# Patient Record
Sex: FEMALE | Race: WHITE | NOT HISPANIC OR LATINO | Employment: OTHER | URBAN - METROPOLITAN AREA
[De-identification: names, ages, dates, MRNs, and addresses within clinical notes are randomized per-mention and may not be internally consistent; named-entity substitution may affect disease eponyms.]

---

## 2021-06-01 ENCOUNTER — TRANSCRIBE ORDERS (OUTPATIENT)
Dept: ADMINISTRATIVE | Facility: HOSPITAL | Age: 71
End: 2021-06-01

## 2021-06-01 DIAGNOSIS — Z12.31 ENCOUNTER FOR SCREENING MAMMOGRAM FOR BREAST CANCER: Primary | ICD-10-CM

## 2021-07-15 ENCOUNTER — APPOINTMENT (OUTPATIENT)
Dept: MAMMOGRAPHY | Facility: HOSPITAL | Age: 71
End: 2021-07-15

## 2021-10-08 ENCOUNTER — OFFICE VISIT (OUTPATIENT)
Dept: ORTHOPEDIC SURGERY | Facility: CLINIC | Age: 71
End: 2021-10-08

## 2021-10-08 VITALS — BODY MASS INDEX: 45.67 KG/M2 | WEIGHT: 248.2 LBS | HEIGHT: 62 IN | HEART RATE: 78 BPM | OXYGEN SATURATION: 87 %

## 2021-10-08 DIAGNOSIS — M25.562 PAIN IN BOTH KNEES, UNSPECIFIED CHRONICITY: ICD-10-CM

## 2021-10-08 DIAGNOSIS — M17.0 BILATERAL PRIMARY OSTEOARTHRITIS OF KNEE: Primary | ICD-10-CM

## 2021-10-08 DIAGNOSIS — M25.561 PAIN IN BOTH KNEES, UNSPECIFIED CHRONICITY: ICD-10-CM

## 2021-10-08 PROCEDURE — 99203 OFFICE O/P NEW LOW 30 MIN: CPT | Performed by: ORTHOPAEDIC SURGERY

## 2021-10-08 PROCEDURE — 20610 DRAIN/INJ JOINT/BURSA W/O US: CPT | Performed by: ORTHOPAEDIC SURGERY

## 2021-10-08 RX ORDER — FUROSEMIDE 40 MG/1
40 TABLET ORAL 2 TIMES DAILY
Status: ON HOLD | COMMUNITY
End: 2022-01-18 | Stop reason: SDUPTHER

## 2021-10-08 RX ORDER — ALBUTEROL SULFATE 90 UG/1
2 AEROSOL, METERED RESPIRATORY (INHALATION) EVERY 4 HOURS PRN
COMMUNITY

## 2021-10-08 RX ORDER — ESCITALOPRAM OXALATE 20 MG/1
20 TABLET ORAL DAILY
COMMUNITY

## 2021-10-08 RX ORDER — OMEPRAZOLE 20 MG/1
20 CAPSULE, DELAYED RELEASE ORAL DAILY
COMMUNITY

## 2021-10-08 RX ORDER — GABAPENTIN 800 MG/1
800 TABLET ORAL 3 TIMES DAILY
COMMUNITY

## 2021-10-08 RX ORDER — ATENOLOL 25 MG/1
25 TABLET ORAL DAILY
COMMUNITY

## 2021-10-08 RX ORDER — ATORVASTATIN CALCIUM 40 MG/1
40 TABLET, FILM COATED ORAL DAILY
COMMUNITY

## 2021-10-08 RX ORDER — HYDROCODONE BITARTRATE AND ACETAMINOPHEN 10; 325 MG/1; MG/1
1 TABLET ORAL EVERY 6 HOURS PRN
COMMUNITY

## 2021-10-08 RX ADMIN — LIDOCAINE HYDROCHLORIDE 9 ML: 10 INJECTION, SOLUTION INFILTRATION; PERINEURAL at 14:57

## 2021-10-08 RX ADMIN — METHYLPREDNISOLONE ACETATE 80 MG: 80 INJECTION, SUSPENSION INTRA-ARTICULAR; INTRALESIONAL; INTRAMUSCULAR; SOFT TISSUE at 14:57
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
## 2021-10-11 RX ORDER — LIDOCAINE HYDROCHLORIDE 10 MG/ML
9 INJECTION, SOLUTION INFILTRATION; PERINEURAL
Status: COMPLETED | OUTPATIENT
Start: 2021-10-08 | End: 2021-10-08

## 2021-10-11 RX ORDER — METHYLPREDNISOLONE ACETATE 80 MG/ML
80 INJECTION, SUSPENSION INTRA-ARTICULAR; INTRALESIONAL; INTRAMUSCULAR; SOFT TISSUE
Status: COMPLETED | OUTPATIENT
Start: 2021-10-08 | End: 2021-10-08

## 2022-01-10 ENCOUNTER — APPOINTMENT (OUTPATIENT)
Dept: GENERAL RADIOLOGY | Facility: HOSPITAL | Age: 72
End: 2022-01-10

## 2022-01-10 ENCOUNTER — HOSPITAL ENCOUNTER (INPATIENT)
Facility: HOSPITAL | Age: 72
LOS: 8 days | Discharge: NURSING, REHAB OR LONG-TERM CARE | End: 2022-01-18
Attending: EMERGENCY MEDICINE | Admitting: FAMILY MEDICINE

## 2022-01-10 DIAGNOSIS — R26.2 DIFFICULTY WALKING: ICD-10-CM

## 2022-01-10 DIAGNOSIS — J18.9 PNEUMONIA OF LEFT LOWER LOBE DUE TO INFECTIOUS ORGANISM: ICD-10-CM

## 2022-01-10 DIAGNOSIS — J96.01 ACUTE RESPIRATORY FAILURE WITH HYPOXIA: Primary | ICD-10-CM

## 2022-01-10 DIAGNOSIS — Z78.9 DECREASED ACTIVITIES OF DAILY LIVING (ADL): ICD-10-CM

## 2022-01-10 DIAGNOSIS — J44.1 ACUTE EXACERBATION OF CHRONIC OBSTRUCTIVE PULMONARY DISEASE (COPD): ICD-10-CM

## 2022-01-10 LAB
ALBUMIN SERPL-MCNC: 3.9 G/DL (ref 3.5–5.2)
ALBUMIN/GLOB SERPL: 1.1 G/DL
ALP SERPL-CCNC: 129 U/L (ref 39–117)
ALT SERPL W P-5'-P-CCNC: 11 U/L (ref 1–33)
ANION GAP SERPL CALCULATED.3IONS-SCNC: 13.1 MMOL/L (ref 5–15)
ARTERIAL PATENCY WRIST A: POSITIVE
AST SERPL-CCNC: 16 U/L (ref 1–32)
BASE EXCESS BLDA CALC-SCNC: -0.1 MMOL/L (ref -2–2)
BASOPHILS # BLD AUTO: 0.03 10*3/MM3 (ref 0–0.2)
BASOPHILS NFR BLD AUTO: 0.3 % (ref 0–1.5)
BDY SITE: ABNORMAL
BILIRUB SERPL-MCNC: 0.4 MG/DL (ref 0–1.2)
BUN SERPL-MCNC: 24 MG/DL (ref 8–23)
BUN/CREAT SERPL: 28.9 (ref 7–25)
CALCIUM SPEC-SCNC: 8.5 MG/DL (ref 8.6–10.5)
CHLORIDE SERPL-SCNC: 103 MMOL/L (ref 98–107)
CO2 SERPL-SCNC: 20.9 MMOL/L (ref 22–29)
COHGB MFR BLD: 1.2 % (ref 0–1.5)
CREAT SERPL-MCNC: 0.83 MG/DL (ref 0.57–1)
D-LACTATE SERPL-SCNC: 1.5 MMOL/L (ref 0.5–2)
DEPRECATED RDW RBC AUTO: 46 FL (ref 37–54)
EOSINOPHIL # BLD AUTO: 0.05 10*3/MM3 (ref 0–0.4)
EOSINOPHIL NFR BLD AUTO: 0.4 % (ref 0.3–6.2)
ERYTHROCYTE [DISTWIDTH] IN BLOOD BY AUTOMATED COUNT: 14.3 % (ref 12.3–15.4)
FHHB: 8 % (ref 0–5)
FLUAV AG NPH QL: NEGATIVE
FLUBV AG NPH QL IA: NEGATIVE
GAS FLOW AIRWAY: 0 LPM
GFR SERPL CREATININE-BSD FRML MDRD: 68 ML/MIN/1.73
GLOBULIN UR ELPH-MCNC: 3.6 GM/DL
GLUCOSE SERPL-MCNC: 145 MG/DL (ref 65–99)
HCO3 BLDA-SCNC: 24.6 MMOL/L (ref 22–26)
HCT VFR BLD AUTO: 42.9 % (ref 34–46.6)
HGB BLD-MCNC: 13.7 G/DL (ref 12–15.9)
HGB BLDA-MCNC: 14.1 G/DL (ref 11.7–14.6)
HOLD SPECIMEN: NORMAL
HOLD SPECIMEN: NORMAL
IMM GRANULOCYTES # BLD AUTO: 0.04 10*3/MM3 (ref 0–0.05)
IMM GRANULOCYTES NFR BLD AUTO: 0.4 % (ref 0–0.5)
INHALED O2 CONCENTRATION: 21 %
LACTATE BLDA-SCNC: ABNORMAL MMOL/L
LYMPHOCYTES # BLD AUTO: 2.72 10*3/MM3 (ref 0.7–3.1)
LYMPHOCYTES NFR BLD AUTO: 23.9 % (ref 19.6–45.3)
MCH RBC QN AUTO: 28.3 PG (ref 26.6–33)
MCHC RBC AUTO-ENTMCNC: 31.9 G/DL (ref 31.5–35.7)
MCV RBC AUTO: 88.6 FL (ref 79–97)
METHGB BLD QL: 0.1 % (ref 0–1.5)
MODALITY: ABNORMAL
MONOCYTES # BLD AUTO: 0.72 10*3/MM3 (ref 0.1–0.9)
MONOCYTES NFR BLD AUTO: 6.3 % (ref 5–12)
NEUTROPHILS NFR BLD AUTO: 68.7 % (ref 42.7–76)
NEUTROPHILS NFR BLD AUTO: 7.8 10*3/MM3 (ref 1.7–7)
NRBC BLD AUTO-RTO: 0 /100 WBC (ref 0–0.2)
NT-PROBNP SERPL-MCNC: 145.1 PG/ML (ref 0–900)
OXYHGB MFR BLDV: 90.7 % (ref 94–99)
PCO2 BLDA: 40.4 MM HG (ref 35–45)
PH BLDA: 7.4 PH UNITS (ref 7.35–7.45)
PLATELET # BLD AUTO: 331 10*3/MM3 (ref 140–450)
PMV BLD AUTO: 9.1 FL (ref 6–12)
PO2 BLD: 300 MM[HG] (ref 0–500)
PO2 BLDA: 63 MM HG (ref 80–100)
POTASSIUM SERPL-SCNC: 3.9 MMOL/L (ref 3.5–5.2)
PROCALCITONIN SERPL-MCNC: 0.04 NG/ML (ref 0–0.25)
PROT SERPL-MCNC: 7.5 G/DL (ref 6–8.5)
QT INTERVAL: 362 MS
RBC # BLD AUTO: 4.84 10*6/MM3 (ref 3.77–5.28)
SAO2 % BLDCOA: 91.9 % (ref 95–99)
SARS-COV-2 RNA PNL SPEC NAA+PROBE: NOT DETECTED
SODIUM SERPL-SCNC: 137 MMOL/L (ref 136–145)
TROPONIN T SERPL-MCNC: <0.01 NG/ML (ref 0–0.03)
WBC NRBC COR # BLD: 11.36 10*3/MM3 (ref 3.4–10.8)
WHOLE BLOOD HOLD SPECIMEN: NORMAL
WHOLE BLOOD HOLD SPECIMEN: NORMAL

## 2022-01-10 PROCEDURE — 94799 UNLISTED PULMONARY SVC/PX: CPT

## 2022-01-10 PROCEDURE — 83605 ASSAY OF LACTIC ACID: CPT | Performed by: EMERGENCY MEDICINE

## 2022-01-10 PROCEDURE — 82805 BLOOD GASES W/O2 SATURATION: CPT | Performed by: EMERGENCY MEDICINE

## 2022-01-10 PROCEDURE — 71045 X-RAY EXAM CHEST 1 VIEW: CPT

## 2022-01-10 PROCEDURE — 93010 ELECTROCARDIOGRAM REPORT: CPT | Performed by: INTERNAL MEDICINE

## 2022-01-10 PROCEDURE — 36600 WITHDRAWAL OF ARTERIAL BLOOD: CPT | Performed by: EMERGENCY MEDICINE

## 2022-01-10 PROCEDURE — 84484 ASSAY OF TROPONIN QUANT: CPT | Performed by: EMERGENCY MEDICINE

## 2022-01-10 PROCEDURE — 25010000002 CEFTRIAXONE PER 250 MG: Performed by: EMERGENCY MEDICINE

## 2022-01-10 PROCEDURE — 25010000002 ONDANSETRON PER 1 MG: Performed by: FAMILY MEDICINE

## 2022-01-10 PROCEDURE — 83050 HGB METHEMOGLOBIN QUAN: CPT | Performed by: EMERGENCY MEDICINE

## 2022-01-10 PROCEDURE — 25010000002 AZITHROMYCIN PER 500 MG: Performed by: EMERGENCY MEDICINE

## 2022-01-10 PROCEDURE — 94640 AIRWAY INHALATION TREATMENT: CPT

## 2022-01-10 PROCEDURE — 82375 ASSAY CARBOXYHB QUANT: CPT | Performed by: EMERGENCY MEDICINE

## 2022-01-10 PROCEDURE — 87804 INFLUENZA ASSAY W/OPTIC: CPT | Performed by: EMERGENCY MEDICINE

## 2022-01-10 PROCEDURE — 99285 EMERGENCY DEPT VISIT HI MDM: CPT

## 2022-01-10 PROCEDURE — 87070 CULTURE OTHR SPECIMN AEROBIC: CPT | Performed by: FAMILY MEDICINE

## 2022-01-10 PROCEDURE — 83880 ASSAY OF NATRIURETIC PEPTIDE: CPT | Performed by: EMERGENCY MEDICINE

## 2022-01-10 PROCEDURE — 87205 SMEAR GRAM STAIN: CPT | Performed by: FAMILY MEDICINE

## 2022-01-10 PROCEDURE — 80053 COMPREHEN METABOLIC PANEL: CPT | Performed by: EMERGENCY MEDICINE

## 2022-01-10 PROCEDURE — 25010000002 ENOXAPARIN PER 10 MG: Performed by: FAMILY MEDICINE

## 2022-01-10 PROCEDURE — U0004 COV-19 TEST NON-CDC HGH THRU: HCPCS | Performed by: EMERGENCY MEDICINE

## 2022-01-10 PROCEDURE — 25010000002 METHYLPREDNISOLONE PER 40 MG: Performed by: FAMILY MEDICINE

## 2022-01-10 PROCEDURE — 93005 ELECTROCARDIOGRAM TRACING: CPT | Performed by: EMERGENCY MEDICINE

## 2022-01-10 PROCEDURE — 99223 1ST HOSP IP/OBS HIGH 75: CPT | Performed by: FAMILY MEDICINE

## 2022-01-10 PROCEDURE — 85025 COMPLETE CBC W/AUTO DIFF WBC: CPT | Performed by: EMERGENCY MEDICINE

## 2022-01-10 PROCEDURE — 87040 BLOOD CULTURE FOR BACTERIA: CPT | Performed by: EMERGENCY MEDICINE

## 2022-01-10 PROCEDURE — 84145 PROCALCITONIN (PCT): CPT | Performed by: EMERGENCY MEDICINE

## 2022-01-10 RX ORDER — CHOLECALCIFEROL (VITAMIN D3) 125 MCG
5 CAPSULE ORAL NIGHTLY PRN
Status: DISCONTINUED | OUTPATIENT
Start: 2022-01-10 | End: 2022-01-18 | Stop reason: HOSPADM

## 2022-01-10 RX ORDER — ACETAMINOPHEN 325 MG/1
650 TABLET ORAL ONCE
Status: DISCONTINUED | OUTPATIENT
Start: 2022-01-10 | End: 2022-01-10

## 2022-01-10 RX ORDER — POLYETHYLENE GLYCOL 3350 17 G/17G
17 POWDER, FOR SOLUTION ORAL DAILY PRN
Status: DISCONTINUED | OUTPATIENT
Start: 2022-01-10 | End: 2022-01-18 | Stop reason: HOSPADM

## 2022-01-10 RX ORDER — CEFTRIAXONE SODIUM 1 G/50ML
1 INJECTION, SOLUTION INTRAVENOUS ONCE
Status: DISCONTINUED | OUTPATIENT
Start: 2022-01-10 | End: 2022-01-10

## 2022-01-10 RX ORDER — SODIUM CHLORIDE 0.9 % (FLUSH) 0.9 %
10 SYRINGE (ML) INJECTION AS NEEDED
Status: DISCONTINUED | OUTPATIENT
Start: 2022-01-10 | End: 2022-01-18 | Stop reason: HOSPADM

## 2022-01-10 RX ORDER — SODIUM CHLORIDE 0.9 % (FLUSH) 0.9 %
10 SYRINGE (ML) INJECTION AS NEEDED
Status: DISCONTINUED | OUTPATIENT
Start: 2022-01-10 | End: 2022-01-11

## 2022-01-10 RX ORDER — BUDESONIDE 0.5 MG/2ML
0.5 INHALANT ORAL
Status: DISCONTINUED | OUTPATIENT
Start: 2022-01-10 | End: 2022-01-18 | Stop reason: HOSPADM

## 2022-01-10 RX ORDER — ALUMINA, MAGNESIA, AND SIMETHICONE 2400; 2400; 240 MG/30ML; MG/30ML; MG/30ML
15 SUSPENSION ORAL EVERY 6 HOURS PRN
Status: DISCONTINUED | OUTPATIENT
Start: 2022-01-10 | End: 2022-01-18 | Stop reason: HOSPADM

## 2022-01-10 RX ORDER — AMOXICILLIN 250 MG
2 CAPSULE ORAL 2 TIMES DAILY
Status: DISCONTINUED | OUTPATIENT
Start: 2022-01-10 | End: 2022-01-18 | Stop reason: HOSPADM

## 2022-01-10 RX ORDER — METHYLPREDNISOLONE SODIUM SUCCINATE 40 MG/ML
40 INJECTION, POWDER, LYOPHILIZED, FOR SOLUTION INTRAMUSCULAR; INTRAVENOUS EVERY 8 HOURS
Status: DISCONTINUED | OUTPATIENT
Start: 2022-01-10 | End: 2022-01-12

## 2022-01-10 RX ORDER — ACETAMINOPHEN 325 MG/1
650 TABLET ORAL ONCE
Status: COMPLETED | OUTPATIENT
Start: 2022-01-10 | End: 2022-01-10

## 2022-01-10 RX ORDER — IPRATROPIUM BROMIDE AND ALBUTEROL SULFATE 2.5; .5 MG/3ML; MG/3ML
3 SOLUTION RESPIRATORY (INHALATION) ONCE
Status: COMPLETED | OUTPATIENT
Start: 2022-01-10 | End: 2022-01-10

## 2022-01-10 RX ORDER — GUAIFENESIN AND CODEINE PHOSPHATE 100; 10 MG/5ML; MG/5ML
5 SOLUTION ORAL EVERY 4 HOURS PRN
Status: DISCONTINUED | OUTPATIENT
Start: 2022-01-10 | End: 2022-01-11

## 2022-01-10 RX ORDER — BISACODYL 10 MG
10 SUPPOSITORY, RECTAL RECTAL DAILY PRN
Status: DISCONTINUED | OUTPATIENT
Start: 2022-01-10 | End: 2022-01-18 | Stop reason: HOSPADM

## 2022-01-10 RX ORDER — SODIUM CHLORIDE 0.9 % (FLUSH) 0.9 %
10 SYRINGE (ML) INJECTION EVERY 12 HOURS SCHEDULED
Status: DISCONTINUED | OUTPATIENT
Start: 2022-01-10 | End: 2022-01-18 | Stop reason: HOSPADM

## 2022-01-10 RX ORDER — ONDANSETRON 2 MG/ML
4 INJECTION INTRAMUSCULAR; INTRAVENOUS EVERY 6 HOURS PRN
Status: DISCONTINUED | OUTPATIENT
Start: 2022-01-10 | End: 2022-01-18 | Stop reason: HOSPADM

## 2022-01-10 RX ORDER — CEFTRIAXONE SODIUM 1 G/50ML
1 INJECTION, SOLUTION INTRAVENOUS EVERY 24 HOURS
Status: COMPLETED | OUTPATIENT
Start: 2022-01-11 | End: 2022-01-16

## 2022-01-10 RX ORDER — OXYCODONE HYDROCHLORIDE 5 MG/1
5 TABLET ORAL EVERY 6 HOURS PRN
Status: DISCONTINUED | OUTPATIENT
Start: 2022-01-10 | End: 2022-01-11

## 2022-01-10 RX ORDER — BISACODYL 5 MG/1
5 TABLET, DELAYED RELEASE ORAL DAILY PRN
Status: DISCONTINUED | OUTPATIENT
Start: 2022-01-10 | End: 2022-01-18 | Stop reason: HOSPADM

## 2022-01-10 RX ORDER — ARFORMOTEROL TARTRATE 15 UG/2ML
15 SOLUTION RESPIRATORY (INHALATION)
Status: DISCONTINUED | OUTPATIENT
Start: 2022-01-10 | End: 2022-01-18 | Stop reason: HOSPADM

## 2022-01-10 RX ORDER — CEFTRIAXONE SODIUM 1 G/50ML
1 INJECTION, SOLUTION INTRAVENOUS ONCE
Status: COMPLETED | OUTPATIENT
Start: 2022-01-10 | End: 2022-01-10

## 2022-01-10 RX ORDER — IPRATROPIUM BROMIDE AND ALBUTEROL SULFATE 2.5; .5 MG/3ML; MG/3ML
3 SOLUTION RESPIRATORY (INHALATION)
Status: DISCONTINUED | OUTPATIENT
Start: 2022-01-10 | End: 2022-01-17

## 2022-01-10 RX ORDER — ACETAMINOPHEN 325 MG/1
650 TABLET ORAL EVERY 4 HOURS PRN
Status: DISCONTINUED | OUTPATIENT
Start: 2022-01-10 | End: 2022-01-18 | Stop reason: HOSPADM

## 2022-01-10 RX ADMIN — IPRATROPIUM BROMIDE AND ALBUTEROL SULFATE 3 ML: .5; 3 SOLUTION RESPIRATORY (INHALATION) at 12:02

## 2022-01-10 RX ADMIN — IPRATROPIUM BROMIDE AND ALBUTEROL SULFATE 3 ML: .5; 3 SOLUTION RESPIRATORY (INHALATION) at 12:01

## 2022-01-10 RX ADMIN — ACETAMINOPHEN 650 MG: 325 TABLET ORAL at 20:49

## 2022-01-10 RX ADMIN — SODIUM CHLORIDE, PRESERVATIVE FREE 10 ML: 5 INJECTION INTRAVENOUS at 20:49

## 2022-01-10 RX ADMIN — ARFORMOTEROL TARTRATE 15 MCG: 15 SOLUTION RESPIRATORY (INHALATION) at 21:24

## 2022-01-10 RX ADMIN — METHYLPREDNISOLONE SODIUM SUCCINATE 40 MG: 40 INJECTION, POWDER, FOR SOLUTION INTRAMUSCULAR; INTRAVENOUS at 21:33

## 2022-01-10 RX ADMIN — ONDANSETRON 4 MG: 2 INJECTION INTRAMUSCULAR; INTRAVENOUS at 21:33

## 2022-01-10 RX ADMIN — CEFTRIAXONE SODIUM 1 G: 1 INJECTION, SOLUTION INTRAVENOUS at 12:30

## 2022-01-10 RX ADMIN — Medication 5 MG: at 20:49

## 2022-01-10 RX ADMIN — IPRATROPIUM BROMIDE AND ALBUTEROL SULFATE 3 ML: .5; 2.5 SOLUTION RESPIRATORY (INHALATION) at 21:24

## 2022-01-10 RX ADMIN — ENOXAPARIN SODIUM 40 MG: 40 INJECTION SUBCUTANEOUS at 20:49

## 2022-01-10 RX ADMIN — BUDESONIDE 0.5 MG: 0.5 SUSPENSION RESPIRATORY (INHALATION) at 21:24

## 2022-01-10 RX ADMIN — ACETAMINOPHEN 650 MG: 325 TABLET ORAL at 14:44

## 2022-01-10 NOTE — H&P
[facility]   HOSPITALIST HISTORY AND PHYSICAL  Date: 1/10/2022   Patient Name: Ashtyn Jean Baptiste  : 1950  MRN: [id number]  Primary Care Physician:  Virgilio Morales MD  Date of admission: 1/10/2022    Subjective   Subjective     Chief complaint: Shortness of breath    History of presenting illness:  71-year-old female with history of COPD, GERD without esophagitis, CHF not otherwise specified presented to the emergency room on 1/10/2022 via EMS with chief complaint of shortness of breath.  She notes acute onset shortness of breath symptoms since the morning of 1/10/2022, she woke up with difficulty taking in deep inspiration, over the course of the morning, shortness of breath symptoms have worsened, exertional dyspnea progressed into dyspnea at rest and conversational dyspnea.  She denied any recent fevers, chills, sweats, headache, palpitations, nausea, vomiting, cough, abdominal pain.  She notes that she feels weakness in her lower extremities and has chest pain all over.  She denies any sick contacts or recent travel.  In the emergency room she was found to be acutely hypoxemic requiring 4 L, improved to 2 L while in the ER; nasal cannula oxygen to maintain sats greater than 90%, she does not wear home oxygen.  She was found to be tachypneic and in respiratory distress.  Chest x-ray was concerning for pneumonia left lower lung zone.  White blood cell count elevated 11,000, she was pancultured and placed on antibiotics, she was given several nebs in the emergency room, given the findings of pneumonia and acute hypoxemia with signs of respiratory failure with increased work of breathing, tachypnea, hospitalist service requested to admit for further management. ABG PO2 was 63 on Room air. She does note after further probing for the past 4 days she has not been feeling well, head congestion, body aches, generalized fatigue.    Personal History     Past Medical History:  Past Medical History:   Diagnosis  Date   • CHF (congestive heart failure) (HCC)    • COPD (chronic obstructive pulmonary disease) (HCC)    • GERD (gastroesophageal reflux disease)          Past Surgical History:  Past Surgical History:   Procedure Laterality Date   • APPENDECTOMY     • HYSTERECTOMY           Family History:   Breast Cancer-related family history is not on file.  Denied family history of heart disease, diabetes, or cancers  No sick contacts at home    Social History:   Social History     Socioeconomic History   • Marital status:    Tobacco Use   • Smoking status: Former Smoker   • Smokeless tobacco: Never Used   Substance and Sexual Activity   • Alcohol use: Never   • Drug use: Never   • Sexual activity: Defer     Home Medications:  HYDROcodone-acetaminophen, albuterol sulfate HFA, atenolol, atorvastatin, escitalopram, furosemide, gabapentin, and omeprazole    Allergies:  Allergies   Allergen Reactions   • Latex Hives   • Codeine Nausea And Vomiting   • Morphine Nausea And Vomiting       Review of Systems   All systems were reviewed and negative except for: Nasal congestion, body aches, fatigue, increased work of breathing, shortness of breath.    Objective   Objective     Vitals:   Temp:  [97.9 °F (36.6 °C)] 97.9 °F (36.6 °C)  Heart Rate:  [83-89] 83  Resp:  [15-20] 20  BP: (159)/(72) 159/72  Flow (L/min):  [2] 2    Physical Exam    Constitutional: Awake, alert, no acute distress morbidly obese in ER room 14 hospital stretcher laying semiflat, on 2 L nasal cannula, conversational dyspnea   Eyes: Pupils equal, sclerae anicteric, no conjunctival injection   HENT: NCAT, mucous membranes moist, sinus congestion   Neck: Supple, no thyromegaly, no lymphadenopathy, trachea midline   Respiratory: Air entry bilaterally, coarse breath sounds throughout, coarse wheezing inspiratory and expiratory   Cardiovascular: RRR, no murmurs, rubs, or gallops, palpable pedal pulses bilaterally   Gastrointestinal: Positive bowel sounds, soft,  nontender, nondistended   Musculoskeletal: No bilateral ankle edema, no clubbing or cyanosis to extremities   Psychiatric: Appropriate affect, cooperative   Neurologic: Oriented x 3, strength symmetric in all extremities, Cranial Nerves grossly intact to confrontation, speech clear   Skin: No rashes     Result Review    Result Review:  I have personally reviewed the results from the time of this admission to 1/10/2022 15:17 EST and agree with these findings:  [x]  Laboratory   CBC    CBC 5/26/21 1/10/22   WBC 9.26 11.36 (A)   RBC 4.36 4.84   Hemoglobin 11.2 (A) 13.7   Hematocrit 37.7 42.9   MCV 86.5 88.6   MCH 25.7 (A) 28.3   MCHC 29.7 (A) 31.9   RDW 15.5 14.3   Platelets 327 331   (A) Abnormal value            BMP    BMP 11/23/21 12/3/21 1/10/22   Glucose 110 (A) 108 (A)    BUN 32 (A) 24 (A) 24 (A)   Creatinine 0.9 1.1 0.83   Sodium 142 139 137   Potassium 5.2 (A) 4.6 3.9   Chloride 104 99 103   CO2 27 27 20.9 (A)   Calcium 9.6 9.3 8.5 (A)   (A) Abnormal value       Comments are available for some flowsheets but are not being displayed.           LIVER FUNCTION TESTS:      Lab 01/10/22  1059   TOTAL PROTEIN 7.5   ALBUMIN 3.90   GLOBULIN 3.6   ALT (SGPT) 11   AST (SGOT) 16   BILIRUBIN 0.4   ALK PHOS 129*       [x]  Microbiology  [x]  Radiology XR Chest 1 View    Result Date: 1/10/2022  PROCEDURE: XR CHEST 1 VW  COMPARISON: None  INDICATIONS: SOB, CHEST PAINS  FINDINGS:  Heart size is upper limits of normal.  Interstitial prominence in the mid to lower lung zones.  There is an area of more dense opacity in the lateral left lower lung zone.       Findings suspicious for pneumonia, particularly in the left lower lung zone.  Correlate with presentation.       MICHAEL LERNER MD       Electronically Signed and Approved By: MICHAEL LERNER MD on 1/10/2022 at 11:20             [x]  EKG/Telemetry   []  Cardiology/Vascular   []  Pathology  [x]  Old records  []  Other:      Assessment/Plan   Assessment / Plan     Assessment/Plan:    Assessment:  Left lower lobe pneumonia  Acute hypoxemic respiratory failure secondary to pneumonia  Concern for SARS-CoV-2 infection  COPD with acute exacerbation  Increased work of breathing  Morbid obesity  Leukocytosis  History of CHF unspecified diastolic or systolic  GERD without esophagitis    Plan:  Labs and imaging reviewed  Admit to hospitalist service  Isolate for possibility of SARS-CoV-2 infection  Check procalcitonin  Start ceftriaxone 1 g daily for 7 days  Start azithromycin 500 milligrams daily for 3 days  Follow-up strep antigen, Legionella antigen, mycoplasma IgM, sputum culture  If she does test positive for SARS-CoV-2 infection, will start Decadron and remdesivir  Telemetry monitoring  Start Brovana, Pulmicort nebs twice daily  Start DuoNebs every 6 hours  Bronchopulmonary hygiene protocol  Bronchodilator protocol  Supplemental oxygen to maintain O2 saturation greater 92%  Start Solu-Medrol 40 mg IV every 8 hours  A.m. labs  Full code  VT prophylaxis Lovenox  Clinical course to dictate further management  Discussed with nurse at the bedside, ER Dr. Grider who requested admission      DVT prophylaxis:  No DVT prophylaxis order currently exists.    CODE STATUS:    Level Of Support Discussed With: Patient  Code Status (Patient has no pulse and is not breathing): CPR (Attempt to Resuscitate)  Medical Interventions (Patient has pulse or is breathing): Full Support      Admission Status:  I believe this patient meets inpatient status.    Electronically signed by Tripp Arzate MD, 01/10/22, 3:17 PM EST.

## 2022-01-10 NOTE — ED NOTES
Patient sating 87% on room air post duoneb treatment. Patient was placed on 2L NC sats at 89-90%. Patient was placed on 3L NC and is sitting at 92%     Mariano Dean RN  01/10/22 9504

## 2022-01-10 NOTE — ED PROVIDER NOTES
Time: 14:50 EST  Arrived by: POV  Chief Complaint: SOB  History provided by: pt  History is limited by: N/A    History of Present Illness:    Ashtyn Jean Baptiste is a 71 y.o. female with a hx of COPD who presents to the emergency department today with complaints of shortness of breath since this morning. Pt claims she woke up having difficulty getting air in and notes it has been persistent since onset. She denies being on home O2. Pt goes on to complain of pleuritic chest pain as well as bilateral leg weakness. She denies cough, nausea, emesis, fever, headache, chills, diaphoresis, diarrhea, headache, or abdominal pain.       History provided by:  Patient   used: No        Past Medical History:     Allergies   Allergen Reactions   • Latex Hives   • Codeine Nausea And Vomiting   • Morphine Nausea And Vomiting     Past Medical History:   Diagnosis Date   • CHF (congestive heart failure) ([facility])    • COPD (chronic obstructive pulmonary disease) ([facility])    • GERD (gastroesophageal reflux disease)      Past Surgical History:   Procedure Laterality Date   • APPENDECTOMY     • HYSTERECTOMY       History reviewed. No pertinent family history.    Home Medications:  Prior to Admission medications    Medication Sig Start Date End Date Taking? Authorizing Provider   albuterol sulfate  (90 Base) MCG/ACT inhaler Inhale 2 puffs Every 4 (Four) Hours As Needed for Wheezing.    ProviderCarito MD   atenolol (TENORMIN) 25 MG tablet Take 25 mg by mouth Daily.    Carito Dubon MD   atorvastatin (LIPITOR) 40 MG tablet Take 40 mg by mouth Daily.    Carito Dubon MD   escitalopram (LEXAPRO) 20 MG tablet Take 20 mg by mouth Daily.    Carito Dubon MD   furosemide (LASIX) 40 MG tablet Take 40 mg by mouth 2 (Two) Times a Day.    Carito Dubon MD   gabapentin (NEURONTIN) 800 MG tablet Take 800 mg by mouth 3 (Three) Times a Day.    Carito Dubon MD   HYDROcodone-acetaminophen  "(NORCO)  MG per tablet Take 1 tablet by mouth Every 6 (Six) Hours As Needed for Moderate Pain .    Provider, Historical, MD   omeprazole (priLOSEC) 20 MG capsule Take 20 mg by mouth Daily.    Provider, Historical, MD        Social History:   PT  reports that she has quit smoking. She has never used smokeless tobacco. She reports that she does not drink alcohol and does not use drugs.    Record Review:  I have reviewed the patient's records in [facility].     Review of Systems  Review of Systems   Constitutional: Negative for chills and fever.   HENT: Negative for congestion, rhinorrhea and sore throat.    Eyes: Negative for pain and visual disturbance.   Respiratory: Positive for shortness of breath. Negative for apnea, cough and chest tightness.    Cardiovascular: Negative for chest pain and palpitations.   Gastrointestinal: Negative for abdominal pain, diarrhea, nausea and vomiting.   Genitourinary: Negative for difficulty urinating and dysuria.   Musculoskeletal: Negative for joint swelling and myalgias.   Skin: Negative for color change.   Neurological: Positive for weakness. Negative for seizures, numbness and headaches.   Psychiatric/Behavioral: Negative.    All other systems reviewed and are negative.       Physical Exam  /72 (BP Location: Right arm, Patient Position: Sitting)   Pulse 83   Temp 97.9 °F (36.6 °C) (Oral)   Resp 20   Ht 157.5 cm (62\")   Wt 112 kg (246 lb 7.6 oz)   SpO2 94%   BMI 45.08 kg/m²     Physical Exam  Vitals and nursing note reviewed.   Constitutional:       General: She is not in acute distress.     Appearance: Normal appearance. She is not toxic-appearing.   HENT:      Head: Normocephalic and atraumatic.      Jaw: There is normal jaw occlusion.   Eyes:      General: Lids are normal.      Extraocular Movements: Extraocular movements intact.      Conjunctiva/sclera: Conjunctivae normal.      Pupils: Pupils are equal, round, and reactive to light.   Cardiovascular:      Rate " "and Rhythm: Normal rate and regular rhythm.      Pulses: Normal pulses.      Heart sounds: Normal heart sounds.   Pulmonary:      Effort: Tachypnea and respiratory distress (mild) present.      Breath sounds: Normal breath sounds. No wheezing or rhonchi.   Abdominal:      General: Abdomen is flat.      Palpations: Abdomen is soft.      Tenderness: There is no abdominal tenderness. There is no guarding or rebound.   Musculoskeletal:         General: Normal range of motion.      Cervical back: Normal range of motion and neck supple.      Right lower leg: No edema.      Left lower leg: No edema.   Skin:     General: Skin is warm and dry.   Neurological:      Mental Status: She is alert and oriented to person, place, and time. Mental status is at baseline.   Psychiatric:         Mood and Affect: Mood normal.                  ED Course  /72 (BP Location: Right arm, Patient Position: Sitting)   Pulse 83   Temp 97.9 °F (36.6 °C) (Oral)   Resp 20   Ht 157.5 cm (62\")   Wt 112 kg (246 lb 7.6 oz)   SpO2 94%   BMI 45.08 kg/m²   Results for orders placed or performed during the hospital encounter of 01/10/22   Influenza Antigen, Rapid - Swab, Nasopharynx    Specimen: Nasopharynx; Swab   Result Value Ref Range    Influenza A Ag, EIA Negative Negative    Influenza B Ag, EIA Negative Negative   Comprehensive Metabolic Panel    Specimen: Blood   Result Value Ref Range    Glucose 145 (H) 65 - 99 mg/dL    BUN 24 (H) 8 - 23 mg/dL    Creatinine 0.83 0.57 - 1.00 mg/dL    Sodium 137 136 - 145 mmol/L    Potassium 3.9 3.5 - 5.2 mmol/L    Chloride 103 98 - 107 mmol/L    CO2 20.9 (L) 22.0 - 29.0 mmol/L    Calcium 8.5 (L) 8.6 - 10.5 mg/dL    Total Protein 7.5 6.0 - 8.5 g/dL    Albumin 3.90 3.50 - 5.20 g/dL    ALT (SGPT) 11 1 - 33 U/L    AST (SGOT) 16 1 - 32 U/L    Alkaline Phosphatase 129 (H) 39 - 117 U/L    Total Bilirubin 0.4 0.0 - 1.2 mg/dL    eGFR Non African Amer 68 >60 mL/min/1.73    Globulin 3.6 gm/dL    A/G Ratio 1.1 g/dL "    BUN/Creatinine Ratio 28.9 (H) 7.0 - 25.0    Anion Gap 13.1 5.0 - 15.0 mmol/L   BNP    Specimen: Blood   Result Value Ref Range    proBNP 145.1 0.0 - 900.0 pg/mL   Troponin    Specimen: Blood   Result Value Ref Range    Troponin T <0.010 0.000 - 0.030 ng/mL   CBC Auto Differential    Specimen: Blood   Result Value Ref Range    WBC 11.36 (H) 3.40 - 10.80 10*3/mm3    RBC 4.84 3.77 - 5.28 10*6/mm3    Hemoglobin 13.7 12.0 - 15.9 g/dL    Hematocrit 42.9 34.0 - 46.6 %    MCV 88.6 79.0 - 97.0 fL    MCH 28.3 26.6 - 33.0 pg    MCHC 31.9 31.5 - 35.7 g/dL    RDW 14.3 12.3 - 15.4 %    RDW-SD 46.0 37.0 - 54.0 fl    MPV 9.1 6.0 - 12.0 fL    Platelets 331 140 - 450 10*3/mm3    Neutrophil % 68.7 42.7 - 76.0 %    Lymphocyte % 23.9 19.6 - 45.3 %    Monocyte % 6.3 5.0 - 12.0 %    Eosinophil % 0.4 0.3 - 6.2 %    Basophil % 0.3 0.0 - 1.5 %    Immature Grans % 0.4 0.0 - 0.5 %    Neutrophils, Absolute 7.80 (H) 1.70 - 7.00 10*3/mm3    Lymphocytes, Absolute 2.72 0.70 - 3.10 10*3/mm3    Monocytes, Absolute 0.72 0.10 - 0.90 10*3/mm3    Eosinophils, Absolute 0.05 0.00 - 0.40 10*3/mm3    Basophils, Absolute 0.03 0.00 - 0.20 10*3/mm3    Immature Grans, Absolute 0.04 0.00 - 0.05 10*3/mm3    nRBC 0.0 0.0 - 0.2 /100 WBC   Blood Gas, Arterial -With Co-Ox Panel: Yes    Specimen: Arterial Blood   Result Value Ref Range    pH, Arterial 7.403 7.350 - 7.450 pH units    pCO2, Arterial 40.4 35.0 - 45.0 mm Hg    pO2, Arterial 63.0 (L) 80.0 - 100.0 mm Hg    HCO3, Arterial 24.6 22.0 - 26.0 mmol/L    Base Excess, Arterial -0.1 -2.0 - 2.0 mmol/L    O2 Saturation, Arterial 91.9 (L) 95.0 - 99.0 %    Hemoglobin, Blood Gas 14.1 11.7 - 14.6 g/dL    Carboxyhemoglobin 1.2 0 - 1.5 %    Methemoglobin 0.10 0.00 - 1.50 %    Oxyhemoglobin 90.7 (L) 94 - 99 %    FHHB 8.0 (H) 0.0 - 5.0 %    Site Arterial: left radial     Modality RA     FIO2 21 %    Flow Rate 0 lpm    PO2/FIO2 300 0 - 500    Eliecer's Test Positive     Lactate, Arterial     Lactic Acid, Plasma    Specimen:  Blood   Result Value Ref Range    Lactate 1.5 0.5 - 2.0 mmol/L   ECG 12 Lead   Result Value Ref Range    QT Interval 362 ms   Green Top (Gel)   Result Value Ref Range    Extra Tube Hold for add-ons.    Lavender Top   Result Value Ref Range    Extra Tube hold for add-on    Gold Top - SST   Result Value Ref Range    Extra Tube Hold for add-ons.    Light Blue Top   Result Value Ref Range    Extra Tube hold for add-on      Medications   sodium chloride 0.9 % flush 10 mL (has no administration in time range)   ipratropium-albuterol (DUO-NEB) nebulizer solution 3 mL (3 mL Nebulization Given 1/10/22 1202)   ipratropium-albuterol (DUO-NEB) nebulizer solution 3 mL (3 mL Nebulization Given 1/10/22 1201)   cefTRIAXone (ROCEPHIN) IVPB 1 g (0 g Intravenous Stopped 1/10/22 1413)   AZITHROMYCIN 500 MG/250 ML 0.9% NS IVPB (vial-mate) (0 mg Intravenous Stopped 1/10/22 1445)   acetaminophen (TYLENOL) tablet 650 mg (650 mg Oral Given 1/10/22 1444)     XR Chest 1 View    Result Date: 1/10/2022  Narrative: PROCEDURE: XR CHEST 1 VW  COMPARISON: None  INDICATIONS: SOB, CHEST PAINS  FINDINGS:  Heart size is upper limits of normal.  Interstitial prominence in the mid to lower lung zones.  There is an area of more dense opacity in the lateral left lower lung zone.      Impression:  Findings suspicious for pneumonia, particularly in the left lower lung zone.  Correlate with presentation.       MICHAEL LERNER MD       Electronically Signed and Approved By: MICHAEL LERNER MD on 1/10/2022 at 11:20               Procedures/EKGs:  Procedures    Medical Decision Making:                     The patient was seen and evaluated the ED by me.  The above history and physical examination was performed as documented.  Patient was found to be hypoxic.  Patient was placed on supplemental O2.  Patient's work-up was also positive for pneumonia along with her known history of COPD.  Patient's been tested for COVID-19 but this is pending.  Patient was started on  community-acquired antibiotics.  Patient required hospitalization due to her hypoxia.    MDM     Final diagnoses:   Acute respiratory failure with hypoxia (HCC)   Pneumonia of left lower lobe due to infectious organism   Acute exacerbation of chronic obstructive pulmonary disease (COPD) (HCC)          Disposition:  ED Disposition     ED Disposition Condition Comment    Decision to Admit            Documentation assistance provided by Estuardo Grider DO acting as scribe for Estuardo Grider DO. Information recorded by the scribe was done at my direction and has been verified and validated by me.        Tawanna Colon  01/10/22 1120       Estuardo Grider DO  01/10/22 4081

## 2022-01-11 LAB
ALBUMIN SERPL-MCNC: 4.2 G/DL (ref 3.5–5.2)
ALP SERPL-CCNC: 139 U/L (ref 39–117)
ALT SERPL W P-5'-P-CCNC: 12 U/L (ref 1–33)
ANION GAP SERPL CALCULATED.3IONS-SCNC: 12 MMOL/L (ref 5–15)
AST SERPL-CCNC: 13 U/L (ref 1–32)
BASOPHILS # BLD AUTO: 0.01 10*3/MM3 (ref 0–0.2)
BASOPHILS NFR BLD AUTO: 0.1 % (ref 0–1.5)
BILIRUB CONJ SERPL-MCNC: <0.2 MG/DL (ref 0–0.3)
BILIRUB INDIRECT SERPL-MCNC: ABNORMAL MG/DL
BILIRUB SERPL-MCNC: 0.3 MG/DL (ref 0–1.2)
BUN SERPL-MCNC: 32 MG/DL (ref 8–23)
BUN/CREAT SERPL: 28.3 (ref 7–25)
CALCIUM SPEC-SCNC: 9.6 MG/DL (ref 8.6–10.5)
CHLORIDE SERPL-SCNC: 104 MMOL/L (ref 98–107)
CK MB SERPL-CCNC: 4.78 NG/ML
CO2 SERPL-SCNC: 24 MMOL/L (ref 22–29)
CREAT SERPL-MCNC: 1.13 MG/DL (ref 0.57–1)
DEPRECATED RDW RBC AUTO: 46 FL (ref 37–54)
EOSINOPHIL # BLD AUTO: 0 10*3/MM3 (ref 0–0.4)
EOSINOPHIL NFR BLD AUTO: 0 % (ref 0.3–6.2)
ERYTHROCYTE [DISTWIDTH] IN BLOOD BY AUTOMATED COUNT: 14.4 % (ref 12.3–15.4)
GFR SERPL CREATININE-BSD FRML MDRD: 48 ML/MIN/1.73
GLUCOSE SERPL-MCNC: 184 MG/DL (ref 65–99)
HCT VFR BLD AUTO: 40 % (ref 34–46.6)
HGB BLD-MCNC: 12.8 G/DL (ref 12–15.9)
IMM GRANULOCYTES # BLD AUTO: 0.04 10*3/MM3 (ref 0–0.05)
IMM GRANULOCYTES NFR BLD AUTO: 0.4 % (ref 0–0.5)
L PNEUMO1 AG UR QL IA: NEGATIVE
LYMPHOCYTES # BLD AUTO: 0.85 10*3/MM3 (ref 0.7–3.1)
LYMPHOCYTES NFR BLD AUTO: 9.2 % (ref 19.6–45.3)
M PNEUMO IGM SER QL: NEGATIVE
MAGNESIUM SERPL-MCNC: 2 MG/DL (ref 1.6–2.4)
MCH RBC QN AUTO: 28.4 PG (ref 26.6–33)
MCHC RBC AUTO-ENTMCNC: 32 G/DL (ref 31.5–35.7)
MCV RBC AUTO: 88.7 FL (ref 79–97)
MONOCYTES # BLD AUTO: 0.22 10*3/MM3 (ref 0.1–0.9)
MONOCYTES NFR BLD AUTO: 2.4 % (ref 5–12)
NEUTROPHILS NFR BLD AUTO: 8.13 10*3/MM3 (ref 1.7–7)
NEUTROPHILS NFR BLD AUTO: 87.9 % (ref 42.7–76)
NRBC BLD AUTO-RTO: 0 /100 WBC (ref 0–0.2)
PHOSPHATE SERPL-MCNC: 3.3 MG/DL (ref 2.5–4.5)
PLATELET # BLD AUTO: 304 10*3/MM3 (ref 140–450)
PMV BLD AUTO: 9.3 FL (ref 6–12)
POTASSIUM SERPL-SCNC: 4 MMOL/L (ref 3.5–5.2)
PROT SERPL-MCNC: 7.6 G/DL (ref 6–8.5)
QT INTERVAL: 443 MS
RBC # BLD AUTO: 4.51 10*6/MM3 (ref 3.77–5.28)
S PNEUM AG SPEC QL LA: NEGATIVE
SODIUM SERPL-SCNC: 140 MMOL/L (ref 136–145)
TROPONIN T SERPL-MCNC: <0.01 NG/ML (ref 0–0.03)
WBC NRBC COR # BLD: 9.25 10*3/MM3 (ref 3.4–10.8)

## 2022-01-11 PROCEDURE — 85025 COMPLETE CBC W/AUTO DIFF WBC: CPT | Performed by: FAMILY MEDICINE

## 2022-01-11 PROCEDURE — 25010000002 AZITHROMYCIN PER 500 MG: Performed by: FAMILY MEDICINE

## 2022-01-11 PROCEDURE — 94760 N-INVAS EAR/PLS OXIMETRY 1: CPT

## 2022-01-11 PROCEDURE — 63710000001 PROMETHAZINE PER 12.5 MG: Performed by: HOSPITALIST

## 2022-01-11 PROCEDURE — 80048 BASIC METABOLIC PNL TOTAL CA: CPT | Performed by: FAMILY MEDICINE

## 2022-01-11 PROCEDURE — 25010000002 PROCHLORPERAZINE 10 MG/2ML SOLUTION: Performed by: HOSPITALIST

## 2022-01-11 PROCEDURE — 86713 LEGIONELLA ANTIBODY: CPT | Performed by: FAMILY MEDICINE

## 2022-01-11 PROCEDURE — 86603 ADENOVIRUS ANTIBODY: CPT | Performed by: FAMILY MEDICINE

## 2022-01-11 PROCEDURE — 94799 UNLISTED PULMONARY SVC/PX: CPT

## 2022-01-11 PROCEDURE — 25010000002 CEFTRIAXONE PER 250 MG: Performed by: FAMILY MEDICINE

## 2022-01-11 PROCEDURE — 93010 ELECTROCARDIOGRAM REPORT: CPT | Performed by: INTERNAL MEDICINE

## 2022-01-11 PROCEDURE — 93005 ELECTROCARDIOGRAM TRACING: CPT | Performed by: FAMILY MEDICINE

## 2022-01-11 PROCEDURE — 82553 CREATINE MB FRACTION: CPT | Performed by: FAMILY MEDICINE

## 2022-01-11 PROCEDURE — 87899 AGENT NOS ASSAY W/OPTIC: CPT | Performed by: FAMILY MEDICINE

## 2022-01-11 PROCEDURE — 83735 ASSAY OF MAGNESIUM: CPT | Performed by: FAMILY MEDICINE

## 2022-01-11 PROCEDURE — 84100 ASSAY OF PHOSPHORUS: CPT | Performed by: FAMILY MEDICINE

## 2022-01-11 PROCEDURE — 84484 ASSAY OF TROPONIN QUANT: CPT | Performed by: FAMILY MEDICINE

## 2022-01-11 PROCEDURE — 25010000002 ENOXAPARIN PER 10 MG: Performed by: FAMILY MEDICINE

## 2022-01-11 PROCEDURE — 86631 CHLAMYDIA ANTIBODY: CPT | Performed by: FAMILY MEDICINE

## 2022-01-11 PROCEDURE — 86738 MYCOPLASMA ANTIBODY: CPT | Performed by: FAMILY MEDICINE

## 2022-01-11 PROCEDURE — 80076 HEPATIC FUNCTION PANEL: CPT | Performed by: FAMILY MEDICINE

## 2022-01-11 PROCEDURE — 25010000002 ONDANSETRON PER 1 MG: Performed by: FAMILY MEDICINE

## 2022-01-11 PROCEDURE — 99232 SBSQ HOSP IP/OBS MODERATE 35: CPT | Performed by: FAMILY MEDICINE

## 2022-01-11 PROCEDURE — 25010000002 METHYLPREDNISOLONE PER 40 MG: Performed by: FAMILY MEDICINE

## 2022-01-11 RX ORDER — HYDROCODONE BITARTRATE AND ACETAMINOPHEN 10; 325 MG/1; MG/1
1 TABLET ORAL EVERY 6 HOURS PRN
Status: DISCONTINUED | OUTPATIENT
Start: 2022-01-11 | End: 2022-01-18 | Stop reason: HOSPADM

## 2022-01-11 RX ORDER — ATENOLOL 25 MG/1
25 TABLET ORAL DAILY
Status: DISCONTINUED | OUTPATIENT
Start: 2022-01-11 | End: 2022-01-18 | Stop reason: HOSPADM

## 2022-01-11 RX ORDER — GUAIFENESIN 200 MG/10ML
200 LIQUID ORAL EVERY 4 HOURS PRN
Status: DISCONTINUED | OUTPATIENT
Start: 2022-01-11 | End: 2022-01-18 | Stop reason: HOSPADM

## 2022-01-11 RX ORDER — GABAPENTIN 400 MG/1
800 CAPSULE ORAL EVERY 8 HOURS SCHEDULED
Status: DISCONTINUED | OUTPATIENT
Start: 2022-01-11 | End: 2022-01-18 | Stop reason: HOSPADM

## 2022-01-11 RX ORDER — PROMETHAZINE HYDROCHLORIDE 12.5 MG/1
12.5 SUPPOSITORY RECTAL EVERY 6 HOURS PRN
Status: DISCONTINUED | OUTPATIENT
Start: 2022-01-11 | End: 2022-01-18 | Stop reason: HOSPADM

## 2022-01-11 RX ORDER — FUROSEMIDE 40 MG/1
40 TABLET ORAL DAILY
Status: DISCONTINUED | OUTPATIENT
Start: 2022-01-11 | End: 2022-01-18 | Stop reason: HOSPADM

## 2022-01-11 RX ORDER — ATORVASTATIN CALCIUM 40 MG/1
40 TABLET, FILM COATED ORAL NIGHTLY
Status: DISCONTINUED | OUTPATIENT
Start: 2022-01-11 | End: 2022-01-18 | Stop reason: HOSPADM

## 2022-01-11 RX ORDER — PROMETHAZINE HYDROCHLORIDE 12.5 MG/1
12.5 TABLET ORAL EVERY 6 HOURS PRN
Status: DISCONTINUED | OUTPATIENT
Start: 2022-01-11 | End: 2022-01-18 | Stop reason: HOSPADM

## 2022-01-11 RX ORDER — ESCITALOPRAM OXALATE 10 MG/1
20 TABLET ORAL DAILY
Status: DISCONTINUED | OUTPATIENT
Start: 2022-01-11 | End: 2022-01-18 | Stop reason: HOSPADM

## 2022-01-11 RX ORDER — HYDROCODONE BITARTRATE AND ACETAMINOPHEN 5; 325 MG/1; MG/1
1 TABLET ORAL EVERY 6 HOURS PRN
Status: DISCONTINUED | OUTPATIENT
Start: 2022-01-11 | End: 2022-01-18 | Stop reason: HOSPADM

## 2022-01-11 RX ORDER — PROCHLORPERAZINE EDISYLATE 5 MG/ML
5 INJECTION INTRAMUSCULAR; INTRAVENOUS EVERY 6 HOURS PRN
Status: DISCONTINUED | OUTPATIENT
Start: 2022-01-11 | End: 2022-01-18 | Stop reason: HOSPADM

## 2022-01-11 RX ADMIN — PROCHLORPERAZINE EDISYLATE 5 MG: 5 INJECTION INTRAMUSCULAR; INTRAVENOUS at 08:41

## 2022-01-11 RX ADMIN — METHYLPREDNISOLONE SODIUM SUCCINATE 40 MG: 40 INJECTION, POWDER, FOR SOLUTION INTRAMUSCULAR; INTRAVENOUS at 05:42

## 2022-01-11 RX ADMIN — METHYLPREDNISOLONE SODIUM SUCCINATE 40 MG: 40 INJECTION, POWDER, FOR SOLUTION INTRAMUSCULAR; INTRAVENOUS at 21:00

## 2022-01-11 RX ADMIN — ATORVASTATIN CALCIUM 40 MG: 40 TABLET, FILM COATED ORAL at 21:00

## 2022-01-11 RX ADMIN — ARFORMOTEROL TARTRATE 15 MCG: 15 SOLUTION RESPIRATORY (INHALATION) at 18:41

## 2022-01-11 RX ADMIN — IPRATROPIUM BROMIDE AND ALBUTEROL SULFATE 3 ML: .5; 2.5 SOLUTION RESPIRATORY (INHALATION) at 18:41

## 2022-01-11 RX ADMIN — SODIUM CHLORIDE, PRESERVATIVE FREE 10 ML: 5 INJECTION INTRAVENOUS at 08:52

## 2022-01-11 RX ADMIN — BUDESONIDE 0.5 MG: 0.5 SUSPENSION RESPIRATORY (INHALATION) at 18:41

## 2022-01-11 RX ADMIN — SODIUM CHLORIDE, PRESERVATIVE FREE 10 ML: 5 INJECTION INTRAVENOUS at 21:00

## 2022-01-11 RX ADMIN — HYDROCODONE BITARTRATE AND ACETAMINOPHEN 1 TABLET: 5; 325 TABLET ORAL at 21:00

## 2022-01-11 RX ADMIN — OXYCODONE HYDROCHLORIDE 5 MG: 5 TABLET ORAL at 06:47

## 2022-01-11 RX ADMIN — ATENOLOL 25 MG: 25 TABLET ORAL at 10:04

## 2022-01-11 RX ADMIN — ARFORMOTEROL TARTRATE 15 MCG: 15 SOLUTION RESPIRATORY (INHALATION) at 09:22

## 2022-01-11 RX ADMIN — BUDESONIDE 0.5 MG: 0.5 SUSPENSION RESPIRATORY (INHALATION) at 09:23

## 2022-01-11 RX ADMIN — GUAIFENESIN AND CODEINE PHOSPHATE 5 ML: 10; 100 LIQUID ORAL at 00:03

## 2022-01-11 RX ADMIN — GABAPENTIN 800 MG: 400 CAPSULE ORAL at 12:04

## 2022-01-11 RX ADMIN — FUROSEMIDE 40 MG: 40 TABLET ORAL at 10:04

## 2022-01-11 RX ADMIN — METHYLPREDNISOLONE SODIUM SUCCINATE 40 MG: 40 INJECTION, POWDER, FOR SOLUTION INTRAMUSCULAR; INTRAVENOUS at 14:13

## 2022-01-11 RX ADMIN — ENOXAPARIN SODIUM 40 MG: 40 INJECTION SUBCUTANEOUS at 21:00

## 2022-01-11 RX ADMIN — AZITHROMYCIN MONOHYDRATE 500 MG: 500 INJECTION, POWDER, LYOPHILIZED, FOR SOLUTION INTRAVENOUS at 08:51

## 2022-01-11 RX ADMIN — ENOXAPARIN SODIUM 40 MG: 40 INJECTION SUBCUTANEOUS at 08:52

## 2022-01-11 RX ADMIN — GABAPENTIN 800 MG: 400 CAPSULE ORAL at 21:00

## 2022-01-11 RX ADMIN — ONDANSETRON 4 MG: 2 INJECTION INTRAMUSCULAR; INTRAVENOUS at 03:27

## 2022-01-11 RX ADMIN — HYDROCODONE BITARTRATE AND ACETAMINOPHEN 1 TABLET: 5; 325 TABLET ORAL at 10:04

## 2022-01-11 RX ADMIN — CEFTRIAXONE SODIUM 1 G: 1 INJECTION, SOLUTION INTRAVENOUS at 08:51

## 2022-01-11 RX ADMIN — IPRATROPIUM BROMIDE AND ALBUTEROL SULFATE 3 ML: .5; 2.5 SOLUTION RESPIRATORY (INHALATION) at 13:06

## 2022-01-11 RX ADMIN — OXYCODONE HYDROCHLORIDE 5 MG: 5 TABLET ORAL at 00:03

## 2022-01-11 RX ADMIN — ESCITALOPRAM OXALATE 20 MG: 10 TABLET ORAL at 10:04

## 2022-01-11 RX ADMIN — PROMETHAZINE HYDROCHLORIDE 12.5 MG: 12.5 TABLET ORAL at 04:47

## 2022-01-11 RX ADMIN — IPRATROPIUM BROMIDE AND ALBUTEROL SULFATE 3 ML: .5; 2.5 SOLUTION RESPIRATORY (INHALATION) at 09:23

## 2022-01-11 NOTE — PROGRESS NOTES
[facility]   Hospitalist Progress Note  Date: 2022  Patient Name: Ashtyn Jean Baptiste  : 1950  MRN: [id number]  Date of admission: 1/10/2022      Subjective   Subjective     Chief complaint: Shortness of breath    Summary:  71-year-old female with history of COPD, GERD without esophagitis, CHF not otherwise specified was hospitalized on 1/10/2022 with shortness of breath symptoms, left lower lobe pneumonia, acute hypoxemic respiratory failure secondary to left lower lobe pneumonia, ruled out for COVID-19, COPD with acute exacerbation, increased work of breathing and conversational dyspnea, started on supplemental oxygen, empiric antibiotics, preliminary sputum culture showing gram-positive bacilli and cocci.    Interval follow-up: Patient seen and examined this morning, no acute distress, no acute major overnight events, patient was still requiring 2 to 3 L nasal cannula oxygen to maintain sats greater than 92%, her daughter is at the bedside, she denied any fevers, chills, sweats.  Denies chest pain or palpitations.  I reviewed her telemetry monitor for the past 24 hours, periods of tachycardia, PVCs, baseline sinus rhythm in the 70s.  She continues to have exertional dyspnea and cough.  Prelim sputum culture growing gram-positive bacilli and cocci on gram stain.    Review of systems:  All systems reviewed and negative except for cough, shortness of breath, generalized fatigue, hypoxemia, weakness    Objective   Objective     Vitals:   Temp:  [97.3 °F (36.3 °C)-98.1 °F (36.7 °C)] 97.3 °F (36.3 °C)  Heart Rate:  [] 88  Resp:  [18] 18  BP: (122-163)/(63-77) 122/65  Flow (L/min):  [3] 3  Physical Exam      Constitutional: Awake, alert, no acute distress morbidly obese, on 3 L nasal cannula              Eyes: Pupils equal, sclerae anicteric, no conjunctival injection              HENT: NCAT, mucous membranes moist, sinus congestion              Neck: Supple, full range of motion               Respiratory: Air entry bilaterally, coarse breath sounds throughout, coarse wheezing inspiratory and expiratory              Cardiovascular: RRR, no murmurs, rubs, or gallops, palpable pedal pulses bilaterally              Gastrointestinal: Positive bowel sounds, soft, nontender, nondistended              Musculoskeletal: No bilateral ankle edema, no clubbing or cyanosis to extremities              Psychiatric: Appropriate affect, cooperative              Neurologic: Oriented x 3, strength symmetric in all extremities, Cranial Nerves grossly intact to confrontation, speech clear              Skin: No rashes     Result Review    Result Review:  I have personally reviewed the results from the time of this admission to 1/11/2022 18:17 EST and agree with these findings:  [x]  Laboratory   CBC    CBC 5/26/21 1/10/22 1/11/22   WBC 9.26 11.36 (A) 9.25   RBC 4.36 4.84 4.51   Hemoglobin 11.2 (A) 13.7 12.8   Hematocrit 37.7 42.9 40.0   MCV 86.5 88.6 88.7   MCH 25.7 (A) 28.3 28.4   MCHC 29.7 (A) 31.9 32.0   RDW 15.5 14.3 14.4   Platelets 327 331 304   (A) Abnormal value            BMP    BMP 12/3/21 1/10/22 1/11/22   Glucose 108 (A)     BUN 24 (A) 24 (A) 32 (A)   Creatinine 1.1 0.83 1.13 (A)   Sodium 139 137 140   Potassium 4.6 3.9 4.0   Chloride 99 103 104   CO2 27 20.9 (A) 24.0   Calcium 9.3 8.5 (A) 9.6   (A) Abnormal value       Comments are available for some flowsheets but are not being displayed.           LIVER FUNCTION TESTS:      Lab 01/11/22  0504 01/10/22  1059   TOTAL PROTEIN 7.6 7.5   ALBUMIN 4.20 3.90   GLOBULIN  --  3.6   ALT (SGPT) 12 11   AST (SGOT) 13 16   BILIRUBIN 0.3 0.4   BILIRUBIN DIRECT <0.2  --    ALK PHOS 139* 129*       [x]  Microbiology   Blood Culture   Date Value Ref Range Status   01/10/2022 No growth at 24 hours  Preliminary   01/10/2022 No growth at 24 hours  Preliminary     No results found for: BCIDPCR, CXREFLEX, CSFCX, CULTURETIS  No results found for: CULTURES, HSVCX, URCX  No results found  for: EYECULTURE, GCCX, HSVCULTURE, LABHSV  No results found for: LEGIONELLA, MRSACX, MUMPSCX, MYCOPLASCX  No results found for: NOCARDIACX, STOOLCX  No results found for: THROATCX, UNSTIMCULT, URINECX, CULTURE, VZVCULTUR  No results found for: VIRALCULTU, WOUNDCX    [x]  Radiology XR Chest 1 View    Result Date: 1/10/2022  PROCEDURE: XR CHEST 1 VW  COMPARISON: None  INDICATIONS: SOB, CHEST PAINS  FINDINGS:  Heart size is upper limits of normal.  Interstitial prominence in the mid to lower lung zones.  There is an area of more dense opacity in the lateral left lower lung zone.       Findings suspicious for pneumonia, particularly in the left lower lung zone.  Correlate with presentation.       MICHAEL LERNER MD       Electronically Signed and Approved By: MICHAEL LERNER MD on 1/10/2022 at 11:20               [x]  EKG/Telemetry   []  Cardiology/Vascular   []  Pathology  [x]  Old records  []  Other:    Assessment/Plan   Assessment / Plan     Assessment/Plan:  Assessment:  Left lower lobe pneumonia likely community-acquired pneumonia staph versus strep versus atypical  Acute hypoxemic respiratory failure secondary to pneumonia  COPD with acute exacerbation  Increased work of breathing  Morbid obesity  Leukocytosis  History of CHF unspecified diastolic or systolic  GERD without esophagitis     Plan:  Labs and imaging reviewed  Follow-up sputum culture  Continue with IV ceftriaxone 1 g daily for 7 days  Continue with IV azithromycin 500 milligrams daily for 3 days total  Out of bed to chair  Wean oxygen per tolerance  If she continues to require oxygen over the next 24 hours will need CT scan to further evaluate for other etiologies   Telemetry monitoring continued  Start PT/OT  Continue with Ross James nebs twice daily  S continue with tareduin Turner every 6 hours  Bronchopulmonary hygiene protocol  Bronchodilator protocol  Supplemental oxygen to maintain O2 saturation greater 92%  Continue with Solu-Medrol 40 mg IV  every 8 hours  A.m. labs  Full code  VT prophylaxis Lovenox  Clinical course to dictate further management  Discussed with nurse at the bedside   discussed with patient's daughter at the bedside    DVT prophylaxis:  Medical DVT prophylaxis orders are present.    CODE STATUS:   Level Of Support Discussed With: Patient  Code Status (Patient has no pulse and is not breathing): CPR (Attempt to Resuscitate)  Medical Interventions (Patient has pulse or is breathing): Full Support        Electronically signed by Tripp Arzate MD, 01/11/22, 6:17 PM EST.

## 2022-01-11 NOTE — PAYOR COMM NOTE
"Ashtyn Jean Baptiste (71 y.o. Female)             Date of Birth Social Security Number Address [place] Phone MRN    1950  [address] [facility] [id number] [phone] [id number]    Ep[employer]op[employer] Marital Status             None        Admission Date Admission Type Admitting Provider Attending Provider Department, Room/Bed    1/10/22 Emergency  Estuardo Grider DO [facility] EMERGENCY ROOM, 14/14    Discharge Date Discharge Disposition Discharge Destination                         Attending Provider: Estuardo Grider DO    Allergies: Latex, Codeine, Morphine    Isolation: None   Infection: COVID (rule out) (01/10/22)   Code Status: CPR   Advance Care Planning Activity    Ht: 157.5 cm (62\")   Wt: 112 kg (246 lb 7.6 oz)    Admission Cmt: None   Principal Problem: None                Active Insurance as of 1/10/2022     Primary Coverage     Payor Plan Insurance Group Employer/Plan Group    ANTHEM MEDICARE REPLACEMENT ANTHEM MEDICARE ADVANTAGE [id number]     Payor Plan Address Payor Plan Phone Number Payor Plan Fax Number Effective Dates    [address] [phone]  1/1/2019 - None Entered    [facility] [id number]       Subscriber Name Subscriber Birth Date Member ID       ASHTYN JEAN BAPTISTE 1950 [id number]                 Emergency Contacts          No emergency contacts on file.                Pulmonary Disease GRG - Clinical Indications for Admission to Inpatient Care by Jessica Bell, RN         Met: Reviewed on 1/10/2022 by Jessica Bell RN       Created Using Review Status Review Entered   Cmxtwenty Completed 1/10/2022 19:07       Criteria Set Name - Subset   Pulmonary Disease GRG - Clinical Indications for Admission to Inpatient Care      Criteria Review      Clinical Indications for Admission to Inpatient Care    Most Recent : Jessica Bell Most Recent Date: 1/10/2022 19:07:12 EST    (X) Hospital admission is needed for appropriate care of the patient because of  1 or more  of    the " "following  (1) (2) (3):       ( ) Severe respiratory findings (not responsive to observation care as appropriate), including        1 or more  of the following  (6) (9) (10):          ( ) Respiratory distress, as indicated by  ALL  of the following  (6) (11):             ( ) Evidence of respiratory compromise, as indicated by  1 or more  of the following :                ( ) Hypoxemia                1/10/2022 19:07:12 EST by Jessica Bell                  Patient sating 87% on room air post duoneb treatment. Patient was placed on 2L NC sats at 89-90%. Patient was placed on 3L NC and is sitting at 92% per Mariano Dean RN- ED notes. Hx: COPD  pO2= 63.0, pCO2=40.4, pH= 7.403; HCO3= 24.6 Pt very SOB.                ( ) Other evidence of respiratory compromise (eg, pulmonary edema on chest x-ray)                1/10/2022 19:07:12 EST by Jessica Bell                  CXR impression:Findings suspicious for pneumonia, particularly in the left lower lung zone.  Interstitial prominence in the mid to lower lung zones. busKJR=614.1       (X) High-risk pulmonary infection, as indicated by  1 or more  of the following  (26) (27) (28)       (29):          (X) Other high-risk comorbidity (eg, poorly controlled diabetes, cirrhosis, chronic renal insufficiency)          1/10/2022 19:07:12 EST by Jessica Bell            Morbid Obesity- BMI=45.08; Ht-157.5cm (62\"); Wt-112kg (246 lb 7.6oz) Hx: CHF, COPD, GERD, former smoker. C/O pleuritic chest pain & difficulty getting air in. CXR- suspicious for PNA, particularly left lower zone. Covid test pending.          (X) Hypoxemia          1/10/2022 19:07:12 EST by Jessica Bell            pO2= 63.0  O2 sats=87% RA post duo neb tx. Placed on O2 @ 2L =89-90%.  3L=92%       [place]: NPI [id number] Tax ID [id number]  Problem List           Codes Noted - Resolved       Hospital    Acute respiratory failure with hypoxia (HCC) ICD-10-CM: J96.01  ICD-9-CM: 518.81 1/10/2022 - Present           "   History & Physical      Tripp Arzate MD at 01/10/22 1517           [facility]  [facility]: 1/10/2022   Patient Name: Ashtyn Jean Baptiste  : 1950  MRN: [id number]  Primary Care Physician:  Virgilio Morales MD  Date of admission: 1/10/2022    Subjective   Subjective     Chief complaint: Shortness of breath    History of presenting illness:  71-year-old female with history of COPD, GERD without esophagitis, CHF not otherwise specified presented to the emergency room on 1/10/2022 via EMS with chief complaint of shortness of breath.  She notes acute onset shortness of breath symptoms since the morning of 1/10/2022, she woke up with difficulty taking in deep inspiration, over the course of the morning, shortness of breath symptoms have worsened, exertional dyspnea progressed into dyspnea at rest and conversational dyspnea.  She denied any recent fevers, chills, sweats, headache, palpitations, nausea, vomiting, cough, abdominal pain.  She notes that she feels weakness in her lower extremities and has chest pain all over.  She denies any sick contacts or recent travel.  In the emergency room she was found to be acutely hypoxemic requiring 4 L, improved to 2 L while in the ER; nasal cannula oxygen to maintain sats greater than 90%, she does not wear home oxygen.  She was found to be tachypneic and in respiratory distress.  Chest x-ray was concerning for pneumonia left lower lung zone.  White blood cell count elevated 11,000, she was pancultured and placed on antibiotics, she was given several nebs in the emergency room, given the findings of pneumonia and acute hypoxemia with signs of respiratory failure with increased work of breathing, tachypnea, hospitalist service requested to admit for further management. ABG PO2 was 63 on Room air. She does note after further probing for the past 4 days she has not been feeling well, head congestion, body aches, generalized  fatigue.    Personal History     Past Medical History:  Past Medical History:   Diagnosis Date   • CHF (congestive heart failure) (HCC)    • COPD (chronic obstructive pulmonary disease) (HCC)    • GERD (gastroesophageal reflux disease)          Past Surgical History:  Past Surgical History:   Procedure Laterality Date   • APPENDECTOMY     • HYSTERECTOMY           Family History:   Breast Cancer-related family history is not on file.  Denied family history of heart disease, diabetes, or cancers  No sick contacts at home    Social History:   Social History     Socioeconomic History   • Marital status:    Tobacco Use   • Smoking status: Former Smoker   • Smokeless tobacco: Never Used   Substance and Sexual Activity   • Alcohol use: Never   • Drug use: Never   • Sexual activity: Defer     Home Medications:  HYDROcodone-acetaminophen, albuterol sulfate HFA, atenolol, atorvastatin, escitalopram, furosemide, gabapentin, and omeprazole    Allergies:  Allergies   Allergen Reactions   • Latex Hives   • Codeine Nausea And Vomiting   • Morphine Nausea And Vomiting       Review of Systems   All systems were reviewed and negative except for: Nasal congestion, body aches, fatigue, increased work of breathing, shortness of breath.    Objective   Objective     Vitals:   Temp:  [97.9 °F (36.6 °C)] 97.9 °F (36.6 °C)  Heart Rate:  [83-89] 83  Resp:  [15-20] 20  BP: (159)/(72) 159/72  Flow (L/min):  [2] 2    Physical Exam    Constitutional: Awake, alert, no acute distress morbidly obese in ER room 14 hospital stretcher laying semiflat, on 2 L nasal cannula, conversational dyspnea   Eyes: Pupils equal, sclerae anicteric, no conjunctival injection   HENT: NCAT, mucous membranes moist, sinus congestion   Neck: Supple, no thyromegaly, no lymphadenopathy, trachea midline   Respiratory: Air entry bilaterally, coarse breath sounds throughout, coarse wheezing inspiratory and expiratory   Cardiovascular: RRR, no murmurs, rubs, or gallops,  palpable pedal pulses bilaterally   Gastrointestinal: Positive bowel sounds, soft, nontender, nondistended   Musculoskeletal: No bilateral ankle edema, no clubbing or cyanosis to extremities   Psychiatric: Appropriate affect, cooperative   Neurologic: Oriented x 3, strength symmetric in all extremities, Cranial Nerves grossly intact to confrontation, speech clear   Skin: No rashes     Result Review    Result Review:  I have personally reviewed the results from the time of this admission to 1/10/2022 15:17 EST and agree with these findings:  [x]  Laboratory   CBC    CBC 5/26/21 1/10/22   WBC 9.26 11.36 (A)   RBC 4.36 4.84   Hemoglobin 11.2 (A) 13.7   Hematocrit 37.7 42.9   MCV 86.5 88.6   MCH 25.7 (A) 28.3   MCHC 29.7 (A) 31.9   RDW 15.5 14.3   Platelets 327 331   (A) Abnormal value            BMP    BMP 11/23/21 12/3/21 1/10/22   Glucose 110 (A) 108 (A)    BUN 32 (A) 24 (A) 24 (A)   Creatinine 0.9 1.1 0.83   Sodium 142 139 137   Potassium 5.2 (A) 4.6 3.9   Chloride 104 99 103   CO2 27 27 20.9 (A)   Calcium 9.6 9.3 8.5 (A)   (A) Abnormal value       Comments are available for some flowsheets but are not being displayed.           LIVER FUNCTION TESTS:      Lab 01/10/22  1059   TOTAL PROTEIN 7.5   ALBUMIN 3.90   GLOBULIN 3.6   ALT (SGPT) 11   AST (SGOT) 16   BILIRUBIN 0.4   ALK PHOS 129*       [x]  Microbiology  [x]  Radiology XR Chest 1 View    Result Date: 1/10/2022  PROCEDURE: XR CHEST 1 VW  COMPARISON: None  INDICATIONS: SOB, CHEST PAINS  FINDINGS:  Heart size is upper limits of normal.  Interstitial prominence in the mid to lower lung zones.  There is an area of more dense opacity in the lateral left lower lung zone.       Findings suspicious for pneumonia, particularly in the left lower lung zone.  Correlate with presentation.       MICHAEL LERNER MD       Electronically Signed and Approved By: MICHAEL LERNER MD on 1/10/2022 at 11:20             [x]  EKG/Telemetry   []  Cardiology/Vascular   []  Pathology  [x]  Old  records  []  Other:      Assessment/Plan   Assessment / Plan     Assessment/Plan:   Assessment:  Left lower lobe pneumonia  Acute hypoxemic respiratory failure secondary to pneumonia  Concern for SARS-CoV-2 infection  COPD with acute exacerbation  Increased work of breathing  Morbid obesity  Leukocytosis  History of CHF unspecified diastolic or systolic  GERD without esophagitis    Plan:  Labs and imaging reviewed  Admit to hospitalist service  Isolate for possibility of SARS-CoV-2 infection  Check procalcitonin  Start ceftriaxone 1 g daily for 7 days  Start azithromycin finder milligrams daily for 3 days  Follow-up strep antigen, Legionella antigen, mycoplasma IgM, sputum culture  If she does test positive for SARS-CoV-2 infection, will start Decadron and remdesivir  Telemetry monitoring  Start Brovana, Pulmicort nebs twice daily  Start DuoNebs every 6 hours  Bronchopulmonary hygiene protocol  Bronchodilator protocol  Supplemental oxygen to maintain O2 saturation greater 92%  Start Solu-Medrol 40 mg IV every 8 hours  A.m. labs  Full code  VT prophylaxis Lovenox  Clinical course to dictate further management  Discussed with nurse at the bedside, ER Dr. Grider who requested admission      DVT prophylaxis:  No DVT prophylaxis order currently exists.    CODE STATUS:    Level Of Support Discussed With: Patient  Code Status (Patient has no pulse and is not breathing): CPR (Attempt to Resuscitate)  Medical Interventions (Patient has pulse or is breathing): Full Support      Admission Status:  I believe this patient meets inpatient status.    Electronically signed by Tripp Arzate MD, 01/10/22, 3:17 PM EST.             Electronically signed by Tripp Arzate MD at 01/10/22 9101          Emergency Department Notes      Estuardo Grider DO at 01/10/22 1109          Time: 14:50 EST  Arrived by: POV  Chief Complaint: SOB  History provided by: pt  History is limited by: N/A    History of Present Illness:    Ashtyn Jean Baptiste is a  71 y.o. female with a hx of COPD who presents to the emergency department today with complaints of shortness of breath since this morning. Pt claims she woke up having difficulty getting air in and notes it has been persistent since onset. She denies being on home O2. Pt goes on to complain of pleuritic chest pain as well as bilateral leg weakness. She denies cough, nausea, emesis, fever, headache, chills, diaphoresis, diarrhea, headache, or abdominal pain.       History provided by:  Patient   used: No        Past Medical History:     Allergies   Allergen Reactions   • Latex Hives   • Codeine Nausea And Vomiting   • Morphine Nausea And Vomiting     Past Medical History:   Diagnosis Date   • CHF (congestive heart failure) ([facility])    • COPD (chronic obstructive pulmonary disease) ([facility])    • GERD (gastroesophageal reflux disease)      Past Surgical History:   Procedure Laterality Date   • APPENDECTOMY     • HYSTERECTOMY       History reviewed. No pertinent family history.    Home Medications:  Prior to Admission medications    Medication Sig Start Date End Date Taking? Authorizing Provider   albuterol sulfate  (90 Base) MCG/ACT inhaler Inhale 2 puffs Every 4 (Four) Hours As Needed for Wheezing.    ProviderCarito MD   atenolol (TENORMIN) 25 MG tablet Take 25 mg by mouth Daily.    Carito Dubon MD   atorvastatin (LIPITOR) 40 MG tablet Take 40 mg by mouth Daily.    Carito Dubon MD   escitalopram (LEXAPRO) 20 MG tablet Take 20 mg by mouth Daily.    Carito Dubon MD   furosemide (LASIX) 40 MG tablet Take 40 mg by mouth 2 (Two) Times a Day.    Carito Dubon MD   gabapentin (NEURONTIN) 800 MG tablet Take 800 mg by mouth 3 (Three) Times a Day.    Carito Dubon MD   HYDROcodone-acetaminophen (NORCO)  MG per tablet Take 1 tablet by mouth Every 6 (Six) Hours As Needed for Moderate Pain .    Carito Dubon MD   omeprazole (priLOSEC) 20 MG  "capsule Take 20 mg by mouth Daily.    Provider, Carito, MD        Social History:   PT  reports that she has quit smoking. She has never used smokeless tobacco. She reports that she does not drink alcohol and does not use drugs.    Record Review:  I have reviewed the patient's records in [facility].     Review of Systems  Review of Systems   Constitutional: Negative for chills and fever.   HENT: Negative for congestion, rhinorrhea and sore throat.    Eyes: Negative for pain and visual disturbance.   Respiratory: Positive for shortness of breath. Negative for apnea, cough and chest tightness.    Cardiovascular: Negative for chest pain and palpitations.   Gastrointestinal: Negative for abdominal pain, diarrhea, nausea and vomiting.   Genitourinary: Negative for difficulty urinating and dysuria.   Musculoskeletal: Negative for joint swelling and myalgias.   Skin: Negative for color change.   Neurological: Positive for weakness. Negative for seizures, numbness and headaches.   Psychiatric/Behavioral: Negative.    All other systems reviewed and are negative.       Physical Exam  /72 (BP Location: Right arm, Patient Position: Sitting)   Pulse 83   Temp 97.9 °F (36.6 °C) (Oral)   Resp 20   Ht 157.5 cm (62\")   Wt 112 kg (246 lb 7.6 oz)   SpO2 94%   BMI 45.08 kg/m²     Physical Exam  Vitals and nursing note reviewed.   Constitutional:       General: She is not in acute distress.     Appearance: Normal appearance. She is not toxic-appearing.   HENT:      Head: Normocephalic and atraumatic.      Jaw: There is normal jaw occlusion.   Eyes:      General: Lids are normal.      Extraocular Movements: Extraocular movements intact.      Conjunctiva/sclera: Conjunctivae normal.      Pupils: Pupils are equal, round, and reactive to light.   Cardiovascular:      Rate and Rhythm: Normal rate and regular rhythm.      Pulses: Normal pulses.      Heart sounds: Normal heart sounds.   Pulmonary:      Effort: Tachypnea and " "respiratory distress (mild) present.      Breath sounds: Normal breath sounds. No wheezing or rhonchi.   Abdominal:      General: Abdomen is flat.      Palpations: Abdomen is soft.      Tenderness: There is no abdominal tenderness. There is no guarding or rebound.   Musculoskeletal:         General: Normal range of motion.      Cervical back: Normal range of motion and neck supple.      Right lower leg: No edema.      Left lower leg: No edema.   Skin:     General: Skin is warm and dry.   Neurological:      Mental Status: She is alert and oriented to person, place, and time. Mental status is at baseline.   Psychiatric:         Mood and Affect: Mood normal.                  ED Course  /72 (BP Location: Right arm, Patient Position: Sitting)   Pulse 83   Temp 97.9 °F (36.6 °C) (Oral)   Resp 20   Ht 157.5 cm (62\")   Wt 112 kg (246 lb 7.6 oz)   SpO2 94%   BMI 45.08 kg/m²   Results for orders placed or performed during the hospital encounter of 01/10/22   Influenza Antigen, Rapid - Swab, Nasopharynx    Specimen: Nasopharynx; Swab   Result Value Ref Range    Influenza A Ag, EIA Negative Negative    Influenza B Ag, EIA Negative Negative   Comprehensive Metabolic Panel    Specimen: Blood   Result Value Ref Range    Glucose 145 (H) 65 - 99 mg/dL    BUN 24 (H) 8 - 23 mg/dL    Creatinine 0.83 0.57 - 1.00 mg/dL    Sodium 137 136 - 145 mmol/L    Potassium 3.9 3.5 - 5.2 mmol/L    Chloride 103 98 - 107 mmol/L    CO2 20.9 (L) 22.0 - 29.0 mmol/L    Calcium 8.5 (L) 8.6 - 10.5 mg/dL    Total Protein 7.5 6.0 - 8.5 g/dL    Albumin 3.90 3.50 - 5.20 g/dL    ALT (SGPT) 11 1 - 33 U/L    AST (SGOT) 16 1 - 32 U/L    Alkaline Phosphatase 129 (H) 39 - 117 U/L    Total Bilirubin 0.4 0.0 - 1.2 mg/dL    eGFR Non African Amer 68 >60 mL/min/1.73    Globulin 3.6 gm/dL    A/G Ratio 1.1 g/dL    BUN/Creatinine Ratio 28.9 (H) 7.0 - 25.0    Anion Gap 13.1 5.0 - 15.0 mmol/L   BNP    Specimen: Blood   Result Value Ref Range    proBNP 145.1 0.0 - " 900.0 pg/mL   Troponin    Specimen: Blood   Result Value Ref Range    Troponin T <0.010 0.000 - 0.030 ng/mL   CBC Auto Differential    Specimen: Blood   Result Value Ref Range    WBC 11.36 (H) 3.40 - 10.80 10*3/mm3    RBC 4.84 3.77 - 5.28 10*6/mm3    Hemoglobin 13.7 12.0 - 15.9 g/dL    Hematocrit 42.9 34.0 - 46.6 %    MCV 88.6 79.0 - 97.0 fL    MCH 28.3 26.6 - 33.0 pg    MCHC 31.9 31.5 - 35.7 g/dL    RDW 14.3 12.3 - 15.4 %    RDW-SD 46.0 37.0 - 54.0 fl    MPV 9.1 6.0 - 12.0 fL    Platelets 331 140 - 450 10*3/mm3    Neutrophil % 68.7 42.7 - 76.0 %    Lymphocyte % 23.9 19.6 - 45.3 %    Monocyte % 6.3 5.0 - 12.0 %    Eosinophil % 0.4 0.3 - 6.2 %    Basophil % 0.3 0.0 - 1.5 %    Immature Grans % 0.4 0.0 - 0.5 %    Neutrophils, Absolute 7.80 (H) 1.70 - 7.00 10*3/mm3    Lymphocytes, Absolute 2.72 0.70 - 3.10 10*3/mm3    Monocytes, Absolute 0.72 0.10 - 0.90 10*3/mm3    Eosinophils, Absolute 0.05 0.00 - 0.40 10*3/mm3    Basophils, Absolute 0.03 0.00 - 0.20 10*3/mm3    Immature Grans, Absolute 0.04 0.00 - 0.05 10*3/mm3    nRBC 0.0 0.0 - 0.2 /100 WBC   Blood Gas, Arterial -With Co-Ox Panel: Yes    Specimen: Arterial Blood   Result Value Ref Range    pH, Arterial 7.403 7.350 - 7.450 pH units    pCO2, Arterial 40.4 35.0 - 45.0 mm Hg    pO2, Arterial 63.0 (L) 80.0 - 100.0 mm Hg    HCO3, Arterial 24.6 22.0 - 26.0 mmol/L    Base Excess, Arterial -0.1 -2.0 - 2.0 mmol/L    O2 Saturation, Arterial 91.9 (L) 95.0 - 99.0 %    Hemoglobin, Blood Gas 14.1 11.7 - 14.6 g/dL    Carboxyhemoglobin 1.2 0 - 1.5 %    Methemoglobin 0.10 0.00 - 1.50 %    Oxyhemoglobin 90.7 (L) 94 - 99 %    FHHB 8.0 (H) 0.0 - 5.0 %    Site Arterial: left radial     Modality RA     FIO2 21 %    Flow Rate 0 lpm    PO2/FIO2 300 0 - 500    Eliecer's Test Positive     Lactate, Arterial     Lactic Acid, Plasma    Specimen: Blood   Result Value Ref Range    Lactate 1.5 0.5 - 2.0 mmol/L   ECG 12 Lead   Result Value Ref Range    QT Interval 362 ms   Green Top (Gel)   Result  Value Ref Range    Extra Tube Hold for add-ons.    Lavender Top   Result Value Ref Range    Extra Tube hold for add-on    Gold Top - SST   Result Value Ref Range    Extra Tube Hold for add-ons.    Light Blue Top   Result Value Ref Range    Extra Tube hold for add-on      Medications   sodium chloride 0.9 % flush 10 mL (has no administration in time range)   ipratropium-albuterol (DUO-NEB) nebulizer solution 3 mL (3 mL Nebulization Given 1/10/22 1202)   ipratropium-albuterol (DUO-NEB) nebulizer solution 3 mL (3 mL Nebulization Given 1/10/22 1201)   cefTRIAXone (ROCEPHIN) IVPB 1 g (0 g Intravenous Stopped 1/10/22 1413)   AZITHROMYCIN 500 MG/250 ML 0.9% NS IVPB (vial-mate) (0 mg Intravenous Stopped 1/10/22 1445)   acetaminophen (TYLENOL) tablet 650 mg (650 mg Oral Given 1/10/22 1444)     XR Chest 1 View    Result Date: 1/10/2022  Narrative: PROCEDURE: XR CHEST 1 VW  COMPARISON: None  INDICATIONS: SOB, CHEST PAINS  FINDINGS:  Heart size is upper limits of normal.  Interstitial prominence in the mid to lower lung zones.  There is an area of more dense opacity in the lateral left lower lung zone.      Impression:  Findings suspicious for pneumonia, particularly in the left lower lung zone.  Correlate with presentation.       MICHAEL LERNER MD       Electronically Signed and Approved By: MICHAEL LERNER MD on 1/10/2022 at 11:20               Procedures/EKGs:  Procedures    Medical Decision Making:                     The patient was seen and evaluated the ED by me.  The above history and physical examination was performed as documented.  Patient was found to be hypoxic.  Patient was placed on supplemental O2.  Patient's work-up was also positive for pneumonia along with her known history of COPD.  Patient's been tested for COVID-19 but this is pending.  Patient was started on community-acquired antibiotics.  Patient required hospitalization due to her hypoxia.    MDM     Final diagnoses:   Acute respiratory failure with hypoxia  (HCC)   Pneumonia of left lower lobe due to infectious organism   Acute exacerbation of chronic obstructive pulmonary disease (COPD) (HCC)          Disposition:  ED Disposition     ED Disposition Condition Comment    Decision to Admit            Documentation assistance provided by Estuardo Grider DO acting as scribe for Estuardo Grider DO. Information recorded by the scribe was done at my direction and has been verified and validated by me.        Tawanna Colon  01/10/22 1120       Estuardo Grider DO  01/10/22 1450      Electronically signed by Estuardo Grider DO at 01/10/22 1450     Mariano Dean RN at 01/10/22 1128        Patient had 125mg Solumedrol and a duoneb in the ambulance     Mariano Dean RN  01/10/22 1128      Electronically signed by Mariano Dean RN at 01/10/22 1128     Mariano Dean RN at 01/10/22 1129        Patient sating 87% on room air post duoneb treatment. Patient was placed on 2L NC sats at 89-90%. Patient was placed on 3L NC and is sitting at 92%     Mairano Dean RN  01/10/22 1130      Electronically signed by Mariano Dean RN at 01/10/22 1130       Vital Signs (last day)     Date/Time Temp Temp src Pulse Resp BP Patient Position SpO2    01/10/22 1202 -- -- 83 20 -- Sitting 94    01/10/22 1201 -- -- -- -- -- -- 91    01/10/22 1101 -- -- -- -- 159/72 Sitting --    01/10/22 1100 97.9 (36.6) Oral 89 15 -- Sitting 91          Oxygen Therapy (last day)     Date/Time SpO2 Device (Oxygen Therapy) Flow (L/min) Oxygen Concentration (%) ETCO2 (mmHg)    01/10/22 1202 94 room air; nasal cannula 2 -- --    01/10/22 1201 91 nasal cannula -- -- --    01/10/22 1100 91 nasal cannula 2 -- --          Intake & Output (last day)     None          Current Facility-Administered Medications   Medication Dose Route Frequency Provider Last Rate Last Admin   • sodium chloride 0.9 % flush 10 mL  10 mL Intravenous PRN Estuardo Grider DO         Current Outpatient Medications   Medication Sig Dispense Refill   • albuterol sulfate HFA  108 (90 Base) MCG/ACT inhaler Inhale 2 puffs Every 4 (Four) Hours As Needed for Wheezing.     • atenolol (TENORMIN) 25 MG tablet Take 25 mg by mouth Daily.     • atorvastatin (LIPITOR) 40 MG tablet Take 40 mg by mouth Daily.     • escitalopram (LEXAPRO) 20 MG tablet Take 20 mg by mouth Daily.     • furosemide (LASIX) 40 MG tablet Take 40 mg by mouth 2 (Two) Times a Day.     • gabapentin (NEURONTIN) 800 MG tablet Take 800 mg by mouth 3 (Three) Times a Day.     • HYDROcodone-acetaminophen (NORCO)  MG per tablet Take 1 tablet by mouth Every 6 (Six) Hours As Needed for Moderate Pain .     • omeprazole (priLOSEC) 20 MG capsule Take 20 mg by mouth Daily.         Lab Results (last 24 hours)     Procedure Component Value Units Date/Time    COVID-19,APTIMA PANTHER(JAMES),[facility], NP/OP SWAB IN UTM/VTM/SALINE TRANSPORT MEDIA,24 HR TAT - Swab, Nasopharynx [[id number]]  (Normal) Collected: 01/10/22 1217    Specimen: Swab from Nasopharynx Updated: 01/10/22 1924     COVID19 Not Detected    Narrative:      Fact sheet for providers: https://www.fda.gov/media/444070/download     Fact sheet for patients: https://www.fda.gov/media/026381/download    Test performed by RT PCR.    Procalcitonin [[id number]]  (Normal) Collected: 01/10/22 1059    Specimen: Blood Updated: 01/10/22 1536     Procalcitonin 0.04 ng/mL     Narrative:      As a Marker for Sepsis (Non-Neonates):     1. <0.5 ng/mL represents a low risk of severe sepsis and/or septic shock.  2. >2 ng/mL represents a high risk of severe sepsis and/or septic shock.    As a Marker for Lower Respiratory Tract Infections that require antibiotic therapy:  PCT on Admission     Antibiotic Therapy             6-12 Hrs later  >0.5                          Strongly Recommended            >0.25 - <0.5             Recommended  0.1 - 0.25                  Discouraged                       Remeasure/reassess PCT  <0.1                         Strongly Discouraged         Remeasure/reassess  "PCT      As 28 day mortality risk marker: \"Change in Procalcitonin Result\" (>80% or <=80%) if Day 0 (or Day 1) and Day 4 values are available. Refer to http://[email or web address]-pct-[email or web address]    Change in PCT <=80%   A decrease of PCT levels below or equal to 80% defines a positive change in PCT test result representing a higher risk for 28-day all-cause mortality of patients diagnosed with severe sepsis or septic shock.    Change in PCT >80%   A decrease of PCT levels of more than 80% defines a negative change in PCT result representing a lower risk for 28-day all-cause mortality of patients diagnosed with severe sepsis or septic shock.    This test is Prognostic not Diagnostic, if elevated correlate with clinical findings before administering antibiotic treatment.        Influenza Antigen, Rapid - Swab, Nasopharynx [[id number]]  (Normal) Collected: 01/10/22 1217    Specimen: Swab from Nasopharynx Updated: 01/10/22 1312     Influenza A Ag, EIA Negative     Influenza B Ag, EIA Negative    Lactic Acid, Plasma [[id number]]  (Normal) Collected: 01/10/22 1217    Specimen: Blood Updated: 01/10/22 1249     Lactate 1.5 mmol/L     Blood Culture - Blood, Arm, Left [[id number]] Collected: 01/10/22 1217    Specimen: Blood from Arm, Left Updated: 01/10/22 1222    Blood Culture - Blood, Arm, Left [[id number]] Collected: 01/10/22 1217    Specimen: Blood from Arm, Left Updated: 01/10/22 1222    Blood Gas, Arterial -With Co-Ox Panel: Yes [[id number]]  (Abnormal) Collected: 01/10/22 1151    Specimen: Arterial Blood Updated: 01/10/22 1159     pH, Arterial 7.403 pH units      pCO2, Arterial 40.4 mm Hg      pO2, Arterial 63.0 mm Hg      HCO3, Arterial 24.6 mmol/L      Base Excess, Arterial -0.1 mmol/L      O2 Saturation, Arterial 91.9 %      Hemoglobin, Blood Gas 14.1 g/dL      Carboxyhemoglobin 1.2 %      Methemoglobin 0.10 %      Oxyhemoglobin 90.7 %      FHHB 8.0 %      Site Arterial: left radial     Modality RA     FIO2 21 %      Flow Rate " 0 lpm      PO2/FIO2 300     Eliecer's Test Positive     Lactate, Arterial --    Comprehensive Metabolic Panel [[id number]]  (Abnormal) Collected: 01/10/22 1059    Specimen: Blood Updated: 01/10/22 1131     Glucose 145 mg/dL      BUN 24 mg/dL      Creatinine 0.83 mg/dL      Sodium 137 mmol/L      Potassium 3.9 mmol/L      Comment: Slight hemolysis detected by analyzer. Results may be affected.        Chloride 103 mmol/L      CO2 20.9 mmol/L      Calcium 8.5 mg/dL      Total Protein 7.5 g/dL      Albumin 3.90 g/dL      ALT (SGPT) 11 U/L      AST (SGOT) 16 U/L      Alkaline Phosphatase 129 U/L      Total Bilirubin 0.4 mg/dL      eGFR Non African Amer 68 mL/min/1.73      Globulin 3.6 gm/dL      A/G Ratio 1.1 g/dL      BUN/Creatinine Ratio 28.9     Anion Gap 13.1 mmol/L     Narrative:      GFR Normal >60  Chronic Kidney Disease <60  Kidney Failure <15      Troponin [[id number]]  (Normal) Collected: 01/10/22 1059    Specimen: Blood Updated: 01/10/22 1131     Troponin T <0.010 ng/mL     Narrative:      Troponin T Reference Range:  <= 0.03 ng/mL-   Negative for AMI  >0.03 ng/mL-     Abnormal for myocardial necrosis.  Clinicians would have to utilize clinical acumen, EKG, Troponin and serial changes to determine if it is an Acute Myocardial Infarction or myocardial injury due to an underlying chronic condition.       Results may be falsely decreased if patient taking Biotin.      BNP [[id number]]  (Normal) Collected: 01/10/22 1059    Specimen: Blood Updated: 01/10/22 1129     proBNP 145.1 pg/mL     Narrative:      Among patients with dyspnea, NT-proBNP is highly sensitive for the detection of acute congestive heart failure. In addition NT-proBNP of <300 pg/ml effectively rules out acute congestive heart failure with 99% negative predictive value.    Results may be falsely decreased if patient taking Biotin.      CBC & Differential [[id number]]  (Abnormal) Collected: 01/10/22 1059    Specimen: Blood Updated: 01/10/22 1110     Narrative:      The following orders were created for panel order CBC & Differential.  Procedure                               Abnormality         Status                     ---------                               -----------         ------                     CBC Auto Differential[[id number]]        Abnormal            Final result                 Please view results for these tests on the individual orders.    CBC Auto Differential [[id number]]  (Abnormal) Collected: 01/10/22 1059    Specimen: Blood Updated: 01/10/22 1110     WBC 11.36 10*3/mm3      RBC 4.84 10*6/mm3      Hemoglobin 13.7 g/dL      Hematocrit 42.9 %      MCV 88.6 fL      MCH 28.3 pg      MCHC 31.9 g/dL      RDW 14.3 %      RDW-SD 46.0 fl      MPV 9.1 fL      Platelets 331 10*3/mm3      Neutrophil % 68.7 %      Lymphocyte % 23.9 %      Monocyte % 6.3 %      Eosinophil % 0.4 %      Basophil % 0.3 %      Immature Grans % 0.4 %      Neutrophils, Absolute 7.80 10*3/mm3      Lymphocytes, Absolute 2.72 10*3/mm3      Monocytes, Absolute 0.72 10*3/mm3      Eosinophils, Absolute 0.05 10*3/mm3      Basophils, Absolute 0.03 10*3/mm3      Immature Grans, Absolute 0.04 10*3/mm3      nRBC 0.0 /100 WBC     [place] Draw [[id number]] Collected: 01/10/22 1059    Specimen: Blood Updated: 01/10/22 1107    Narrative:      The following orders were created for panel order [place] Draw.  Procedure                               Abnormality         Status                     ---------                               -----------         ------                     Green Top (Gel)[[id number]]                                  Final result               Lavender Top[[id number]]                                     Final result               Gold Top - SST[[id number]]                                   Final result               Light Blue Top[[id number]]                                   Final result                 Please view results for these tests on the individual orders.    Green Top  "(Gel) [[id number]] Collected: 01/10/22 1059    Specimen: Blood Updated: 01/10/22 1107     Extra Tube Hold for add-ons.     Comment: Auto resulted.       Lavender Top [[id number]] Collected: 01/10/22 1059    Specimen: Blood Updated: 01/10/22 1107     Extra Tube hold for add-on     Comment: Auto resulted       Gold Top - SST [[id number]] Collected: 01/10/22 1059    Specimen: Blood Updated: 01/10/22 1107     Extra Tube Hold for add-ons.     Comment: Auto resulted.       Light Blue Top [[id number]] Collected: 01/10/22 1059    Specimen: Blood Updated: 01/10/22 1107     Extra Tube hold for add-on     Comment: Auto resulted           Orders (last 24 hrs)      Start     Ordered    01/10/22 1513  Inpatient Admission  Once         01/10/22 1517    01/10/22 1513  Code Status and Medical Interventions:  Continuous         01/10/22 1517    01/10/22 1501  Procalcitonin  STAT         01/10/22 1500    01/10/22 1445  acetaminophen (TYLENOL) tablet 650 mg  Once,   Status:  Discontinued         01/10/22 1440    01/10/22 1445  acetaminophen (TYLENOL) tablet 650 mg  Once         01/10/22 1441    01/10/22 1441  Hospitalist (on-call MD unless specified)  Once        Specialty:  Hospitalist  Provider:  (Not yet assigned)    01/10/22 1440    01/10/22 1430  cefTRIAXone (ROCEPHIN) IVPB 1 g  Once,   Status:  Discontinued         01/10/22 1428    01/10/22 1430  AZITHROMYCIN 500 MG/250 ML 0.9% NS IVPB (vial-mate)  Once,   Status:  Discontinued         01/10/22 1428    01/10/22 1200  ipratropium-albuterol (DUO-NEB) nebulizer solution 3 mL  Once         01/10/22 1150    01/10/22 1200  ipratropium-albuterol (DUO-NEB) nebulizer solution 3 mL  Once         01/10/22 1150    01/10/22 1200  cefTRIAXone (ROCEPHIN) IVPB 1 g  Once         01/10/22 1150    01/10/22 1200  AZITHROMYCIN 500 MG/250 ML 0.9% NS IVPB (vial-mate)  Once         01/10/22 1150    01/10/22 1150  Blood Culture - Blood, Arm, Left  Once        \"And\" Linked Group Details    01/10/22 1150    " "01/10/22 1150  Blood Culture - Blood, Arm, Left  Once        Comments: From a different site than #1.     \"And\" Linked Group Details    01/10/22 1150    01/10/22 1150  Lactic Acid, Plasma  Once         01/10/22 1150    01/10/22 1150  Influenza Antigen, Rapid - Swab, Nasopharynx  STAT         01/10/22 1150    01/10/22 1150  COVID-19,APTIMA PANTHER(JAMES),[facility], NP/OP SWAB IN UTM/VTM/SALINE TRANSPORT MEDIA,24 HR TAT - Swab, Nasopharynx  Once         01/10/22 1150    01/10/22 1149  Blood Gas, Arterial -With Co-Ox Panel: Yes  Once         01/10/22 1148    01/10/22 1053  NPO Diet  Diet Effective Now         01/10/22 1052    01/10/22 1053  Undress and Gown  Once         01/10/22 1052    01/10/22 1053  Cardiac Monitoring  Per Hospital Policy         01/10/22 1052    01/10/22 1053  Continuous Pulse Oximetry  Continuous         01/10/22 1052    01/10/22 1053  Vital Signs  Per Hospital Policy         01/10/22 1052    01/10/22 1053  ECG 12 Lead  Once         01/10/22 1052    01/10/22 1053  XR Chest 1 View  1 Time Imaging         01/10/22 1052    01/10/22 1053  Insert Peripheral IV  Once         01/10/22 1052    01/10/22 1053  [place] Draw  Once         01/10/22 1052    01/10/22 1053  CBC & Differential  Once         01/10/22 1052    01/10/22 1053  Comprehensive Metabolic Panel  Once         01/10/22 1052    01/10/22 1053  BNP  Once         01/10/22 1052    01/10/22 1053  Troponin  Once         01/10/22 1052    01/10/22 1053  Green Top (Gel)  PROCEDURE ONCE         01/10/22 1052    01/10/22 1053  Lavender Top  PROCEDURE ONCE         01/10/22 1052    01/10/22 1053  Gold Top - SST  PROCEDURE ONCE         01/10/22 1052    01/10/22 1053  Light Blue Top  PROCEDURE ONCE         01/10/22 1052    01/10/22 1053  CBC Auto Differential  PROCEDURE ONCE         01/10/22 1052    01/10/22 1052  sodium chloride 0.9 % flush 10 mL  As Needed         01/10/22 1052    Unscheduled  Oxygen Therapy- Nasal Cannula; 2 LPM; Titrate for SPO2: equal " to or greater than, 92%  Continuous PRN       01/10/22 1052    Signed and Held  Basic Metabolic Panel  Morning Draw         Signed and Held    Signed and Held  CBC & Differential  Morning Draw         Signed and Held    Signed and Held  Magnesium  Morning Draw         Signed and Held    Signed and Held  Phosphorus  Morning Draw         Signed and Held    Signed and Held  Hepatic Function Panel  Morning Draw         Signed and Held    Signed and Held  Respiratory Infection Panel A  Once         Signed and Held    Signed and Held  Respiratory Culture - Sputum, Cough  Once         Signed and Held    Signed and Held  Legionella Antigen, Urine - Urine, Urine, Clean Catch  Once         Signed and Held    Signed and Held  Mycoplasma Pneumoniae Antibody, IgM - Blood, Arm, Left  Once         Signed and Held    Signed and Held  S. Pneumo Ag Urine or CSF - Urine, Urine, Clean Catch  Once         Signed and Held    Signed and Held  arformoterol (BROVANA) nebulizer solution 15 mcg  2 Times Daily - RT         Signed and Held    Signed and Held  budesonide (PULMICORT) nebulizer solution 0.5 mg  2 Times Daily - RT         Signed and Held    Signed and Held  ipratropium-albuterol (DUO-NEB) nebulizer solution 3 mL  4 Times Daily - RT         Signed and Held    Signed and Held  RT to Initiate Bronchopulmonary Hygiene Protocol  Once         Signed and Held    Signed and Held  methylPREDNISolone sodium succinate (SOLU-Medrol) injection 40 mg  Every 8 Hours         Signed and Held    Signed and Held  cefTRIAXone (ROCEPHIN) IVPB 1 g  Every 24 Hours         Signed and Held    Signed and Held  AZITHROMYCIN 500 MG/250 ML 0.9% NS IVPB (vial-mate)  Every 24 Hours         Signed and Held    Signed and Held  Vital Signs  Every 4 Hours       Signed and Held    Signed and Held  Telemetry - Maintain IV Access  Continuous         Signed and Held    Signed and Held  Continuous Cardiac Monitoring  Continuous         Signed and Held    Signed and Held   "Intake & Output  Every Shift       Signed and Held    Signed and Held  Weigh Patient  Once         Signed and Held    Signed and Held  Oxygen Therapy- Nasal Cannula; Titrate for SPO2: 90% - 95%  Continuous         Signed and Held    Signed and Held  Insert Peripheral IV  Once         Signed and Held    Signed and Held  Saline Lock & Maintain IV Access  Continuous         Signed and Held    Signed and Held  sodium chloride 0.9 % flush 10 mL  Every 12 Hours Scheduled         Signed and Held    Signed and Held  sodium chloride 0.9 % flush 10 mL  As Needed         Signed and Held    Signed and Held  enoxaparin (LOVENOX) syringe 40 mg  Every 24 Hours         Signed and Held    Signed and Held  Activity - [employer]els  Until Discontinued         Signed and Held    Signed and Held  Diet Regular  Diet Effective Now         Signed and Held    Signed and Held  acetaminophen (TYLENOL) tablet 650 mg  Every 4 Hours PRN         Signed and Held    Signed and Held  melatonin tablet 5 mg  Nightly PRN         Signed and Held    Signed and Held  sennosides-docusate (PERICOLACE) 8.6-50 MG per tablet 2 tablet  2 Times Daily        \"And\" Linked Group Details    Signed and Held    Signed and Held  polyethylene glycol (MIRALAX) packet 17 g  Daily PRN        \"And\" Linked Group Details    Signed and Held    Signed and Held  bisacodyl (DULCOLAX) EC tablet 5 mg  Daily PRN        \"And\" Linked Group Details    Signed and Held    Signed and Held  bisacodyl (DULCOLAX) suppository 10 mg  Daily PRN        \"And\" Linked Group Details    Signed and Held    Signed and Held  ondansetron (ZOFRAN) injection 4 mg  Every 6 Hours PRN         Signed and Held    Signed and Held  aluminum-magnesium hydroxide-simethicone (MAALOX MAX) 400-400-40 MG/5ML suspension 15 mL  Every 6 Hours PRN         Signed and Held                Respiratory Therapy (last 24 hours)     Respiratory Therapy Flowsheet     Row Name 01/10/22 1202 01/10/22 1201 01/10/22 11:03:58 01/10/22 1101 " 01/10/22 1100       $ Oximetry Charges    $ O2 Pt/System Assessment yes yes -- -- --       Oxygen Therapy    SpO2 94 % 91 % -- -- 91 %    Pulse Oximetry Type Continuous Continuous -- -- --    Device (Oxygen Therapy) room air; nasal cannula nasal cannula -- -- nasal cannula    Flow (L/min) 2 -- -- -- 2       Vital Signs    Temp -- -- -- -- 97.9 °F (36.6 °C)    Temp src -- -- -- -- Oral    Pulse 83 -- -- -- 89    Heart Rate Source Monitor -- -- -- Monitor    Resp 20 -- -- -- 15    Resp Rate Source Visual -- -- -- Visual    BP -- -- -- 159/72 --    BP Location -- -- -- Right arm Right arm    BP Method -- -- -- Automatic Automatic    Patient Position Sitting -- -- Sitting Sitting       Assessment    Respiratory WDL WDL except; breath sounds -- -- -- --       Breath Sounds    Breath Sounds -- All Gonzalez LLL; ESTRELLA -- --    All Lung Fields Breath Sounds -- Anterior:; Lateral:; diminished diminished -- --    ESTRELLA Breath Sounds -- -- diminished -- --    LLL Breath Sounds -- -- wheezes, expiratory -- --       Breath Sounds Post-Respiratory Treatment    Breath Sounds Posttreatment All Fields All Fields -- -- -- --    Breath Sounds Posttreatment All Fields no change -- -- -- --       Aerosol Therapy (SVN)    $ Mini Neb Initial -- yes -- -- --    $ Mini Neb Subsequent yes -- -- -- --    Respiratory Treatment Status (SVN) given given -- -- --    Treatment Route (SVN) mask mask -- -- --    Patient Position (SVN) semi-Escalera's semi-Escalera's -- -- --    Posttreatment Assessment (SVN) breath sounds unchanged breath sounds unchanged -- -- --    Signs of Intolerance (SVN) none none -- -- --              Consult Notes (last 24 hours)  Notes from 01/09/22 1930 through 01/10/22 1930   No notes of this type exist for this encounter.         Respiratory Therapy Notes (last 24 hours)  Notes from 01/09/22 1930 through 01/10/22 1930   No notes exist for this encounter.

## 2022-01-11 NOTE — PLAN OF CARE
Goal Outcome Evaluation:  Plan of Care Reviewed With: patient        Progress: improving  Outcome Summary: Pt arrived to unit via stretcher, pt complains of no pain or shortness of air at this time but states shortness of air has gotten worse with exertion the past few days, family en route to stay with pt due to history of falls and anxiety at being alone overnight, oriented to unit, pt denies any needs at this time, will continue to monitor

## 2022-01-12 ENCOUNTER — APPOINTMENT (OUTPATIENT)
Dept: GENERAL RADIOLOGY | Facility: HOSPITAL | Age: 72
End: 2022-01-12

## 2022-01-12 ENCOUNTER — APPOINTMENT (OUTPATIENT)
Dept: CT IMAGING | Facility: HOSPITAL | Age: 72
End: 2022-01-12

## 2022-01-12 LAB
ALBUMIN SERPL-MCNC: 4.1 G/DL (ref 3.5–5.2)
ALP SERPL-CCNC: 128 U/L (ref 39–117)
ALT SERPL W P-5'-P-CCNC: 13 U/L (ref 1–33)
ANION GAP SERPL CALCULATED.3IONS-SCNC: 14.9 MMOL/L (ref 5–15)
AST SERPL-CCNC: 22 U/L (ref 1–32)
BASOPHILS # BLD AUTO: 0.02 10*3/MM3 (ref 0–0.2)
BASOPHILS NFR BLD AUTO: 0.1 % (ref 0–1.5)
BILIRUB CONJ SERPL-MCNC: <0.2 MG/DL (ref 0–0.3)
BILIRUB INDIRECT SERPL-MCNC: ABNORMAL MG/DL
BILIRUB SERPL-MCNC: 0.3 MG/DL (ref 0–1.2)
BUN SERPL-MCNC: 37 MG/DL (ref 8–23)
BUN/CREAT SERPL: 33 (ref 7–25)
CALCIUM SPEC-SCNC: 9.5 MG/DL (ref 8.6–10.5)
CHLORIDE SERPL-SCNC: 104 MMOL/L (ref 98–107)
CO2 SERPL-SCNC: 23.1 MMOL/L (ref 22–29)
CREAT SERPL-MCNC: 1.12 MG/DL (ref 0.57–1)
DEPRECATED RDW RBC AUTO: 48 FL (ref 37–54)
EOSINOPHIL # BLD AUTO: 0 10*3/MM3 (ref 0–0.4)
EOSINOPHIL NFR BLD AUTO: 0 % (ref 0.3–6.2)
ERYTHROCYTE [DISTWIDTH] IN BLOOD BY AUTOMATED COUNT: 14.6 % (ref 12.3–15.4)
GFR SERPL CREATININE-BSD FRML MDRD: 48 ML/MIN/1.73
GLUCOSE SERPL-MCNC: 136 MG/DL (ref 65–99)
HCT VFR BLD AUTO: 41 % (ref 34–46.6)
HGB BLD-MCNC: 13.1 G/DL (ref 12–15.9)
IMM GRANULOCYTES # BLD AUTO: 0.08 10*3/MM3 (ref 0–0.05)
IMM GRANULOCYTES NFR BLD AUTO: 0.4 % (ref 0–0.5)
LYMPHOCYTES # BLD AUTO: 0.93 10*3/MM3 (ref 0.7–3.1)
LYMPHOCYTES NFR BLD AUTO: 5.2 % (ref 19.6–45.3)
MAGNESIUM SERPL-MCNC: 2.2 MG/DL (ref 1.6–2.4)
MCH RBC QN AUTO: 28.7 PG (ref 26.6–33)
MCHC RBC AUTO-ENTMCNC: 32 G/DL (ref 31.5–35.7)
MCV RBC AUTO: 89.7 FL (ref 79–97)
MONOCYTES # BLD AUTO: 0.59 10*3/MM3 (ref 0.1–0.9)
MONOCYTES NFR BLD AUTO: 3.3 % (ref 5–12)
NEUTROPHILS NFR BLD AUTO: 16.38 10*3/MM3 (ref 1.7–7)
NEUTROPHILS NFR BLD AUTO: 91 % (ref 42.7–76)
NRBC BLD AUTO-RTO: 0 /100 WBC (ref 0–0.2)
PHOSPHATE SERPL-MCNC: 3.7 MG/DL (ref 2.5–4.5)
PLATELET # BLD AUTO: 329 10*3/MM3 (ref 140–450)
PMV BLD AUTO: 9.4 FL (ref 6–12)
POTASSIUM SERPL-SCNC: 4.4 MMOL/L (ref 3.5–5.2)
PROT SERPL-MCNC: 7.3 G/DL (ref 6–8.5)
RBC # BLD AUTO: 4.57 10*6/MM3 (ref 3.77–5.28)
SODIUM SERPL-SCNC: 142 MMOL/L (ref 136–145)
WBC NRBC COR # BLD: 18 10*3/MM3 (ref 3.4–10.8)

## 2022-01-12 PROCEDURE — 94760 N-INVAS EAR/PLS OXIMETRY 1: CPT

## 2022-01-12 PROCEDURE — 84100 ASSAY OF PHOSPHORUS: CPT | Performed by: FAMILY MEDICINE

## 2022-01-12 PROCEDURE — 83735 ASSAY OF MAGNESIUM: CPT | Performed by: FAMILY MEDICINE

## 2022-01-12 PROCEDURE — 94799 UNLISTED PULMONARY SVC/PX: CPT

## 2022-01-12 PROCEDURE — 71250 CT THORAX DX C-: CPT

## 2022-01-12 PROCEDURE — 99232 SBSQ HOSP IP/OBS MODERATE 35: CPT | Performed by: FAMILY MEDICINE

## 2022-01-12 PROCEDURE — 80048 BASIC METABOLIC PNL TOTAL CA: CPT | Performed by: FAMILY MEDICINE

## 2022-01-12 PROCEDURE — 80076 HEPATIC FUNCTION PANEL: CPT | Performed by: FAMILY MEDICINE

## 2022-01-12 PROCEDURE — 25010000002 AZITHROMYCIN PER 500 MG: Performed by: FAMILY MEDICINE

## 2022-01-12 PROCEDURE — 25010000002 CEFTRIAXONE PER 250 MG: Performed by: FAMILY MEDICINE

## 2022-01-12 PROCEDURE — 97161 PT EVAL LOW COMPLEX 20 MIN: CPT

## 2022-01-12 PROCEDURE — 25010000002 METHYLPREDNISOLONE PER 40 MG: Performed by: FAMILY MEDICINE

## 2022-01-12 PROCEDURE — 97165 OT EVAL LOW COMPLEX 30 MIN: CPT

## 2022-01-12 PROCEDURE — 73610 X-RAY EXAM OF ANKLE: CPT

## 2022-01-12 PROCEDURE — 25010000002 ENOXAPARIN PER 10 MG: Performed by: FAMILY MEDICINE

## 2022-01-12 PROCEDURE — 85025 COMPLETE CBC W/AUTO DIFF WBC: CPT | Performed by: FAMILY MEDICINE

## 2022-01-12 RX ORDER — METHYLPREDNISOLONE SODIUM SUCCINATE 40 MG/ML
40 INJECTION, POWDER, LYOPHILIZED, FOR SOLUTION INTRAMUSCULAR; INTRAVENOUS EVERY 12 HOURS
Status: DISCONTINUED | OUTPATIENT
Start: 2022-01-12 | End: 2022-01-13

## 2022-01-12 RX ADMIN — IPRATROPIUM BROMIDE AND ALBUTEROL SULFATE 3 ML: .5; 2.5 SOLUTION RESPIRATORY (INHALATION) at 11:49

## 2022-01-12 RX ADMIN — METHYLPREDNISOLONE SODIUM SUCCINATE 40 MG: 40 INJECTION, POWDER, FOR SOLUTION INTRAMUSCULAR; INTRAVENOUS at 18:05

## 2022-01-12 RX ADMIN — FUROSEMIDE 40 MG: 40 TABLET ORAL at 08:25

## 2022-01-12 RX ADMIN — ARFORMOTEROL TARTRATE 15 MCG: 15 SOLUTION RESPIRATORY (INHALATION) at 06:56

## 2022-01-12 RX ADMIN — SODIUM CHLORIDE, PRESERVATIVE FREE 10 ML: 5 INJECTION INTRAVENOUS at 08:26

## 2022-01-12 RX ADMIN — CEFTRIAXONE SODIUM 1 G: 1 INJECTION, SOLUTION INTRAVENOUS at 08:26

## 2022-01-12 RX ADMIN — GABAPENTIN 800 MG: 400 CAPSULE ORAL at 12:07

## 2022-01-12 RX ADMIN — ATENOLOL 25 MG: 25 TABLET ORAL at 08:25

## 2022-01-12 RX ADMIN — ATORVASTATIN CALCIUM 40 MG: 40 TABLET, FILM COATED ORAL at 20:51

## 2022-01-12 RX ADMIN — HYDROCODONE BITARTRATE AND ACETAMINOPHEN 1 TABLET: 10; 325 TABLET ORAL at 08:25

## 2022-01-12 RX ADMIN — ARFORMOTEROL TARTRATE 15 MCG: 15 SOLUTION RESPIRATORY (INHALATION) at 19:23

## 2022-01-12 RX ADMIN — ESCITALOPRAM OXALATE 20 MG: 10 TABLET ORAL at 08:25

## 2022-01-12 RX ADMIN — BUDESONIDE 0.5 MG: 0.5 SUSPENSION RESPIRATORY (INHALATION) at 06:56

## 2022-01-12 RX ADMIN — Medication 5 MG: at 20:51

## 2022-01-12 RX ADMIN — METHYLPREDNISOLONE SODIUM SUCCINATE 40 MG: 40 INJECTION, POWDER, FOR SOLUTION INTRAMUSCULAR; INTRAVENOUS at 05:55

## 2022-01-12 RX ADMIN — BUDESONIDE 0.5 MG: 0.5 SUSPENSION RESPIRATORY (INHALATION) at 19:23

## 2022-01-12 RX ADMIN — AZITHROMYCIN MONOHYDRATE 500 MG: 500 INJECTION, POWDER, LYOPHILIZED, FOR SOLUTION INTRAVENOUS at 08:26

## 2022-01-12 RX ADMIN — ENOXAPARIN SODIUM 40 MG: 40 INJECTION SUBCUTANEOUS at 20:50

## 2022-01-12 RX ADMIN — ENOXAPARIN SODIUM 40 MG: 40 INJECTION SUBCUTANEOUS at 08:26

## 2022-01-12 RX ADMIN — IPRATROPIUM BROMIDE AND ALBUTEROL SULFATE 3 ML: .5; 2.5 SOLUTION RESPIRATORY (INHALATION) at 06:56

## 2022-01-12 RX ADMIN — GABAPENTIN 800 MG: 400 CAPSULE ORAL at 04:00

## 2022-01-12 RX ADMIN — ACETAMINOPHEN 650 MG: 325 TABLET ORAL at 23:02

## 2022-01-12 RX ADMIN — HYDROCODONE BITARTRATE AND ACETAMINOPHEN 1 TABLET: 10; 325 TABLET ORAL at 20:51

## 2022-01-12 RX ADMIN — IPRATROPIUM BROMIDE AND ALBUTEROL SULFATE 3 ML: .5; 2.5 SOLUTION RESPIRATORY (INHALATION) at 19:23

## 2022-01-12 RX ADMIN — GABAPENTIN 800 MG: 400 CAPSULE ORAL at 20:51

## 2022-01-12 RX ADMIN — SENNOSIDES AND DOCUSATE SODIUM 2 TABLET: 50; 8.6 TABLET ORAL at 20:51

## 2022-01-12 RX ADMIN — SODIUM CHLORIDE, PRESERVATIVE FREE 10 ML: 5 INJECTION INTRAVENOUS at 20:51

## 2022-01-12 NOTE — THERAPY EVALUATION
Patient Name: Ashtyn Jean Baptiste  : 1950    MRN: [id number]                              Today's Date: 2022       Admit Date: 1/10/2022    Visit Dx:     ICD-10-CM ICD-9-CM   1. Acute respiratory failure with hypoxia (HCC)  J96.01 518.81   2. Pneumonia of left lower lobe due to infectious organism  J18.9 486   3. Acute exacerbation of chronic obstructive pulmonary disease (COPD) (Formerly [facility])  J44.1 491.21   4. Decreased activities of daily living (ADL)  Z78.9 V49.89   5. Difficulty walking  R26.2 719.7     Patient Active Problem List   Diagnosis   • Acute respiratory failure with hypoxia (HCC)     Past Medical History:   Diagnosis Date   • CHF (congestive heart failure) (Formerly [facility])    • COPD (chronic obstructive pulmonary disease) (Formerly [facility])    • GERD (gastroesophageal reflux disease)      Past Surgical History:   Procedure Laterality Date   • APPENDECTOMY     • HYSTERECTOMY        General Information     Row Name 22 1308          OT Time and Intention    Document Type evaluation  -KN     Mode of Treatment individual therapy; occupational therapy  -     Row Name 22 1308          General Information    Patient Profile Reviewed yes  -KN     Prior Level of Function independent:; ADL's; all household mobility  (I) with DME  -KN     Existing Precautions/Restrictions no known precautions/restrictions  -KN     Barriers to Rehab none identified  -     Row Name 22 1308          Occupational Profile    Reason for Services/Referral (Occupational Profile) Patient is a 71year old female admittied to [facility] on January 10, 2020 with shortness of air. OT consulted due to recent decline in ADL/Transfer independence. No previous OT services for current condition.  -     Row Name 22 1308          Living Environment    Lives With child(donita), adult  -     Row Name 22 1308          Home Main Entrance    Number of Stairs, Main Entrance three  -KN     Stair Railings, Main Entrance railings on both  sides of stairs  -     Row Name 01/12/22 1308          Cognition    Orientation Status (Cognition) --  Intact  -     Row Name 01/12/22 1308          Safety Issues, Functional Mobility    Safety Issues Affecting Function (Mobility) ability to follow commands  -     Impairments Affecting Function (Mobility) balance; coordination; strength; endurance/activity tolerance; sensation/sensory awareness  -           User Key  (r) = Recorded By, (t) = Taken By, (c) = Cosigned By    Initials Name Provider Type    Mina Valdez OT Occupational Therapist                 Mobility/ADL's     Row Name 01/12/22 1312          Transfers    Comment (Transfers) Mod A bed to chair  -     Row Name 01/12/22 1312          Activities of Daily Living    BADL Assessment/Intervention --  (I) feeding with set up, min assist for basic ADLs.  -           User Key  (r) = Recorded By, (t) = Taken By, (c) = Cosigned By    Initials Name Provider Type    Mina Valdez OT Occupational Therapist               Obj/Interventions     Row Name 01/12/22 1315          Sensory Assessment (Somatosensory)    Sensory Assessment (Somatosensory) --  mild tingling bilaterally upper extremities  -     Row Name 01/12/22 1315          Vision Assessment/Intervention    Visual Impairment/Limitations WFL  -     Row Name 01/12/22 1315          Range of Motion Comprehensive    General Range of Motion no range of motion deficits identified  -     Row Name 01/12/22 1315          Strength Comprehensive (MMT)    Comment, General Manual Muscle Testing (MMT) Assessment 3/5 left upper extremity, 1/5 right upper extremity  -     Row Name 01/12/22 1315          Motor Skills    Motor Skills coordination; functional endurance  -     Coordination minimal impairment  -KN     Functional Endurance poor  -     Row Name 01/12/22 1315          Balance    Balance Assessment sitting static balance  -     Static Sitting Balance WFL  EOB  -           User Key  (r)  = Recorded By, (t) = Taken By, (c) = Cosigned By    Initials Name Provider Type    Mina Valdez, OT Occupational Therapist               Goals/Plan     Row Name 01/12/22 1322          Transfer Goal 1 (OT)    Activity/Assistive Device (Transfer Goal 1, OT) transfers, all  -KN     [place] Level/Cues Needed (Transfer Goal 1, OT) modified independence  -KN     Time Frame (Transfer Goal 1, OT) long term goal (LTG); 10 days  -     Row Name 01/12/22 1322          Bathing Goal 1 (OT)    Activity/Device (Bathing Goal 1, OT) bathing skills, all  -KN     [place] Level/Cues Needed (Bathing Goal 1, OT) modified independence  -KN     Time Frame (Bathing Goal 1, OT) long term goal (LTG); 10 days  -     Row Name 01/12/22 1322          Dressing Goal 1 (OT)    Activity/Device (Dressing Goal 1, OT) dressing skills, all  -KN     [place]/Cues Needed (Dressing Goal 1, OT) modified independence  -KN     Time Frame (Dressing Goal 1, OT) 10 days; long term goal (LTG)  -     Row Name 01/12/22 1322          Toileting Goal 1 (OT)    Activity/Device (Toileting Goal 1, OT) toileting skills, all  -KN     [place] Level/Cues Needed (Toileting Goal 1, OT) modified independence  -KN     Time Frame (Toileting Goal 1, OT) 10 days; long term goal (LTG)  -     Row Name 01/12/22 1322          Grooming Goal 1 (OT)    Activity/Device (Grooming Goal 1, OT) grooming skills, all  -KN     [place] (Grooming Goal 1, OT) independent  -KN     Time Frame (Grooming Goal 1, OT) 10 days; long term goal (LTG)  -     Row Name 01/12/22 1322          Strength Goal 1 (OT)    Strength Goal 1 (OT) Patient will increase bilateral upper extremity strength by 1 muscle grade.  -KN     Time Frame (Strength Goal 1, OT) 10 days; long term goal (LTG)  -     Row Name 01/12/22 1322          Problem Specific Goal 1 (OT)    Problem Specific Goal 1 (OT) Patient will demonstrate improved endurance to poor+ during ADL activity.  -KN     Time Frame  (Problem Specific Goal 1, OT) 10 days; long term goal (LTG)  -     Row Name 01/12/22 1322          Therapy Assessment/Plan (OT)    Planned Therapy Interventions (OT) activity tolerance training; BADL retraining; functional balance retraining; occupation/activity based interventions; strengthening exercise; patient/caregiver education/training  -           User Key  (r) = Recorded By, (t) = Taken By, (c) = Cosigned By    Initials Name Provider Type    Mina Valdez, NAI Occupational Therapist               Clinical Impression     Row Name 01/12/22 1319          Pain Assessment    Additional Documentation Pain Scale: Numbers Pre/Post-Treatment (Group)  -[facility] Name 01/12/22 1319          Pain Scale: Numbers Pre/Post-Treatment    Pretreatment Pain Rating 0/10 - no pain  -     Posttreatment Pain Rating 0/10 - no pain  -[facility] Name 01/12/22 1319          Plan of Care Review    Plan of Care Reviewed With patient; daughter  Simultaneous filing. User may be unaware of other data.  -     Progress no change  Simultaneous filing. User may be unaware of other data.  -     Outcome Summary Patient presents with limitations of shortness of air, sensation, strength, endurance, balance. Which impedes patients ability to perform ADLs independently. Skills of OT are needed to maximize independence.  Simultaneous filing. User may be unaware of other data.  -[facility] Name 01/12/22 1319          Therapy Assessment/Plan (OT)    Patient/Family Therapy Goal Statement (OT) Maximize independence  -     Rehab Potential (OT) good, to achieve stated therapy goals  -     Criteria for Skilled Therapeutic Interventions Met (OT) yes; meets criteria; skilled treatment is necessary  -     Therapy Frequency (OT) 5 times/wk  -     Row Name 01/12/22 1319          Therapy Plan Review/Discharge Plan (OT)    Equipment Needs Upon Discharge (OT) walker, rolling  -     Anticipated Discharge Disposition (OT) inpatient  rehabilitation facility; sub acute care setting  -     Row Name 01/12/22 1319          Vital Signs    O2 Delivery Pre Treatment nasal cannula  3  -KN     O2 Delivery Intra Treatment nasal cannula  3  -KN     O2 Delivery Post Treatment nasal cannula  3  -KN           User Key  (r) = Recorded By, (t) = Taken By, (c) = Cosigned By    Initials Name Provider Type    SHIRA Mina Serna, OT Occupational Therapist               Outcome Measures     Row Name 01/12/22 1330          How much help from another is currently needed...    Putting on and taking off regular lower body clothing? 3  -KN     Bathing (including washing, rinsing, and drying) 3  -KN     Toileting (which includes using toilet bed pan or urinal) 3  -KN     Putting on and taking off regular upper body clothing 3  -KN     Taking care of personal grooming (such as brushing teeth) 3  -KN     Eating meals 4  -KN     AM-PAC 6 Clicks Score (OT) 19  -KN     Row Name 01/12/22 1300 01/12/22 0737       How much help from another person do you currently need...    Turning from your back to your side while in flat bed without using bedrails? 4  -AV 4  -BF    Moving from lying on back to sitting on the side of a flat bed without bedrails? 3  -AV 3  -BF    Moving to and from a bed to a chair (including a wheelchair)? 2  -AV 3  -BF    Standing up from a chair using your arms (e.g., wheelchair, bedside chair)? 2  -AV 3  -BF    Climbing 3-5 steps with a railing? 1  -AV 2  -BF    To walk in hospital room? 2  -AV 2  -BF    AM-PAC 6 Clicks Score (PT) 14  -AV 17  -BF    Row Name 01/12/22 1330 01/12/22 1300       Functional Assessment    Outcome Measure Options AM-PAC 6 Clicks Daily Activity (OT); Optimal Instrument  -KN AM-PAC 6 Clicks Basic Mobility (PT)  -AV    Row Name 01/12/22 1330          Optimal Instrument    Optimal Instrument Optimal - 3  -KN     Bending/Stooping 2  -KN     Standing 2  -KN     Reaching 2  -KN     From the list, choose the 3 activities you would most like  to be able to do without any difficulty Bending/stooping; Reaching; Standing  -KN     Total Score Optimal - 3 6  -KN           User Key  (r) = Recorded By, (t) = Taken By, (c) = Cosigned By    Initials Name Provider Type    Naima Hendrickson, RN Registered Nurse    Magno Avila, PT Physical Therapist    Mina Valdez, OT Occupational Therapist                Occupational Therapy Education                 Title: PT OT SLP Therapies (Done)     Topic: Occupational Therapy (Done)     Point: ADL training (Done)     Description:   Instruct learner(s) on proper safety adaptation and remediation techniques during self care or transfers.   Instruct in proper use of assistive devices.              Learning Progress Summary           Patient Acceptance, E, VU by SHIRA at 1/12/2022 1331   Family Acceptance, E, VU by SHIRA at 1/12/2022 1331                   Point: Home exercise program (Done)     Description:   Instruct learner(s) on appropriate technique for monitoring, assisting and/or progressing therapeutic exercises/activities.              Learning Progress Summary           Patient Acceptance, E, VU by SHIRA at 1/12/2022 1331   Family Acceptance, E, VU by SHIRA at 1/12/2022 1331                   Point: Precautions (Done)     Description:   Instruct learner(s) on prescribed precautions during self-care and functional transfers.              Learning Progress Summary           Patient Acceptance, E, VU by SHIRA at 1/12/2022 1331   Family Acceptance, E, VU by SHIRA at 1/12/2022 1331                   Point: Body mechanics (Done)     Description:   Instruct learner(s) on proper positioning and spine alignment during self-care, functional mobility activities and/or exercises.              Learning Progress Summary           Patient Acceptance, E, VU by SHIRA at 1/12/2022 1331   Family Acceptance, E, VU by SHIRA at 1/12/2022 1331                               User Key     Initials Effective Dates Name Provider Type Delia SILVA  12/20/21 -  Mina Serna OT Occupational Therapist OT              OT Recommendation and Plan  Planned Therapy Interventions (OT): activity tolerance training, BADL retraining, functional balance retraining, occupation/activity based interventions, strengthening exercise, patient/caregiver education/training  Therapy Frequency (OT): 5 times/wk  Plan of Care Review  Plan of Care Reviewed With: patient, daughter (Simultaneous filing. User may be unaware of other data.)  Progress: no change (Simultaneous filing. User may be unaware of other data.)  Outcome Summary: Patient presents with limitations of shortness of air, sensation, strength, endurance, balance. Which impedes patients ability to perform ADLs independently. Skills of OT are needed to maximize independence. (Simultaneous filing. User may be unaware of other data.)     Time Calculation:    Time Calculation- OT     Row Name 01/12/22 1332             Time Calculation- OT    OT Received On 01/12/22  -KN      OT Goal Re-Cert Due Date 01/21/22  -KN              Untimed Charges    OT Eval/Re-eval Minutes 35  -KN              Total Minutes    Untimed Charges Total Minutes 35  -KN       Total Minutes 35  -KN            User Key  (r) = Recorded By, (t) = Taken By, (c) = Cosigned By    Initials Name Provider Type    KN Mina Serna OT Occupational Therapist              Therapy Charges for Today     Code Description Service Date Service Provider Modifiers Qty    01746593367 HC OT EVAL LOW COMPLEXITY 3 1/12/2022 Mina Serna OT GO 1               Mina Serna OT  1/12/2022

## 2022-01-12 NOTE — PLAN OF CARE
Goal Outcome Evaluation:  Plan of Care Reviewed With: patient           Outcome Summary: Patient slept peacefully most of shift, no c/o pain at this time. VSS, alert and oriented, patient ambulates with 1x assistance to BSC. No s/s of acute distress.

## 2022-01-12 NOTE — PLAN OF CARE
Goal Outcome Evaluation:  Plan of Care Reviewed With: patient        Progress: no change  Outcome Summary: Patient presents with deficits in balance, strength, transfers, and ambulation. Patient will benefit from skilled PT services to address these mobility deficits and decrease risk of falls. Recommend rolling walker and rehab placement following hospital discharge.

## 2022-01-12 NOTE — SIGNIFICANT NOTE
01/12/22 1320   Coping/Psychosocial   Additional Documentation Spiritual Care (Group)   Spiritual Care   Use of Spiritual Resources prayer   Spiritual Care Source  initiative  (daily rounding)   Spiritual Care Follow-Up follow-up planned regularly for general support   Response to Spiritual Care receptive of support; thanks expressed   Spiritual Care Interventions supportive conversation provided; prayer support provided   Spiritual Care Visit Type initial   Spiritual Care Request coping/stress of illness support   Receptivity to Spiritual Care visit welcomed   At time of visit pt is on 4MTU. Introductions made and my role explained.

## 2022-01-12 NOTE — SIGNIFICANT NOTE
01/12/22 1458   Plan   Plan SW followed up with PT/OT recommendations with pt. Pt is agreeable to go to rehab at discharge. SW sent referrals. SW will follow up with pt if bed offers are available.

## 2022-01-12 NOTE — THERAPY EVALUATION
Acute Care - Physical Therapy Initial Evaluation   [facility]     Patient Name: Ashtyn Jean Baptiste  : 1950  MRN: [id number]  Today's Date: 2022      Visit Dx:     ICD-10-CM ICD-9-CM   1. Acute respiratory failure with hypoxia (HCC)  J96.01 518.81   2. Pneumonia of left lower lobe due to infectious organism  J18.9 486   3. Acute exacerbation of chronic obstructive pulmonary disease (COPD) (HCC)  J44.1 491.21   4. Decreased activities of daily living (ADL)  Z78.9 V49.89   5. Difficulty walking  R26.2 719.7     Patient Active Problem List   Diagnosis   • Acute respiratory failure with hypoxia (HCC)     Past Medical History:   Diagnosis Date   • CHF (congestive heart failure) (HCC)    • COPD (chronic obstructive pulmonary disease) (HCC)    • GERD (gastroesophageal reflux disease)      Past Surgical History:   Procedure Laterality Date   • APPENDECTOMY     • HYSTERECTOMY       PT Assessment (last 12 hours)     PT Evaluation and Treatment     Row Name 22 1312          Physical Therapy Time and Intention    Document Type evaluation  -AV     Mode of Treatment individual therapy; physical therapy  -AV     Row Name 22 1312          General Information    Patient Profile Reviewed yes  -AV     Prior Level of Function independent:; all household mobility; gait; transfer; ADL's  Ambulates without an assistive device. No home O2.  -AV     Equipment Currently Used at Home none  -AV     Existing Precautions/Restrictions fall  -AV     Row Name 22 1312          Living Environment    Current Living Arrangements home/apartment/condo  -AV     Home Accessibility stairs to enter home  -AV     Lives With child(donita), adult  Son  -AV     Row Name 22 1312          Home Main Entrance    Number of Stairs, Main Entrance three  -AV     Stair Railings, Main Entrance railings on both sides of stairs  -AV     Row Name 22 1312          Range of Motion (ROM)    Range of Motion bilateral lower extremities; ROM is WFL   -AV     Row Name 01/12/22 1312          Strength (Manual Muscle Testing)    Strength (Manual Muscle Testing) bilateral lower extremities  3/5  -AV     Row Name 01/12/22 1312          Bed Mobility    Bed Mobility supine-sit; sit-supine  -AV     Supine-Sit [place] (Bed Mobility) minimum assist (75% patient effort)  -AV     Sit-Supine [place] (Bed Mobility) standby assist  -AV     Bed Mobility, Safety Issues decreased use of legs for bridging/pushing; decreased use of arms for pushing/pulling  -AV     Assistive Device (Bed Mobility) head of bed elevated; bed rails  -AV     Row Name 01/12/22 1319          Transfers    Transfers sit-stand transfer; stand pivot/stand step transfer  -AV     Sit-Stand [place] (Transfers) moderate assist (50% patient effort); maximum assist (25% patient effort)  -AV     Row Name 01/12/22 1319          Sit-Stand Transfer    Assistive Device (Sit-Stand Transfers) walker, front-wheeled  -AV     Row Name 01/12/22 1319          Stand Pivot/Stand Step Transfer    Stand Pivot/Stand Step [place] (Transfers) moderate assist (50% patient effort); maximum assist (25% patient effort)  -AV     Assistive Device (Stand Pivot Stand Step Transfer) --  No AD  -AV     Row Name 01/12/22 1319          Gait/Stairs (Locomotion)    Gait/Stairs Locomotion gait/ambulation independence; gait/ambulation assistive device; distance ambulated  -AV     [place] Level (Gait) moderate assist (50% patient effort)  -AV     Assistive Device (Gait) walker, front-wheeled  -AV     Distance in Feet (Gait) 5  bed>recliner  -AV     Bilateral Gait Deviations knee buckling, bilateral  -AV     Comment (Gait/Stairs) Patient with severe LOB and bilateral knee buckling during first initial ambulation attempt and was pulled back onto bed. Patient c/o right ankle pain after transferring from bed to recliner. Reports that morning she may have twisted it during a LOB. Nurse notified.  -AV     Row Name 01/12/22 1311           Safety Issues, Functional Mobility    Safety Issues Affecting Function (Mobility) ability to follow commands; awareness of need for assistance; impulsivity; judgment  -AV     Impairments Affecting Function (Mobility) balance; endurance/activity tolerance; pain; strength  -AV     Row Name 01/12/22 1319          Balance    Balance Assessment sit to stand dynamic balance; standing dynamic balance  -AV     Sit to Stand Dynamic Balance severe impairment  -AV     Dynamic Standing Balance severe impairment; supported; standing  -AV     Row Name 01/12/22 1320          Plan of Care Review    Plan of Care Reviewed With patient  -AV     Progress no change  -AV     Outcome Summary Patient presents with deficits in balance, strength, transfers, and ambulation. Patient will benefit from skilled PT services to address these mobility deficits and decrease risk of falls. Recommend rolling walker and rehab placement following hospital discharge.  -AV     Row Name 01/12/22 1319          Physical Therapy Goals    Bed Mobility Goal Selection (PT) bed mobility, PT goal 1  -AV     Transfer Goal Selection (PT) transfer, PT goal 1  -AV     Gait Training Goal Selection (PT) gait training, PT goal 1  -AV     Strength Goal Selection (PT) strength, PT goal 1  -AV     Row Name 01/12/22 1319          Bed Mobility Goal 1 (PT)    Activity/Assistive Device (Bed Mobility Goal 1, PT) bed mobility activities, all  -AV     [place] Level/Cues Needed (Bed Mobility Goal 1, PT) independent  -AV     Time Frame (Bed Mobility Goal 1, PT) 10 days  -AV     Row Name 01/12/22 1319          Transfer Goal 1 (PT)    Activity/Assistive Device (Transfer Goal 1, PT) transfers, all; walker, rolling  -AV     [place] Level/Cues Needed (Transfer Goal 1, PT) supervision required  -AV     Time Frame (Transfer Goal 1, PT) 10 days  -AV     Row Name 01/12/22 1319          Gait Training Goal 1 (PT)    Activity/Assistive Device (Gait Training Goal 1, PT) gait  (walking locomotion); assistive device use; walker, rolling  -AV     [place] Level (Gait Training Goal 1, PT) supervision required  -AV     Distance (Gait Training Goal 1, PT) 100  -AV     Time Frame (Gait Training Goal 1, PT) 10 days  -AV     Row Name 01/12/22 1319          Strength Goal 1 (PT)    Strength Goal 1 (PT) Patient will demonstrate improvement in bilateral lower extremity strength to 4/5.  -AV     Time Frame (Strength Goal 1, PT) 10 days  -AV     Row Name 01/12/22 1319          Therapy Assessment/Plan (PT)    Rehab Potential (PT) good, to achieve stated therapy goals  -AV     Criteria for Skilled Interventions Met (PT) yes; skilled treatment is necessary  -AV     Problem List (PT) problems related to; balance; mobility; strength; pain  -AV     Activity Limitations Related to Problem List (PT) unable to transfer safely; unable to ambulate safely  -AV     Row Name 01/12/22 1319          PT Evaluation Complexity    History, PT Evaluation Complexity 1-2 personal factors and/or comorbidities  -AV     Examination of Body Systems (PT Eval Complexity) total of 4 or more elements  -AV     Clinical Presentation (PT Evaluation Complexity) stable  -AV     Clinical Decision Making (PT Evaluation Complexity) low complexity  -AV     Overall Complexity (PT Evaluation Complexity) low complexity  -AV     Row Name 01/12/22 1319          Therapy Plan Review/Discharge Plan (PT)    Therapy Plan Review (PT) evaluation/treatment results reviewed; patient  -AV           User Key  (r) = Recorded By, (t) = Taken By, (c) = Cosigned By    Initials Name Provider Type    AV Magno Amador, PT Physical Therapist                Physical Therapy Education                 Title: PT OT SLP Therapies (In Progress)     Topic: Physical Therapy (Done)     Point: Mobility training (Done)     Learning Progress Summary           Patient Acceptance, E,TB, VU,NR by AV at 1/12/2022 1328                   Point: Home exercise program (Done)      Learning Progress Summary           Patient Acceptance, E,TB, VU,NR by AV at 1/12/2022 1328                   Point: Body mechanics (Done)     Learning Progress Summary           Patient Acceptance, E,TB, VU,NR by AV at 1/12/2022 1328                   Point: Precautions (Done)     Learning Progress Summary           Patient Acceptance, E,TB, VU,NR by AV at 1/12/2022 1328                               User Key     Initials Effective Dates Name Provider Type Discipline    AV 06/11/21 -  Magno Amador, PT Physical Therapist PT              PT Recommendation and Plan  Anticipated Discharge Disposition (PT): inpatient rehabilitation facility, sub acute care setting  Planned Therapy Interventions (PT): balance training, bed mobility training, gait training, strengthening, transfer training, neuromuscular re-education  Therapy Frequency (PT): daily  Plan of Care Reviewed With: patient  Progress: no change  Outcome Summary: Patient presents with deficits in balance, strength, transfers, and ambulation. Patient will benefit from skilled PT services to address these mobility deficits and decrease risk of falls. Recommend rolling walker and rehab placement following hospital discharge.   Outcome Measures     Row Name 01/12/22 1300             How much help from another person do you currently need...    Turning from your back to your side while in flat bed without using bedrails? 4  -AV      Moving from lying on back to sitting on the side of a flat bed without bedrails? 3  -AV      Moving to and from a bed to a chair (including a wheelchair)? 2  -AV      Standing up from a chair using your arms (e.g., wheelchair, bedside chair)? 2  -AV      Climbing 3-5 steps with a railing? 1  -AV      To walk in hospital room? 2  -AV      AM-PAC 6 Clicks Score (PT) 14  -AV              Functional Assessment    Outcome Measure Options AM-PAC 6 Clicks Basic Mobility (PT)  -AV            User Key  (r) = Recorded By, (t) = Taken By, (c)  = Cosigned By    Initials Name Provider Type    Magno Avila, PT Physical Therapist                 Time Calculation:    PT Charges     Row Name 01/12/22 1326             Time Calculation    PT Received On 01/12/22  -AV      PT Goal Re-Cert Due Date 01/21/22  -AV              Untimed Charges    PT Eval/Re-eval Minutes 35  -AV              Total Minutes    Untimed Charges Total Minutes 35  -AV       Total Minutes 35  -AV            User Key  (r) = Recorded By, (t) = Taken By, (c) = Cosigned By    Initials Name Provider Type    Magno Avila, PT Physical Therapist              Therapy Charges for Today     Code Description Service Date Service Provider Modifiers Qty    [id number] HC PT EVAL LOW COMPLEXITY 3 1/12/2022 Magno Amador, PT GP 1          PT G-Codes  Outcome Measure Options: AM-PAC 6 Clicks Basic Mobility (PT)  AM-PAC 6 Clicks Score (PT): 14    Magno Amador, PT  1/12/2022

## 2022-01-12 NOTE — PLAN OF CARE
Goal Outcome Evaluation:           Progress: improving  Outcome Summary: Patient very weak, working with PT. Will need to be evaluated for rehab.

## 2022-01-12 NOTE — PLAN OF CARE
Goal Outcome Evaluation:  Plan of Care Reviewed With: patient, daughter (Simultaneous filing. User may be unaware of other data.)        Progress: no change (Simultaneous filing. User may be unaware of other data.)  Outcome Summary: Patient presents with limitations of shortness of air, sensation, strength, endurance, balance. Which impedes patients ability to perform ADLs independently. Skills of OT are needed to maximize independence. (Simultaneous filing. User may be unaware of other data.)

## 2022-01-12 NOTE — PROGRESS NOTES
[facility]   Hospitalist Progress Note  Date: 2022  Patient Name: Ashtyn Jean Baptiste  : 1950  MRN: [id number]  Date of admission: 1/10/2022      Subjective   Subjective     Chief complaint: Shortness of breath     Summary:  71-year-old female with history of COPD, GERD without esophagitis, CHF not otherwise specified was hospitalized on 1/10/2022 with shortness of breath symptoms, left lower lobe pneumonia, acute hypoxemic respiratory failure secondary to left lower lobe pneumonia, ruled out for COVID-19, COPD with acute exacerbation, increased work of breathing and conversational dyspnea, started on supplemental oxygen, empiric antibiotics, preliminary sputum culture showing gram-positive bacilli and cocci.     Interval follow-up: Patient seen and examined this morning, no acute distress, no acute major overnight events, patient remains weak and debilitated, physical therapy and Occupational Therapy have been consulted, she remains on 3 L nasal cannula oxygen with sats in the 90s, she has conversational dyspnea at times, she still has significant cough and shortness of breath symptoms.  Her white blood cell count is 18,000, her creatinine is 1.12.  Attempts to ambulate her out of bed resulted in her twisting her right ankle, she noted pain after twisting injury, an x-ray was obtained which showed no significant fracture.  Her culture results have not yielded any specific organism, her respiratory culture still is in preliminary growth process.  She has remained afebrile.  She denies chest pain or palpitations.    Review of systems:  All systems reviewed and negative except for significant cough and shortness of breath symptoms, increasing oxygen requirement steadily at 3 L, generalized weakness, right ankle pain.  Difficulty ambulating.    Objective   Objective     Vitals:   Temp:  [97.7 °F (36.5 °C)-98.6 °F (37 °C)] 98.2 °F (36.8 °C)  Heart Rate:  [82-99] 85  Resp:  [16-18] 18  BP: (113-165)/(60-71)  129/71  Flow (L/min):  [3] 3  Physical Exam    Constitutional: Awake, alert, no acute distress morbidly obese, on 3 L nasal cannula              Eyes: Pupils equal, sclerae anicteric, no conjunctival injection              HENT: NCAT, mucous membranes moist, sinus congestion              Neck: Supple, full range of motion              Respiratory: Air entry bilaterally, coarse breath sounds throughout, expiratory wheeze              Cardiovascular: RRR, no murmurs, rubs, or gallops, palpable pedal pulses bilaterally              Gastrointestinal: Positive bowel sounds, soft, nontender, nondistended              Musculoskeletal: No bilateral ankle edema, no clubbing or cyanosis to extremities              Psychiatric: Appropriate affect, cooperative              Neurologic: Oriented x 3, strength symmetric in all extremities, Cranial Nerves grossly intact to confrontation, speech clear              Skin: No rashes     Result Review    Result Review:  I have personally reviewed the results from the time of this admission to 1/12/2022 17:33 EST and agree with these findings:  [x]  Laboratory   CBC    CBC 1/10/22 1/11/22 1/12/22   WBC 11.36 (A) 9.25 18.00 (A)   RBC 4.84 4.51 4.57   Hemoglobin 13.7 12.8 13.1   Hematocrit 42.9 40.0 41.0   MCV 88.6 88.7 89.7   MCH 28.3 28.4 28.7   MCHC 31.9 32.0 32.0   RDW 14.3 14.4 14.6   Platelets 331 304 329   (A) Abnormal value            BMP    BMP 1/10/22 1/11/22 1/12/22   BUN 24 (A) 32 (A) 37 (A)   Creatinine 0.83 1.13 (A) 1.12 (A)   Sodium 137 140 142   Potassium 3.9 4.0 4.4   Chloride 103 104 104   CO2 20.9 (A) 24.0 23.1   Calcium 8.5 (A) 9.6 9.5   (A) Abnormal value       Comments are available for some flowsheets but are not being displayed.           LIVER FUNCTION TESTS:      Lab 01/12/22  0452 01/11/22  0504 01/10/22  1059   TOTAL PROTEIN 7.3 7.6 7.5   ALBUMIN 4.10 4.20 3.90   GLOBULIN  --   --  3.6   ALT (SGPT) 13 12 11   AST (SGOT) 22 13 16   BILIRUBIN 0.3 0.3 0.4    BILIRUBIN DIRECT <0.2 <0.2  --    ALK PHOS 128* 139* 129*       [x]  Microbiology   Blood Culture   Date Value Ref Range Status   01/10/2022 No growth at 2 days  Preliminary   01/10/2022 No growth at 2 days  Preliminary     No results found for: BCIDPCR, CXREFLEX, CSFCX, CULTURETIS  No results found for: CULTURES, HSVCX, URCX  No results found for: EYECULTURE, GCCX, HSVCULTURE, LABHSV  No results found for: LEGIONELLA, MRSACX, MUMPSCX, MYCOPLASCX  No results found for: NOCARDIACX, STOOLCX  No results found for: THROATCX, UNSTIMCULT, URINECX, CULTURE, VZVCULTUR  No results found for: VIRALCULTU, WOUNDCX    [x]  Radiology XR Ankle 3+ View Right    Result Date: 1/12/2022  PROCEDURE: XR ANKLE 3+ VW RIGHT  COMPARISON: None  INDICATIONS: RIGHT ANKLE PAIN FROM TWISTING INJURY  FINDINGS:  No fracture.  No dislocation.  Small plantar spur.       Small plantar spur.  No acute findings      MICHAEL LERNER MD       Electronically Signed and Approved By: MICHAEL LERNER MD on 1/12/2022 at 15:03             CT Chest Without Contrast Diagnostic    Result Date: 1/12/2022  PROCEDURE: CT CHEST WO CONTRAST DIAGNOSTIC  COMPARISON: None  INDICATIONS: Pneumonia, effusion or abscess suspected  TECHNIQUE: CT images were created without the administration of contrast material.   PROTOCOL:   Standard imaging protocol performed    RADIATION:   DLP: 552mGy*cm   Automated exposure control was utilized to minimize radiation dose.  FINDINGS:  Scattered areas of predominately subpleural interstitial prominence.  There is emphysema.  There is no dense consolidation.  No pleural fluid.  No adenopathy in the chest.  No acute findings in the included upper abdomen.       Scattered areas of predominately subpleural interstitial prominence may represent subpleural scarring or possibly interstitial lung disease.  Post infectious or inflammatory change could have this appearance.  No dense consolidation.     MICHAEL LERNER MD       Electronically Signed and  Approved By: MICHAEL LERNER MD on 1/12/2022 at 8:07             XR Chest 1 View    Result Date: 1/10/2022  PROCEDURE: XR CHEST 1 VW  COMPARISON: None  INDICATIONS: SOB, CHEST PAINS  FINDINGS:  Heart size is upper limits of normal.  Interstitial prominence in the mid to lower lung zones.  There is an area of more dense opacity in the lateral left lower lung zone.       Findings suspicious for pneumonia, particularly in the left lower lung zone.  Correlate with presentation.       MICHAEL LERNER MD       Electronically Signed and Approved By: MICHAEL LERNER MD on 1/10/2022 at 11:20               [x]  EKG/Telemetry   []  Cardiology/Vascular   [x]  Pathology  [x]  Old records  []  Other:    Assessment/Plan   Assessment / Plan     Assessment/Plan:  Assessment:  Left lower lobe pneumonia likely community-acquired pneumonia staph versus strep versus atypical  Acute hypoxemic respiratory failure secondary to pneumonia  COPD with acute exacerbation  Increased work of breathing  Morbid obesity  Leukocytosis  History of CHF unspecified diastolic or systolic  GERD without esophagitis  Physical weakness and debility  Difficulty ambulating  Right ankle pain secondary to misstep     Plan:  Labs and imaging reviewed  Ankle x-ray reviewed, no fracture  PT/OT consulted  Follow-up sputum culture still pending  Continue with IV ceftriaxone 1 g daily for 7 days  Continue with IV azithromycin 500 milligrams daily for 3 days total  Out of bed to chair if she can tolerate  Wean oxygen per tolerance  CT chest ordered and reviewed to evaluate for further etiologies of hypoxemia, has subpleural interstitial prominence, subpleural scarring, could have interstitial lung disease, though this could also be postinfectious or inflammatory change, will refer to pulmonology as outpatient for repeat CT scan in 3 months  Telemetry monitoring continued  Continue with [place] James nebs twice daily  Continue with DuoNebs every 6 hours  Bronchopulmonary  hygiene protocol  Bronchodilator protocol  Reduce Solu-Medrol 40 mg IV to every 12 hours  A.m. labs  Full code  VT prophylaxis Lovenox  Clinical course to dictate further management  Discussed with nurse at the bedside  Social work to send out referrals for rehab  DVT prophylaxis:  Medical DVT prophylaxis orders are present.    CODE STATUS:   Level Of Support Discussed With: Patient  Code Status (Patient has no pulse and is not breathing): CPR (Attempt to Resuscitate)  Medical Interventions (Patient has pulse or is breathing): Full Support        Electronically signed by Tripp Arzate MD, 01/12/22, 5:33 PM EST.

## 2022-01-13 LAB
ALBUMIN SERPL-MCNC: 4.4 G/DL (ref 3.5–5.2)
ALP SERPL-CCNC: 142 U/L (ref 39–117)
ALT SERPL W P-5'-P-CCNC: 25 U/L (ref 1–33)
ANION GAP SERPL CALCULATED.3IONS-SCNC: 14.2 MMOL/L (ref 5–15)
AST SERPL-CCNC: 43 U/L (ref 1–32)
BACTERIA SPEC RESP CULT: NORMAL
BASOPHILS # BLD AUTO: 0.03 10*3/MM3 (ref 0–0.2)
BASOPHILS NFR BLD AUTO: 0.2 % (ref 0–1.5)
BILIRUB CONJ SERPL-MCNC: <0.2 MG/DL (ref 0–0.3)
BILIRUB INDIRECT SERPL-MCNC: ABNORMAL MG/DL
BILIRUB SERPL-MCNC: 0.3 MG/DL (ref 0–1.2)
BUN SERPL-MCNC: 48 MG/DL (ref 8–23)
BUN/CREAT SERPL: 34.5 (ref 7–25)
CALCIUM SPEC-SCNC: 9.5 MG/DL (ref 8.6–10.5)
CHLAMYDIA IGG SER-ACNC: <0.91 RATIO (ref 0–0.9)
CHLORIDE SERPL-SCNC: 101 MMOL/L (ref 98–107)
CO2 SERPL-SCNC: 23.8 MMOL/L (ref 22–29)
CREAT SERPL-MCNC: 1.39 MG/DL (ref 0.57–1)
DEPRECATED RDW RBC AUTO: 46.5 FL (ref 37–54)
EOSINOPHIL # BLD AUTO: 0 10*3/MM3 (ref 0–0.4)
EOSINOPHIL NFR BLD AUTO: 0 % (ref 0.3–6.2)
ERYTHROCYTE [DISTWIDTH] IN BLOOD BY AUTOMATED COUNT: 14.5 % (ref 12.3–15.4)
GFR SERPL CREATININE-BSD FRML MDRD: 37 ML/MIN/1.73
GLUCOSE SERPL-MCNC: 139 MG/DL (ref 65–99)
GRAM STN SPEC: NORMAL
HADV AB TITR SER CF: ABNORMAL {TITER}
HCT VFR BLD AUTO: 41.4 % (ref 34–46.6)
HGB BLD-MCNC: 13.5 G/DL (ref 12–15.9)
IMM GRANULOCYTES # BLD AUTO: 0.12 10*3/MM3 (ref 0–0.05)
IMM GRANULOCYTES NFR BLD AUTO: 0.7 % (ref 0–0.5)
L PNEUMO AB SER IA-ACNC: <0.91 OD RATIO (ref 0–0.9)
LYMPHOCYTES # BLD AUTO: 1.67 10*3/MM3 (ref 0.7–3.1)
LYMPHOCYTES NFR BLD AUTO: 9.8 % (ref 19.6–45.3)
M PNEUMO IGG SER IA-ACNC: 400 U/ML (ref 0–99)
M PNEUMO IGM SER IA-ACNC: <770 U/ML (ref 0–769)
MAGNESIUM SERPL-MCNC: 2.4 MG/DL (ref 1.6–2.4)
MCH RBC QN AUTO: 28.8 PG (ref 26.6–33)
MCHC RBC AUTO-ENTMCNC: 32.6 G/DL (ref 31.5–35.7)
MCV RBC AUTO: 88.5 FL (ref 79–97)
MONOCYTES # BLD AUTO: 0.84 10*3/MM3 (ref 0.1–0.9)
MONOCYTES NFR BLD AUTO: 4.9 % (ref 5–12)
NEUTROPHILS NFR BLD AUTO: 14.44 10*3/MM3 (ref 1.7–7)
NEUTROPHILS NFR BLD AUTO: 84.4 % (ref 42.7–76)
NRBC BLD AUTO-RTO: 0 /100 WBC (ref 0–0.2)
NT-PROBNP SERPL-MCNC: 353.2 PG/ML (ref 0–900)
PHOSPHATE SERPL-MCNC: 4.1 MG/DL (ref 2.5–4.5)
PLATELET # BLD AUTO: 344 10*3/MM3 (ref 140–450)
PMV BLD AUTO: 9.1 FL (ref 6–12)
POTASSIUM SERPL-SCNC: 4.2 MMOL/L (ref 3.5–5.2)
PROT SERPL-MCNC: 7.9 G/DL (ref 6–8.5)
RBC # BLD AUTO: 4.68 10*6/MM3 (ref 3.77–5.28)
SODIUM SERPL-SCNC: 139 MMOL/L (ref 136–145)
WBC NRBC COR # BLD: 17.1 10*3/MM3 (ref 3.4–10.8)

## 2022-01-13 PROCEDURE — 80048 BASIC METABOLIC PNL TOTAL CA: CPT | Performed by: FAMILY MEDICINE

## 2022-01-13 PROCEDURE — 94799 UNLISTED PULMONARY SVC/PX: CPT

## 2022-01-13 PROCEDURE — 25010000002 METHYLPREDNISOLONE PER 40 MG: Performed by: FAMILY MEDICINE

## 2022-01-13 PROCEDURE — 25010000002 PROCHLORPERAZINE 10 MG/2ML SOLUTION: Performed by: HOSPITALIST

## 2022-01-13 PROCEDURE — 99232 SBSQ HOSP IP/OBS MODERATE 35: CPT | Performed by: FAMILY MEDICINE

## 2022-01-13 PROCEDURE — 63710000001 PREDNISONE PER 1 MG: Performed by: FAMILY MEDICINE

## 2022-01-13 PROCEDURE — 84100 ASSAY OF PHOSPHORUS: CPT | Performed by: FAMILY MEDICINE

## 2022-01-13 PROCEDURE — 83735 ASSAY OF MAGNESIUM: CPT | Performed by: FAMILY MEDICINE

## 2022-01-13 PROCEDURE — 97530 THERAPEUTIC ACTIVITIES: CPT

## 2022-01-13 PROCEDURE — 25010000002 CEFTRIAXONE PER 250 MG: Performed by: FAMILY MEDICINE

## 2022-01-13 PROCEDURE — 80076 HEPATIC FUNCTION PANEL: CPT | Performed by: FAMILY MEDICINE

## 2022-01-13 PROCEDURE — 83880 ASSAY OF NATRIURETIC PEPTIDE: CPT | Performed by: FAMILY MEDICINE

## 2022-01-13 PROCEDURE — 85025 COMPLETE CBC W/AUTO DIFF WBC: CPT | Performed by: FAMILY MEDICINE

## 2022-01-13 PROCEDURE — 25010000002 ONDANSETRON PER 1 MG: Performed by: FAMILY MEDICINE

## 2022-01-13 PROCEDURE — 25010000002 ENOXAPARIN PER 10 MG: Performed by: FAMILY MEDICINE

## 2022-01-13 RX ORDER — TRAZODONE HYDROCHLORIDE 50 MG/1
25 TABLET ORAL ONCE AS NEEDED
Status: COMPLETED | OUTPATIENT
Start: 2022-01-13 | End: 2022-01-13

## 2022-01-13 RX ORDER — PREDNISONE 20 MG/1
40 TABLET ORAL
Status: DISCONTINUED | OUTPATIENT
Start: 2022-01-13 | End: 2022-01-15

## 2022-01-13 RX ADMIN — GABAPENTIN 800 MG: 400 CAPSULE ORAL at 11:58

## 2022-01-13 RX ADMIN — BUDESONIDE 0.5 MG: 0.5 SUSPENSION RESPIRATORY (INHALATION) at 06:38

## 2022-01-13 RX ADMIN — ACETAMINOPHEN 650 MG: 325 TABLET ORAL at 20:59

## 2022-01-13 RX ADMIN — ENOXAPARIN SODIUM 40 MG: 40 INJECTION SUBCUTANEOUS at 20:59

## 2022-01-13 RX ADMIN — PROCHLORPERAZINE EDISYLATE 5 MG: 5 INJECTION INTRAMUSCULAR; INTRAVENOUS at 20:59

## 2022-01-13 RX ADMIN — IPRATROPIUM BROMIDE AND ALBUTEROL SULFATE 3 ML: .5; 2.5 SOLUTION RESPIRATORY (INHALATION) at 12:12

## 2022-01-13 RX ADMIN — SENNOSIDES AND DOCUSATE SODIUM 2 TABLET: 50; 8.6 TABLET ORAL at 08:04

## 2022-01-13 RX ADMIN — HYDROCODONE BITARTRATE AND ACETAMINOPHEN 1 TABLET: 10; 325 TABLET ORAL at 17:49

## 2022-01-13 RX ADMIN — IPRATROPIUM BROMIDE AND ALBUTEROL SULFATE 3 ML: .5; 2.5 SOLUTION RESPIRATORY (INHALATION) at 00:35

## 2022-01-13 RX ADMIN — ONDANSETRON 4 MG: 2 INJECTION INTRAMUSCULAR; INTRAVENOUS at 17:48

## 2022-01-13 RX ADMIN — FUROSEMIDE 40 MG: 40 TABLET ORAL at 08:04

## 2022-01-13 RX ADMIN — SODIUM CHLORIDE, PRESERVATIVE FREE 10 ML: 5 INJECTION INTRAVENOUS at 21:01

## 2022-01-13 RX ADMIN — PREDNISONE 40 MG: 20 TABLET ORAL at 08:04

## 2022-01-13 RX ADMIN — SODIUM CHLORIDE, PRESERVATIVE FREE 10 ML: 5 INJECTION INTRAVENOUS at 08:04

## 2022-01-13 RX ADMIN — IPRATROPIUM BROMIDE AND ALBUTEROL SULFATE 3 ML: .5; 2.5 SOLUTION RESPIRATORY (INHALATION) at 06:38

## 2022-01-13 RX ADMIN — ENOXAPARIN SODIUM 40 MG: 40 INJECTION SUBCUTANEOUS at 08:04

## 2022-01-13 RX ADMIN — IPRATROPIUM BROMIDE AND ALBUTEROL SULFATE 3 ML: .5; 2.5 SOLUTION RESPIRATORY (INHALATION) at 18:41

## 2022-01-13 RX ADMIN — SENNOSIDES AND DOCUSATE SODIUM 2 TABLET: 50; 8.6 TABLET ORAL at 21:00

## 2022-01-13 RX ADMIN — GABAPENTIN 800 MG: 400 CAPSULE ORAL at 21:01

## 2022-01-13 RX ADMIN — HYDROCODONE BITARTRATE AND ACETAMINOPHEN 1 TABLET: 10; 325 TABLET ORAL at 03:54

## 2022-01-13 RX ADMIN — METHYLPREDNISOLONE SODIUM SUCCINATE 40 MG: 40 INJECTION, POWDER, FOR SOLUTION INTRAMUSCULAR; INTRAVENOUS at 05:37

## 2022-01-13 RX ADMIN — Medication 5 MG: at 21:00

## 2022-01-13 RX ADMIN — CEFTRIAXONE SODIUM 1 G: 1 INJECTION, SOLUTION INTRAVENOUS at 08:05

## 2022-01-13 RX ADMIN — TRAZODONE HYDROCHLORIDE 25 MG: 50 TABLET ORAL at 20:59

## 2022-01-13 RX ADMIN — ATENOLOL 25 MG: 25 TABLET ORAL at 08:04

## 2022-01-13 RX ADMIN — BUDESONIDE 0.5 MG: 0.5 SUSPENSION RESPIRATORY (INHALATION) at 18:41

## 2022-01-13 RX ADMIN — HYDROCODONE BITARTRATE AND ACETAMINOPHEN 1 TABLET: 10; 325 TABLET ORAL at 11:55

## 2022-01-13 RX ADMIN — ARFORMOTEROL TARTRATE 15 MCG: 15 SOLUTION RESPIRATORY (INHALATION) at 18:41

## 2022-01-13 RX ADMIN — GABAPENTIN 800 MG: 400 CAPSULE ORAL at 03:54

## 2022-01-13 RX ADMIN — ATORVASTATIN CALCIUM 40 MG: 40 TABLET, FILM COATED ORAL at 21:00

## 2022-01-13 RX ADMIN — ESCITALOPRAM OXALATE 20 MG: 10 TABLET ORAL at 08:04

## 2022-01-13 RX ADMIN — ARFORMOTEROL TARTRATE 15 MCG: 15 SOLUTION RESPIRATORY (INHALATION) at 06:39

## 2022-01-13 NOTE — SIGNIFICANT NOTE
01/13/22 1435   Plan   Plan [facility] can offer bed, [facility] can offer bed, and [facility] can offer a bed. SW discussed with pt. Pt states she is going to discuss with daughter this afternoon and notify SW.

## 2022-01-13 NOTE — THERAPY TREATMENT NOTE
Patient Name: Ashtyn Jean Baptiste  : 1950    MRN: [id number]                              Today's Date: 2022       Admit Date: 1/10/2022    Visit Dx:     ICD-10-CM ICD-9-CM   1. Acute respiratory failure with hypoxia (HCC)  J96.01 518.81   2. Pneumonia of left lower lobe due to infectious organism  J18.9 486   3. Acute exacerbation of chronic obstructive pulmonary disease (COPD) (HCC)  J44.1 491.21   4. Decreased activities of daily living (ADL)  Z78.9 V49.89   5. Difficulty walking  R26.2 719.7     Patient Active Problem List   Diagnosis   • Acute respiratory failure with hypoxia (HCC)     Past Medical History:   Diagnosis Date   • CHF (congestive heart failure) (HCC)    • COPD (chronic obstructive pulmonary disease) (HCC)    • GERD (gastroesophageal reflux disease)      Past Surgical History:   Procedure Laterality Date   • APPENDECTOMY     • HYSTERECTOMY        General Information     Row Name 22 1350          OT Time and Intention    Document Type therapy note (daily note)  -SHIRA     Mode of Treatment individual therapy; occupational therapy  -SHIRA     Row Name 22 135          General Information    Existing Precautions/Restrictions fall  -     Row Name 22 135          Safety Issues, Functional Mobility    Impairments Affecting Function (Mobility) balance; endurance/activity tolerance; pain; strength  -SHIRA           User Key  (r) = Recorded By, (t) = Taken By, (c) = Cosigned By    Initials Name Provider Type    Mina Valdez OT Occupational Therapist                 Mobility/ADL's     Row Name 22 135          Bed Mobility    Supine-Sit [place] (Bed Mobility) moderate assist (50% patient effort); verbal cues  -SHIRA     Sit-Supine [place] (Bed Mobility) moderate assist (50% patient effort); verbal cues  -SHIRA     Row Name 22 1350          Transfers    Sit-Stand [place] (Transfers) maximum assist (25% patient effort); verbal cues  -SHIRA     Row Name 22 135           Activities of Daily Living    BADL Assessment/Intervention --  Patient donned slippers edge of bed  -KN           User Key  (r) = Recorded By, (t) = Taken By, (c) = Cosigned By    Initials Name Provider Type    Mina Valdez OT Occupational Therapist               Obj/Interventions     Row Name 01/13/22 1352          Balance    Balance Assessment sitting static balance; standing static balance  Patient max assist to stand at edge of bed due to 10 out of 10 pain  -KN     Balance Interventions standing; sitting; sit to stand; minimal challenge; occupation based/functional task  -KN           User Key  (r) = Recorded By, (t) = Taken By, (c) = Cosigned By    Initials Name Provider Type    Mina Valdez OT Occupational Therapist               Goals/Plan    No documentation.                Clinical Impression     Row Name 01/13/22 1358          Pain Scale: Numbers Pre/Post-Treatment    Pretreatment Pain Rating 10/10  -KN     Posttreatment Pain Rating 10/10  Patient reports minimal relief when repositioned from supine to sit.  -KN     Pain Location back  -KN     Pain Intervention(s) Repositioned; Therapeutic presence  -KN     Row Name 01/13/22 1355          Plan of Care Review    Progress no change  -KN     Outcome Summary Patient will continue with skilled occupational therapy services to increase independence with ADLs.  -KN     Row Name 01/13/22 1354          Vital Signs    O2 Delivery Pre Treatment room air  -KN     O2 Delivery Intra Treatment room air  -KN     O2 Delivery Post Treatment room air  -KN           User Key  (r) = Recorded By, (t) = Taken By, (c) = Cosigned By    Initials Name Provider Type    Mina Valdez OT Occupational Therapist               Outcome Measures     Row Name 01/13/22 1358          How much help from another is currently needed...    Putting on and taking off regular lower body clothing? 3  -KN     Bathing (including washing, rinsing, and drying) 3  -KN     Toileting (which  includes using toilet bed pan or urinal) 3  -KN     Putting on and taking off regular upper body clothing 3  -KN     Taking care of personal grooming (such as brushing teeth) 3  -KN     Eating meals 4  -KN     AM-PAC 6 Clicks Score (OT) 19  -KN     Row Name 01/13/22 1356          Functional Assessment    Outcome Measure Options AM-PAC 6 Clicks Daily Activity (OT); Optimal Instrument  -KN     Row Name 01/13/22 1356          Optimal Instrument    Bending/Stooping 2  -KN     Standing 4  -KN     Reaching 2  -KN           User Key  (r) = Recorded By, (t) = Taken By, (c) = Cosigned By    Initials Name Provider Type    Mina Valdez OT Occupational Therapist                Occupational Therapy Education                 Title: PT OT SLP Therapies (Done)     Topic: Occupational Therapy (Done)     Point: ADL training (Done)     Description:   Instruct learner(s) on proper safety adaptation and remediation techniques during self care or transfers.   Instruct in proper use of assistive devices.              Learning Progress Summary           Patient Acceptance, E, VU by SHIRA at 1/13/2022 1357    Comment: Repositioning  Transfer technique    Acceptance, E, VU by SHIRA at 1/12/2022 1331   Family Acceptance, E, VU by SHIRA at 1/12/2022 1331                   Point: Home exercise program (Done)     Description:   Instruct learner(s) on appropriate technique for monitoring, assisting and/or progressing therapeutic exercises/activities.              Learning Progress Summary           Patient Acceptance, E, VU by SHIRA at 1/13/2022 1357    Comment: Repositioning  Transfer technique    Acceptance, E, VU by SHIRA at 1/12/2022 1331   Family Acceptance, E, VU by SHIRA at 1/12/2022 1331                   Point: Precautions (Done)     Description:   Instruct learner(s) on prescribed precautions during self-care and functional transfers.              Learning Progress Summary           Patient Acceptance, E, VU by SHIRA at 1/13/2022 1357    Comment:  Repositioning  Transfer technique    Acceptance, E, VU by  at 1/12/2022 1331   Family Acceptance, E, VU by  at 1/12/2022 1331                   Point: Body mechanics (Done)     Description:   Instruct learner(s) on proper positioning and spine alignment during self-care, functional mobility activities and/or exercises.              Learning Progress Summary           Patient Acceptance, E, VU by  at 1/13/2022 1357    Comment: Repositioning  Transfer technique    Acceptance, E, VU by  at 1/12/2022 1331   Family Acceptance, E, VU by  at 1/12/2022 1331                               User Key     Initials Effective Dates Name Provider Type Discipline     12/20/21 -  Mina Serna OT Occupational Therapist OT              OT Recommendation and Plan  Planned Therapy Interventions (OT): activity tolerance training, BADL retraining, functional balance retraining, occupation/activity based interventions, strengthening exercise, patient/caregiver education/training  Therapy Frequency (OT): 5 times/wk  Plan of Care Review  Plan of Care Reviewed With: patient, daughter (Simultaneous filing. User may be unaware of other data.)  Progress: no change  Outcome Summary: Patient will continue with skilled occupational therapy services to increase independence with ADLs.     Time Calculation:    Time Calculation- OT     Row Name 01/13/22 1358             Time Calculation- OT    OT Received On 01/13/22  -KN      OT Goal Re-Cert Due Date 01/21/22  -KN              Timed Charges    33332 - OT Therapeutic Activity Minutes 10  -KN              Total Minutes    Timed Charges Total Minutes 10  -KN       Total Minutes 10  -KN            User Key  (r) = Recorded By, (t) = Taken By, (c) = Cosigned By    Initials Name Provider Type     Mina Serna OT Occupational Therapist              Therapy Charges for Today     Code Description Service Date Service Provider Modifiers Qty    94477371517  OT EVAL LOW COMPLEXITY 3 1/12/2022  Mina Serna, OT GO 1    [id number]  OT THERAPEUTIC ACT EA 15 MIN 1/13/2022 Mina Serna OT GO 1               Mina Serna OT  1/13/2022

## 2022-01-13 NOTE — PROGRESS NOTES
[facility]   Hospitalist Progress Note  Date: 2022  Patient Name: Ashtyn Jean Baptiste  : 1950  MRN: [id number]  Date of admission: 1/10/2022      Subjective   Subjective     Chief complaint: Shortness of breath     Summary:  71-year-old female with history of COPD, GERD without esophagitis, CHF not otherwise specified was hospitalized on 1/10/2022 with shortness of breath symptoms, left lower lobe pneumonia, acute hypoxemic respiratory failure secondary to left lower lobe pneumonia, ruled out for COVID-19, COPD with acute exacerbation, increased work of breathing and conversational dyspnea, started on supplemental oxygen, empiric antibiotics, preliminary sputum culture showing gram-positive bacilli and cocci, final result did not grow any particular bacteria.    Interval follow-up: Patient seen and examined this morning, no acute distress, no acute maturing events, patient still struggles to get out of bed, nursing staff have to engage her in participation, when her daughter is in the room, her strength worsens, she calls out quite a bit at night.  She is able to ambulate some, pre-CERT process still pending for rehab.  She remains on nasal cannula oxygen at 2 L, sats of been stable with a room to wean down.  She denies chest pain or palpitations, I reviewed her telemetry monitor for the past 24 hours, no acute major arrhythmic events, sinus rhythm in the 80s.    Review of systems:  All systems reviewed and negative except for significant cough and shortness of breath symptoms, hypoxemia 2 L, generalized weakness, right ankle pain.  Difficulty ambulating.  Depressed mood     Objective   Objective     Vitals:   Temp:  [97.3 °F (36.3 °C)-98.6 °F (37 °C)] 98.6 °F (37 °C)  Heart Rate:  [81-99] 97  Resp:  [16-22] 18  BP: (155-160)/(66-79) 155/68  Flow (L/min):  [3] 3  Physical Exam                         Constitutional: Awake, alert, no acute distress morbidly obese, on 2 L nasal cannula              Eyes:  Pupils equal, sclerae anicteric, no conjunctival injection              HENT: NCAT, mucous membranes moist, sinus congestion              Neck: Supple, full range of motion              Respiratory: Air entry bilaterally, coarse breath sounds throughout, expiratory wheeze              Cardiovascular: RRR, no murmurs, rubs, or gallops, palpable pedal pulses bilaterally              Gastrointestinal: Positive bowel sounds, soft, nontender, nondistended              Musculoskeletal: No bilateral ankle edema, no clubbing or cyanosis to extremities              Psychiatric: Appropriate affect, cooperative              Neurologic: Oriented x 3, strength symmetric in all extremities weakness throughout, Cranial Nerves grossly intact to confrontation, speech clear              Skin: No rashes     Result Review    Result Review:  I have personally reviewed the results from the time of this admission to 1/13/2022 18:59 EST and agree with these findings:  [x]  Laboratory   CBC    CBC 1/11/22 1/12/22 1/13/22   WBC 9.25 18.00 (A) 17.10 (A)   RBC 4.51 4.57 4.68   Hemoglobin 12.8 13.1 13.5   Hematocrit 40.0 41.0 41.4   MCV 88.7 89.7 88.5   MCH 28.4 28.7 28.8   MCHC 32.0 32.0 32.6   RDW 14.4 14.6 14.5   Platelets 304 329 344   (A) Abnormal value            BMP    BMP 1/11/22 1/12/22 1/13/22   BUN 32 (A) 37 (A) 48 (A)   Creatinine 1.13 (A) 1.12 (A) 1.39 (A)   Sodium 140 142 139   Potassium 4.0 4.4 4.2   Chloride 104 104 101   CO2 24.0 23.1 23.8   Calcium 9.6 9.5 9.5   (A) Abnormal value            LIVER FUNCTION TESTS:      Lab 01/13/22  0418 01/12/22  0452 01/11/22  0504 01/10/22  1059   TOTAL PROTEIN 7.9 7.3 7.6 7.5   ALBUMIN 4.40 4.10 4.20 3.90   GLOBULIN  --   --   --  3.6   ALT (SGPT) 25 13 12 11   AST (SGOT) 43* 22 13 16   BILIRUBIN 0.3 0.3 0.3 0.4   BILIRUBIN DIRECT <0.2 <0.2 <0.2  --    ALK PHOS 142* 128* 139* 129*       [x]  Microbiology   Blood Culture   Date Value Ref Range Status   01/10/2022 No growth at 3 days   Preliminary   01/10/2022 No growth at 3 days  Preliminary     No results found for: BCIDPCR, CXREFLEX, CSFCX, CULTURETIS  No results found for: CULTURES, HSVCX, URCX  No results found for: EYECULTURE, GCCX, HSVCULTURE, LABHSV  No results found for: LEGIONELLA, MRSACX, MUMPSCX, MYCOPLASCX  No results found for: NOCARDIACX, STOOLCX  No results found for: THROATCX, UNSTIMCULT, URINECX, CULTURE, VZVCULTUR  No results found for: VIRALCULTU, WOUNDCX    [x]  Radiology XR Ankle 3+ View Right    Result Date: 1/12/2022  PROCEDURE: XR ANKLE 3+ VW RIGHT  COMPARISON: None  INDICATIONS: RIGHT ANKLE PAIN FROM TWISTING INJURY  FINDINGS:  No fracture.  No dislocation.  Small plantar spur.       Small plantar spur.  No acute findings      MICHAEL LERNER MD       Electronically Signed and Approved By: MICHAEL LERNER MD on 1/12/2022 at 15:03             CT Chest Without Contrast Diagnostic    Result Date: 1/12/2022  PROCEDURE: CT CHEST WO CONTRAST DIAGNOSTIC  COMPARISON: None  INDICATIONS: Pneumonia, effusion or abscess suspected  TECHNIQUE: CT images were created without the administration of contrast material.   PROTOCOL:   Standard imaging protocol performed    RADIATION:   DLP: 552mGy*cm   Automated exposure control was utilized to minimize radiation dose.  FINDINGS:  Scattered areas of predominately subpleural interstitial prominence.  There is emphysema.  There is no dense consolidation.  No pleural fluid.  No adenopathy in the chest.  No acute findings in the included upper abdomen.       Scattered areas of predominately subpleural interstitial prominence may represent subpleural scarring or possibly interstitial lung disease.  Post infectious or inflammatory change could have this appearance.  No dense consolidation.     MICHAEL LERNER MD       Electronically Signed and Approved By: MICHAEL LERNER MD on 1/12/2022 at 8:07             XR Chest 1 View    Result Date: 1/10/2022  PROCEDURE: XR CHEST 1 VW  COMPARISON: None  INDICATIONS: SOB,  CHEST PAINS  FINDINGS:  Heart size is upper limits of normal.  Interstitial prominence in the mid to lower lung zones.  There is an area of more dense opacity in the lateral left lower lung zone.       Findings suspicious for pneumonia, particularly in the left lower lung zone.  Correlate with presentation.       MICHAEL LERNER MD       Electronically Signed and Approved By: MICHAEL LERNER MD on 1/10/2022 at 11:20               [x]  EKG/Telemetry   []  Cardiology/Vascular   []  Pathology  [x]  Old records  []  Other:    Assessment/Plan   Assessment / Plan     Assessment/Plan:  Assessment:  Left lower lobe pneumonia likely community-acquired pneumonia staph versus strep versus atypical  Acute hypoxemic respiratory failure secondary to pneumonia  COPD with acute exacerbation  Increased work of breathing  Morbid obesity  Leukocytosis  History of CHF unspecified diastolic or systolic  GERD without esophagitis  Physical weakness and debility  Difficulty ambulating  Right ankle pain secondary to misstep     Plan:  Labs and imaging reviewed  PT/OT consulted  Placement pending  Wean oxygen  Continue with IV ceftriaxone 1 g daily for 7 days total, transition to cefdinir  Out of bed to chair as much as she can tolerate  Refer to pulmonology as outpatient for repeat CT scan in 3 months  Telemetry monitoring continued  Continue with Ross James nebs twice daily  Continue with DuoNebs every 6 hours  Bronchopulmonary hygiene protocol  Bronchodilator protocol  Discontinue Solu-Medrol  Start prednisone 40 mg daily for 5 days  A.m. labs  Full code  VT prophylaxis Lovenox  Clinical course to dictate further management  Discussed with nurse at the bedside      DVT prophylaxis:  Medical DVT prophylaxis orders are present.    CODE STATUS:   Level Of Support Discussed With: Patient  Code Status (Patient has no pulse and is not breathing): CPR (Attempt to Resuscitate)  Medical Interventions (Patient has pulse or is breathing): Full  Support        Electronically signed by Tripp Arzate MD, 01/13/22, 6:59 PM EST.

## 2022-01-14 LAB
ALBUMIN SERPL-MCNC: 4.3 G/DL (ref 3.5–5.2)
ALP SERPL-CCNC: 123 U/L (ref 39–117)
ALT SERPL W P-5'-P-CCNC: 36 U/L (ref 1–33)
ANION GAP SERPL CALCULATED.3IONS-SCNC: 11.6 MMOL/L (ref 5–15)
AST SERPL-CCNC: 68 U/L (ref 1–32)
BASOPHILS # BLD AUTO: 0.01 10*3/MM3 (ref 0–0.2)
BASOPHILS NFR BLD AUTO: 0.1 % (ref 0–1.5)
BILIRUB CONJ SERPL-MCNC: <0.2 MG/DL (ref 0–0.3)
BILIRUB INDIRECT SERPL-MCNC: ABNORMAL MG/DL
BILIRUB SERPL-MCNC: 0.4 MG/DL (ref 0–1.2)
BUN SERPL-MCNC: 45 MG/DL (ref 8–23)
BUN/CREAT SERPL: 40.5 (ref 7–25)
CALCIUM SPEC-SCNC: 9.6 MG/DL (ref 8.6–10.5)
CHLORIDE SERPL-SCNC: 102 MMOL/L (ref 98–107)
CO2 SERPL-SCNC: 27.4 MMOL/L (ref 22–29)
CREAT SERPL-MCNC: 1.11 MG/DL (ref 0.57–1)
DEPRECATED RDW RBC AUTO: 47.9 FL (ref 37–54)
EOSINOPHIL # BLD AUTO: 0 10*3/MM3 (ref 0–0.4)
EOSINOPHIL NFR BLD AUTO: 0 % (ref 0.3–6.2)
ERYTHROCYTE [DISTWIDTH] IN BLOOD BY AUTOMATED COUNT: 14.6 % (ref 12.3–15.4)
GFR SERPL CREATININE-BSD FRML MDRD: 49 ML/MIN/1.73
GLUCOSE SERPL-MCNC: 156 MG/DL (ref 65–99)
HCT VFR BLD AUTO: 41.9 % (ref 34–46.6)
HGB BLD-MCNC: 13.4 G/DL (ref 12–15.9)
IMM GRANULOCYTES # BLD AUTO: 0.08 10*3/MM3 (ref 0–0.05)
IMM GRANULOCYTES NFR BLD AUTO: 0.5 % (ref 0–0.5)
LYMPHOCYTES # BLD AUTO: 1.12 10*3/MM3 (ref 0.7–3.1)
LYMPHOCYTES NFR BLD AUTO: 7.6 % (ref 19.6–45.3)
MAGNESIUM SERPL-MCNC: 2.3 MG/DL (ref 1.6–2.4)
MCH RBC QN AUTO: 28.6 PG (ref 26.6–33)
MCHC RBC AUTO-ENTMCNC: 32 G/DL (ref 31.5–35.7)
MCV RBC AUTO: 89.5 FL (ref 79–97)
MONOCYTES # BLD AUTO: 1.8 10*3/MM3 (ref 0.1–0.9)
MONOCYTES NFR BLD AUTO: 12.2 % (ref 5–12)
NEUTROPHILS NFR BLD AUTO: 11.77 10*3/MM3 (ref 1.7–7)
NEUTROPHILS NFR BLD AUTO: 79.6 % (ref 42.7–76)
NRBC BLD AUTO-RTO: 0 /100 WBC (ref 0–0.2)
PHOSPHATE SERPL-MCNC: 3.3 MG/DL (ref 2.5–4.5)
PLATELET # BLD AUTO: 295 10*3/MM3 (ref 140–450)
PMV BLD AUTO: 9.2 FL (ref 6–12)
POTASSIUM SERPL-SCNC: 3.9 MMOL/L (ref 3.5–5.2)
PROT SERPL-MCNC: 7.3 G/DL (ref 6–8.5)
RBC # BLD AUTO: 4.68 10*6/MM3 (ref 3.77–5.28)
SODIUM SERPL-SCNC: 141 MMOL/L (ref 136–145)
TROPONIN T SERPL-MCNC: <0.01 NG/ML (ref 0–0.03)
TROPONIN T SERPL-MCNC: <0.01 NG/ML (ref 0–0.03)
WBC NRBC COR # BLD: 14.78 10*3/MM3 (ref 3.4–10.8)

## 2022-01-14 PROCEDURE — 97530 THERAPEUTIC ACTIVITIES: CPT

## 2022-01-14 PROCEDURE — 25010000002 FUROSEMIDE PER 20 MG: Performed by: FAMILY MEDICINE

## 2022-01-14 PROCEDURE — 99232 SBSQ HOSP IP/OBS MODERATE 35: CPT | Performed by: FAMILY MEDICINE

## 2022-01-14 PROCEDURE — 80076 HEPATIC FUNCTION PANEL: CPT | Performed by: FAMILY MEDICINE

## 2022-01-14 PROCEDURE — 94799 UNLISTED PULMONARY SVC/PX: CPT

## 2022-01-14 PROCEDURE — 84100 ASSAY OF PHOSPHORUS: CPT | Performed by: FAMILY MEDICINE

## 2022-01-14 PROCEDURE — 84484 ASSAY OF TROPONIN QUANT: CPT | Performed by: FAMILY MEDICINE

## 2022-01-14 PROCEDURE — 25010000002 CEFTRIAXONE PER 250 MG: Performed by: FAMILY MEDICINE

## 2022-01-14 PROCEDURE — 25010000002 ENOXAPARIN PER 10 MG: Performed by: FAMILY MEDICINE

## 2022-01-14 PROCEDURE — 63710000001 PROMETHAZINE PER 12.5 MG: Performed by: HOSPITALIST

## 2022-01-14 PROCEDURE — 83735 ASSAY OF MAGNESIUM: CPT | Performed by: FAMILY MEDICINE

## 2022-01-14 PROCEDURE — 93005 ELECTROCARDIOGRAM TRACING: CPT | Performed by: FAMILY MEDICINE

## 2022-01-14 PROCEDURE — 93010 ELECTROCARDIOGRAM REPORT: CPT | Performed by: INTERNAL MEDICINE

## 2022-01-14 PROCEDURE — 80048 BASIC METABOLIC PNL TOTAL CA: CPT | Performed by: FAMILY MEDICINE

## 2022-01-14 PROCEDURE — 63710000001 PREDNISONE PER 1 MG: Performed by: FAMILY MEDICINE

## 2022-01-14 PROCEDURE — 85025 COMPLETE CBC W/AUTO DIFF WBC: CPT | Performed by: FAMILY MEDICINE

## 2022-01-14 RX ORDER — FUROSEMIDE 10 MG/ML
40 INJECTION INTRAMUSCULAR; INTRAVENOUS ONCE
Status: COMPLETED | OUTPATIENT
Start: 2022-01-14 | End: 2022-01-14

## 2022-01-14 RX ADMIN — BUDESONIDE 0.5 MG: 0.5 SUSPENSION RESPIRATORY (INHALATION) at 06:18

## 2022-01-14 RX ADMIN — GABAPENTIN 800 MG: 400 CAPSULE ORAL at 14:04

## 2022-01-14 RX ADMIN — ACETAMINOPHEN 650 MG: 325 TABLET ORAL at 23:53

## 2022-01-14 RX ADMIN — GABAPENTIN 800 MG: 400 CAPSULE ORAL at 05:05

## 2022-01-14 RX ADMIN — IPRATROPIUM BROMIDE AND ALBUTEROL SULFATE 3 ML: .5; 2.5 SOLUTION RESPIRATORY (INHALATION) at 06:18

## 2022-01-14 RX ADMIN — IPRATROPIUM BROMIDE AND ALBUTEROL SULFATE 3 ML: .5; 2.5 SOLUTION RESPIRATORY (INHALATION) at 11:42

## 2022-01-14 RX ADMIN — HYDROCODONE BITARTRATE AND ACETAMINOPHEN 1 TABLET: 10; 325 TABLET ORAL at 10:40

## 2022-01-14 RX ADMIN — CEFTRIAXONE SODIUM 1 G: 1 INJECTION, SOLUTION INTRAVENOUS at 10:41

## 2022-01-14 RX ADMIN — GABAPENTIN 800 MG: 400 CAPSULE ORAL at 21:27

## 2022-01-14 RX ADMIN — FUROSEMIDE 40 MG: 10 INJECTION, SOLUTION INTRAMUSCULAR; INTRAVENOUS at 18:34

## 2022-01-14 RX ADMIN — ACETAMINOPHEN 650 MG: 325 TABLET ORAL at 05:05

## 2022-01-14 RX ADMIN — HYDROCODONE BITARTRATE AND ACETAMINOPHEN 1 TABLET: 10; 325 TABLET ORAL at 03:27

## 2022-01-14 RX ADMIN — ATENOLOL 25 MG: 25 TABLET ORAL at 10:40

## 2022-01-14 RX ADMIN — SODIUM CHLORIDE, PRESERVATIVE FREE 10 ML: 5 INJECTION INTRAVENOUS at 21:30

## 2022-01-14 RX ADMIN — PREDNISONE 40 MG: 20 TABLET ORAL at 10:41

## 2022-01-14 RX ADMIN — ARFORMOTEROL TARTRATE 15 MCG: 15 SOLUTION RESPIRATORY (INHALATION) at 06:18

## 2022-01-14 RX ADMIN — ENOXAPARIN SODIUM 40 MG: 40 INJECTION SUBCUTANEOUS at 21:27

## 2022-01-14 RX ADMIN — ATORVASTATIN CALCIUM 40 MG: 40 TABLET, FILM COATED ORAL at 21:27

## 2022-01-14 RX ADMIN — FUROSEMIDE 40 MG: 40 TABLET ORAL at 10:40

## 2022-01-14 RX ADMIN — HYDROCODONE BITARTRATE AND ACETAMINOPHEN 1 TABLET: 10; 325 TABLET ORAL at 21:27

## 2022-01-14 RX ADMIN — ESCITALOPRAM OXALATE 20 MG: 10 TABLET ORAL at 10:40

## 2022-01-14 RX ADMIN — IPRATROPIUM BROMIDE AND ALBUTEROL SULFATE 3 ML: .5; 2.5 SOLUTION RESPIRATORY (INHALATION) at 00:50

## 2022-01-14 RX ADMIN — SODIUM CHLORIDE, PRESERVATIVE FREE 10 ML: 5 INJECTION INTRAVENOUS at 10:42

## 2022-01-14 RX ADMIN — Medication 5 MG: at 21:27

## 2022-01-14 RX ADMIN — PROMETHAZINE HYDROCHLORIDE 12.5 MG: 12.5 TABLET ORAL at 23:50

## 2022-01-14 RX ADMIN — ENOXAPARIN SODIUM 40 MG: 40 INJECTION SUBCUTANEOUS at 10:40

## 2022-01-14 NOTE — NURSING NOTE
Patient alert and oriented. Patient complains of pain to back. Pain meds given as ordered. And patient repositioned multiple times throughout shift. VSS. No signs and symptoms of distress noted. Patient and family requests geriatric bed to promote comfort. Patient transferred to new bed. Patient c/o chest pain to midchest that feels sharp. Pain 9/10. MJonathon ANTON notified. N.O for stat EKG and tropoinins. No further orders at this time. Patient reevaluated and no further chest pain noted. Bed in lowest position. Call light within reach. Will continue to monitor.

## 2022-01-14 NOTE — THERAPY TREATMENT NOTE
Acute Care - Physical Therapy Treatment Note   [place]     Patient Name: Ashtyn Jean Baptiste  : 1950  MRN: [id number]  Today's Date: 2022      Visit Dx:     ICD-10-CM ICD-9-CM   1. Acute respiratory failure with hypoxia (HCC)  J96.01 518.81   2. Pneumonia of left lower lobe due to infectious organism  J18.9 486   3. Acute exacerbation of chronic obstructive pulmonary disease (COPD) (HCC)  J44.1 491.21   4. Decreased activities of daily living (ADL)  Z78.9 V49.89   5. Difficulty walking  R26.2 719.7     Patient Active Problem List   Diagnosis   • Acute respiratory failure with hypoxia (HCC)     Past Medical History:   Diagnosis Date   • CHF (congestive heart failure) (HCC)    • COPD (chronic obstructive pulmonary disease) ([facility])    • GERD (gastroesophageal reflux disease)      Past Surgical History:   Procedure Laterality Date   • APPENDECTOMY     • HYSTERECTOMY       PT Assessment (last 12 hours)     PT Evaluation and Treatment     Row Name 22 1500          Physical Therapy Time and Intention    Document Type therapy note (daily note)  -AV     Mode of Treatment individual therapy; physical therapy  -AV     Row Name 22 1500          Bed Mobility    Bed Mobility supine-sit  -AV     Supine-Sit [place] (Bed Mobility) minimum assist (75% patient effort); verbal cues; 1 person assist  -AV     Row Name 22 1500          Transfers    Transfers sit-stand transfer; stand pivot/stand step transfer  -AV     Comment (Transfers) Once standing patient with bilateral knee buckling with step/pivot to recliner. Patient instructed to push through bilateral lower extremities to return to standing/squat position but was unable to. Therapist and nurse dependently completed pivot and placed patient on edge of recliner. Once seated in recliner patient requiring max encouragement for self repositiong and education on importance of getting out of bed to sit in recliner throughout day.  -AV     Sit-Stand  [place] (Transfers) minimum assist (75% patient effort); contact guard; 2 person assist  -AV     Row Name 01/14/22 1500          Sit-Stand Transfer    Assistive Device (Sit-Stand Transfers) --  HHA  -AV     Row Name 01/14/22 1500          Stand Pivot/Stand Step Transfer    Stand Pivot/Stand Step [place] (Transfers) dependent (less than 25% patient effort); 2 person assist  -AV     Row Name 01/14/22 1500          Safety Issues, Functional Mobility    Safety Issues Affecting Function (Mobility) ability to follow commands; awareness of need for assistance; insight into deficits/self-awareness; judgment; problem-solving  -AV     Impairments Affecting Function (Mobility) balance; endurance/activity tolerance; strength  -AV     Row Name 01/14/22 1500          Balance    Balance Assessment standing dynamic balance  -AV     Dynamic Standing Balance severe impairment; supported; standing  -AV     Row Name 01/14/22 1500          Progress Summary (PT)    Progress Toward Functional Goals (PT) progress toward functional goals is fair  -AV           User Key  (r) = Recorded By, (t) = Taken By, (c) = Cosigned By    Initials Name Provider Type    Magno Avila, PT Physical Therapist                Physical Therapy Education                 Title: PT OT SLP Therapies (Done)     Topic: Physical Therapy (Done)     Point: Mobility training (Done)     Learning Progress Summary           Patient Acceptance, E, VU by SHIRA at 1/13/2022 1357    Comment: Repositioning  Transfer technique    Acceptance, E, VU by SHIRA at 1/12/2022 1331    Acceptance, E,TB, VU,NR by BENTON at 1/12/2022 1328   Family Acceptance, E, VU by SHIRA at 1/12/2022 1331                   Point: Home exercise program (Done)     Learning Progress Summary           Patient Acceptance, E, VU by SHIRA at 1/13/2022 1357    Comment: Repositioning  Transfer technique    Acceptance, E, VU by SHIRA at 1/12/2022 1331    Acceptance, E,TB, VU,NR by BENTON at 1/12/2022 1328   Family  Acceptance, E, VU by KN at 1/12/2022 1331                   Point: Body mechanics (Done)     Learning Progress Summary           Patient Acceptance, E, VU by KN at 1/13/2022 1357    Comment: Repositioning  Transfer technique    Acceptance, E, VU by KN at 1/12/2022 1331    Acceptance, E,TB, VU,NR by AV at 1/12/2022 1328   Family Acceptance, E, VU by KN at 1/12/2022 1331                   Point: Precautions (Done)     Learning Progress Summary           Patient Acceptance, E, VU by KN at 1/13/2022 1357    Comment: Repositioning  Transfer technique    Acceptance, E, VU by KN at 1/12/2022 1331    Acceptance, E,TB, VU,NR by AV at 1/12/2022 1328   Family Acceptance, E, VU by KN at 1/12/2022 1331                               User Key     Initials Effective Dates Name Provider Type Discipline     06/11/21 -  Magno Amador, PT Physical Therapist PT     12/20/21 -  Mina Serna OT Occupational Therapist OT              PT Recommendation and Plan  Anticipated Discharge Disposition (PT): inpatient rehabilitation facility, sub acute care setting  Planned Therapy Interventions (PT): balance training, bed mobility training, gait training, strengthening, transfer training, neuromuscular re-education  Therapy Frequency (PT): daily  Progress Summary (PT)  Progress Toward Functional Goals (PT): progress toward functional goals is fair  Plan of Care Reviewed With: patient  Progress: no change  Outcome Summary: Patient presents with deficits in balance, strength, transfers, and ambulation. Patient will benefit from skilled PT services to address these mobility deficits and decrease risk of falls. Recommend rolling walker and rehab placement following hospital discharge.   Outcome Measures     Row Name 01/14/22 1500 01/12/22 1300          How much help from another person do you currently need...    Turning from your back to your side while in flat bed without using bedrails? 4  -AV 4  -AV     Moving from lying on back to  sitting on the side of a flat bed without bedrails? 3  -AV 3  -AV     Moving to and from a bed to a chair (including a wheelchair)? 2  -AV 2  -AV     Standing up from a chair using your arms (e.g., wheelchair, bedside chair)? 2  -AV 2  -AV     Climbing 3-5 steps with a railing? 1  -AV 1  -AV     To walk in hospital room? 1  -AV 2  -AV     AM-PAC 6 Clicks Score (PT) 13  -AV 14  -AV            Functional Assessment    Outcome Measure Options AM-PAC 6 Clicks Basic Mobility (PT)  -AV AM-PAC 6 Clicks Basic Mobility (PT)  -AV           User Key  (r) = Recorded By, (t) = Taken By, (c) = Cosigned By    Initials Name Provider Type    Magno Avila, EWELINA Physical Therapist                 Time Calculation:    PT Charges     Row Name 01/14/22 1537             Time Calculation    PT Received On 01/14/22  -AV      PT Goal Re-Cert Due Date 01/21/22  -AV              Timed Charges    59041 - PT Therapeutic Activity Minutes 15  -AV              Total Minutes    Timed Charges Total Minutes 15  -AV       Total Minutes 15  -AV            User Key  (r) = Recorded By, (t) = Taken By, (c) = Cosigned By    Initials Name Provider Type    Magno Avila, EWELINA Physical Therapist              Therapy Charges for Today     Code Description Service Date Service Provider Modifiers Qty    44040519818  PT THERAPEUTIC ACT EA 15 MIN 1/14/2022 Magno Amador, PT GP 1          PT G-Codes  Outcome Measure Options: AM-PAC 6 Clicks Basic Mobility (PT)  AM-PAC 6 Clicks Score (PT): 13  AM-PAC 6 Clicks Score (OT): 19    Magno Amador PT  1/14/2022

## 2022-01-14 NOTE — PROGRESS NOTES
[facility]   Hospitalist Progress Note  Date: 2022  Patient Name: Ashtyn Jean Baptiste  : 1950  MRN: [id number]  Date of admission: 1/10/2022      Subjective   Subjective     Chief complaint: Shortness of breath     Summary:  71-year-old female with history of COPD, GERD without esophagitis, CHF not otherwise specified was hospitalized on 1/10/2022 with shortness of breath symptoms, left lower lobe pneumonia, acute hypoxemic respiratory failure secondary to left lower lobe pneumonia, ruled out for COVID-19, COPD with acute exacerbation, increased work of breathing and conversational dyspnea, started on supplemental oxygen, empiric antibiotics, preliminary sputum culture showing gram-positive bacilli and cocci, final result did not grow any particular bacteria.     Interval follow-up: Patient seen and examined this morning, no acute distress, no acute major overnight events, patient continues to complain of back pain due to the bedding, notes mattress is uncomfortable, remains on nasal cannula oxygen this morning with sats greater than 90% with room to wean down, nursing staff at the bedside, plans to work on weaning down her oxygen, I reviewed her telemetry monitoring, she has had no acute staved arrhythmic events other than few PVCs, baseline sinus rhythm in the 70s.  She remains weak and debilitated awaiting rehab placement.  Denies fevers, chills, sweats.  Creatinine down 1.11.  White blood cell count down to 14,000, she did have chest pain earlier today, sharp, 9 out of 10, EKG and troponins pursued, both nondiagnostic for cardiac event.    Review of systems:  All systems reviewed and negative except for cough, shortness of breath, generalized fatigue, debility, back pain, reflux symptoms, anxiety, dyspnea on exertion.    Objective   Objective     Vitals:   Temp:  [97.2 °F (36.2 °C)-98.4 °F (36.9 °C)] 98.4 °F (36.9 °C)  Heart Rate:  [80-98] 80  Resp:  [16-20] 20  BP: (148-180)/() 180/100  Flow  (L/min):  [2-3] 2  Physical Exam    Constitutional: Awake, alert, no acute distress morbidly obese, on 2 L nasal cannula laying on her side              Eyes: Pupils equal, sclerae anicteric, no conjunctival injection              HENT: NCAT, mucous membranes moist, sinus congestion              Neck: Supple, full range of motion              Respiratory: Air entry bilaterally, coarse breath sounds throughout, expiratory wheeze              Cardiovascular: RRR, no murmurs, rubs, or gallops, palpable pedal pulses bilaterally              Gastrointestinal: Positive bowel sounds, soft, nontender, nondistended              Musculoskeletal: No bilateral ankle edema, no clubbing or cyanosis to extremities              Psychiatric: Appropriate affect, cooperative              Neurologic: Oriented x 3, strength symmetric in all extremities weakness throughout, Cranial Nerves grossly intact to confrontation, speech clear              Skin: No rashes    Result Review    Result Review:  I have personally reviewed the results from the time of this admission to 1/14/2022 18:11 EST and agree with these findings:  [x]  Laboratory   CBC    CBC 1/12/22 1/13/22 1/14/22   WBC 18.00 (A) 17.10 (A) 14.78 (A)   RBC 4.57 4.68 4.68   Hemoglobin 13.1 13.5 13.4   Hematocrit 41.0 41.4 41.9   MCV 89.7 88.5 89.5   MCH 28.7 28.8 28.6   MCHC 32.0 32.6 32.0   RDW 14.6 14.5 14.6   Platelets 329 344 295   (A) Abnormal value            BMP    BMP 1/12/22 1/13/22 1/14/22   BUN 37 (A) 48 (A) 45 (A)   Creatinine 1.12 (A) 1.39 (A) 1.11 (A)   Sodium 142 139 141   Potassium 4.4 4.2 3.9   Chloride 104 101 102   CO2 23.1 23.8 27.4   Calcium 9.5 9.5 9.6   (A) Abnormal value            LIVER FUNCTION TESTS:      Lab 01/14/22  0450 01/13/22  0418 01/12/22  0452 01/11/22  0504 01/10/22  1059   TOTAL PROTEIN 7.3 7.9 7.3 7.6 7.5   ALBUMIN 4.30 4.40 4.10 4.20 3.90   GLOBULIN  --   --   --   --  3.6   ALT (SGPT) 36* 25 13 12 11   AST (SGOT) 68* 43* 22 13 16    BILIRUBIN 0.4 0.3 0.3 0.3 0.4   BILIRUBIN DIRECT <0.2 <0.2 <0.2 <0.2  --    ALK PHOS 123* 142* 128* 139* 129*       [x]  Microbiology   Blood Culture   Date Value Ref Range Status   01/10/2022 No growth at 4 days  Preliminary   01/10/2022 No growth at 4 days  Preliminary     No results found for: BCIDPCR, CXREFLEX, CSFCX, CULTURETIS  No results found for: CULTURES, HSVCX, URCX  No results found for: EYECULTURE, GCCX, HSVCULTURE, LABHSV  No results found for: LEGIONELLA, MRSACX, MUMPSCX, MYCOPLASCX  No results found for: NOCARDIACX, STOOLCX  No results found for: THROATCX, UNSTIMCULT, URINECX, CULTURE, VZVCULTUR  No results found for: VIRALCULTU, WOUNDCX    [x]  Radiology XR Ankle 3+ View Right    Result Date: 1/12/2022  PROCEDURE: XR ANKLE 3+ VW RIGHT  COMPARISON: None  INDICATIONS: RIGHT ANKLE PAIN FROM TWISTING INJURY  FINDINGS:  No fracture.  No dislocation.  Small plantar spur.       Small plantar spur.  No acute findings      MICHAEL LERNER MD       Electronically Signed and Approved By: MICHAEL LERNER MD on 1/12/2022 at 15:03             CT Chest Without Contrast Diagnostic    Result Date: 1/12/2022  PROCEDURE: CT CHEST WO CONTRAST DIAGNOSTIC  COMPARISON: None  INDICATIONS: Pneumonia, effusion or abscess suspected  TECHNIQUE: CT images were created without the administration of contrast material.   PROTOCOL:   Standard imaging protocol performed    RADIATION:   DLP: 552mGy*cm   Automated exposure control was utilized to minimize radiation dose.  FINDINGS:  Scattered areas of predominately subpleural interstitial prominence.  There is emphysema.  There is no dense consolidation.  No pleural fluid.  No adenopathy in the chest.  No acute findings in the included upper abdomen.       Scattered areas of predominately subpleural interstitial prominence may represent subpleural scarring or possibly interstitial lung disease.  Post infectious or inflammatory change could have this appearance.  No dense consolidation.      MICHAEL LERNER MD       Electronically Signed and Approved By: MICHAEL LERNER MD on 1/12/2022 at 8:07             XR Chest 1 View    Result Date: 1/10/2022  PROCEDURE: XR CHEST 1 VW  COMPARISON: None  INDICATIONS: SOB, CHEST PAINS  FINDINGS:  Heart size is upper limits of normal.  Interstitial prominence in the mid to lower lung zones.  There is an area of more dense opacity in the lateral left lower lung zone.       Findings suspicious for pneumonia, particularly in the left lower lung zone.  Correlate with presentation.       MICHAEL LERNER MD       Electronically Signed and Approved By: MICHAEL LERNER MD on 1/10/2022 at 11:20               [x]  EKG/Telemetry   []  Cardiology/Vascular   []  Pathology  [x]  Old records  []  Other:    Assessment/Plan   Assessment / Plan     Assessment/Plan:  Assessment:  Left lower lobe pneumonia likely community-acquired pneumonia staph versus strep versus atypical  Acute hypoxemic respiratory failure secondary to pneumonia  COPD with acute exacerbation  Increased work of breathing  Morbid obesity  Leukocytosis  History of CHF unspecified diastolic or systolic  GERD without esophagitis  Physical weakness and debility  Difficulty ambulating  Right ankle pain secondary to misstep     Plan:  Labs and imaging reviewed  Lasix 40 mg x 1 dose IV today in addition to her p.o. dose this morning  Continue efforts to wean oxygen  Check D-dimer  PT/OT consulted  Placement pending  Wean oxygen per her tolerance  Continue with IV ceftriaxone 1 g daily for 7 days total, transition to cefdinir on discharge  Out of bed to chair as much as she can tolerate  Refer to pulmonology as outpatient for repeat CT scan in 3 months  Telemetry monitoring continued  Continue with Ross James nebs twice daily  Continue with Johnny every 6 hours  Bronchopulmonary hygiene protocol  Bronchodilator protocol  Start prednisone 40 mg daily, given her slow improvement, will taper slowly over the next several  weeks  A.m. labs  Full code  VTE prophylaxis Lovenox  Clinical course to dictate further management  Discussed with nurse at the bedside    DVT prophylaxis:  Medical DVT prophylaxis orders are present.    CODE STATUS:   Level Of Support Discussed With: Patient  Code Status (Patient has no pulse and is not breathing): CPR (Attempt to Resuscitate)  Medical Interventions (Patient has pulse or is breathing): Full Support        Electronically signed by Tripp Arzate MD, 01/14/22, 6:11 PM EST.

## 2022-01-15 ENCOUNTER — APPOINTMENT (OUTPATIENT)
Dept: GENERAL RADIOLOGY | Facility: HOSPITAL | Age: 72
End: 2022-01-15

## 2022-01-15 LAB
ALBUMIN SERPL-MCNC: 4.4 G/DL (ref 3.5–5.2)
ALP SERPL-CCNC: 128 U/L (ref 39–117)
ALT SERPL W P-5'-P-CCNC: 49 U/L (ref 1–33)
ANION GAP SERPL CALCULATED.3IONS-SCNC: 15.8 MMOL/L (ref 5–15)
AST SERPL-CCNC: 67 U/L (ref 1–32)
BACTERIA SPEC AEROBE CULT: NORMAL
BACTERIA SPEC AEROBE CULT: NORMAL
BASOPHILS # BLD AUTO: 0.02 10*3/MM3 (ref 0–0.2)
BASOPHILS NFR BLD AUTO: 0.1 % (ref 0–1.5)
BILIRUB CONJ SERPL-MCNC: <0.2 MG/DL (ref 0–0.3)
BILIRUB INDIRECT SERPL-MCNC: ABNORMAL MG/DL
BILIRUB SERPL-MCNC: 0.6 MG/DL (ref 0–1.2)
BUN SERPL-MCNC: 46 MG/DL (ref 8–23)
BUN/CREAT SERPL: 39 (ref 7–25)
CALCIUM SPEC-SCNC: 9.5 MG/DL (ref 8.6–10.5)
CHLORIDE SERPL-SCNC: 95 MMOL/L (ref 98–107)
CO2 SERPL-SCNC: 28.2 MMOL/L (ref 22–29)
CREAT SERPL-MCNC: 1.18 MG/DL (ref 0.57–1)
D DIMER PPP FEU-MCNC: 0.63 MG/L (FEU) (ref 0–0.59)
DEPRECATED RDW RBC AUTO: 46.3 FL (ref 37–54)
EOSINOPHIL # BLD AUTO: 0.01 10*3/MM3 (ref 0–0.4)
EOSINOPHIL NFR BLD AUTO: 0.1 % (ref 0.3–6.2)
ERYTHROCYTE [DISTWIDTH] IN BLOOD BY AUTOMATED COUNT: 14.5 % (ref 12.3–15.4)
GFR SERPL CREATININE-BSD FRML MDRD: 45 ML/MIN/1.73
GLUCOSE SERPL-MCNC: 149 MG/DL (ref 65–99)
HCT VFR BLD AUTO: 43.8 % (ref 34–46.6)
HGB BLD-MCNC: 14.2 G/DL (ref 12–15.9)
IMM GRANULOCYTES # BLD AUTO: 0.13 10*3/MM3 (ref 0–0.05)
IMM GRANULOCYTES NFR BLD AUTO: 0.9 % (ref 0–0.5)
LYMPHOCYTES # BLD AUTO: 1.25 10*3/MM3 (ref 0.7–3.1)
LYMPHOCYTES NFR BLD AUTO: 9.1 % (ref 19.6–45.3)
MAGNESIUM SERPL-MCNC: 2.4 MG/DL (ref 1.6–2.4)
MCH RBC QN AUTO: 28.7 PG (ref 26.6–33)
MCHC RBC AUTO-ENTMCNC: 32.4 G/DL (ref 31.5–35.7)
MCV RBC AUTO: 88.5 FL (ref 79–97)
MONOCYTES # BLD AUTO: 1.38 10*3/MM3 (ref 0.1–0.9)
MONOCYTES NFR BLD AUTO: 10.1 % (ref 5–12)
NEUTROPHILS NFR BLD AUTO: 10.93 10*3/MM3 (ref 1.7–7)
NEUTROPHILS NFR BLD AUTO: 79.7 % (ref 42.7–76)
NRBC BLD AUTO-RTO: 0 /100 WBC (ref 0–0.2)
PHOSPHATE SERPL-MCNC: 3.4 MG/DL (ref 2.5–4.5)
PLATELET # BLD AUTO: 306 10*3/MM3 (ref 140–450)
PMV BLD AUTO: 9.5 FL (ref 6–12)
POTASSIUM SERPL-SCNC: 3.7 MMOL/L (ref 3.5–5.2)
PROT SERPL-MCNC: 7.7 G/DL (ref 6–8.5)
QT INTERVAL: 362 MS
QT INTERVAL: 376 MS
RBC # BLD AUTO: 4.95 10*6/MM3 (ref 3.77–5.28)
SODIUM SERPL-SCNC: 139 MMOL/L (ref 136–145)
WBC NRBC COR # BLD: 13.72 10*3/MM3 (ref 3.4–10.8)

## 2022-01-15 PROCEDURE — 80048 BASIC METABOLIC PNL TOTAL CA: CPT | Performed by: FAMILY MEDICINE

## 2022-01-15 PROCEDURE — 80076 HEPATIC FUNCTION PANEL: CPT | Performed by: FAMILY MEDICINE

## 2022-01-15 PROCEDURE — 63710000001 PREDNISONE PER 1 MG: Performed by: FAMILY MEDICINE

## 2022-01-15 PROCEDURE — 84100 ASSAY OF PHOSPHORUS: CPT | Performed by: FAMILY MEDICINE

## 2022-01-15 PROCEDURE — 25010000002 ENOXAPARIN PER 10 MG: Performed by: FAMILY MEDICINE

## 2022-01-15 PROCEDURE — 85379 FIBRIN DEGRADATION QUANT: CPT | Performed by: FAMILY MEDICINE

## 2022-01-15 PROCEDURE — 99232 SBSQ HOSP IP/OBS MODERATE 35: CPT | Performed by: FAMILY MEDICINE

## 2022-01-15 PROCEDURE — 72114 X-RAY EXAM L-S SPINE BENDING: CPT

## 2022-01-15 PROCEDURE — 94799 UNLISTED PULMONARY SVC/PX: CPT

## 2022-01-15 PROCEDURE — 25010000002 PROCHLORPERAZINE 10 MG/2ML SOLUTION: Performed by: HOSPITALIST

## 2022-01-15 PROCEDURE — 25010000002 CEFTRIAXONE PER 250 MG: Performed by: FAMILY MEDICINE

## 2022-01-15 PROCEDURE — 83735 ASSAY OF MAGNESIUM: CPT | Performed by: FAMILY MEDICINE

## 2022-01-15 PROCEDURE — 85025 COMPLETE CBC W/AUTO DIFF WBC: CPT | Performed by: FAMILY MEDICINE

## 2022-01-15 PROCEDURE — 25010000002 ONDANSETRON PER 1 MG: Performed by: FAMILY MEDICINE

## 2022-01-15 RX ORDER — HYDROXYZINE HYDROCHLORIDE 25 MG/1
25 TABLET, FILM COATED ORAL 3 TIMES DAILY PRN
Status: DISCONTINUED | OUTPATIENT
Start: 2022-01-15 | End: 2022-01-18 | Stop reason: HOSPADM

## 2022-01-15 RX ADMIN — SENNOSIDES AND DOCUSATE SODIUM 2 TABLET: 50; 8.6 TABLET ORAL at 20:54

## 2022-01-15 RX ADMIN — GABAPENTIN 800 MG: 400 CAPSULE ORAL at 05:03

## 2022-01-15 RX ADMIN — HYDROCODONE BITARTRATE AND ACETAMINOPHEN 1 TABLET: 5; 325 TABLET ORAL at 13:31

## 2022-01-15 RX ADMIN — FUROSEMIDE 40 MG: 40 TABLET ORAL at 07:45

## 2022-01-15 RX ADMIN — GABAPENTIN 800 MG: 400 CAPSULE ORAL at 13:30

## 2022-01-15 RX ADMIN — GABAPENTIN 800 MG: 400 CAPSULE ORAL at 20:54

## 2022-01-15 RX ADMIN — IPRATROPIUM BROMIDE AND ALBUTEROL SULFATE 3 ML: .5; 2.5 SOLUTION RESPIRATORY (INHALATION) at 06:34

## 2022-01-15 RX ADMIN — SODIUM CHLORIDE, PRESERVATIVE FREE 10 ML: 5 INJECTION INTRAVENOUS at 20:55

## 2022-01-15 RX ADMIN — CEFTRIAXONE SODIUM 1 G: 1 INJECTION, SOLUTION INTRAVENOUS at 07:44

## 2022-01-15 RX ADMIN — BUDESONIDE 0.5 MG: 0.5 SUSPENSION RESPIRATORY (INHALATION) at 06:34

## 2022-01-15 RX ADMIN — HYDROCODONE BITARTRATE AND ACETAMINOPHEN 1 TABLET: 10; 325 TABLET ORAL at 03:03

## 2022-01-15 RX ADMIN — ONDANSETRON 4 MG: 2 INJECTION INTRAMUSCULAR; INTRAVENOUS at 23:36

## 2022-01-15 RX ADMIN — ARFORMOTEROL TARTRATE 15 MCG: 15 SOLUTION RESPIRATORY (INHALATION) at 06:34

## 2022-01-15 RX ADMIN — ENOXAPARIN SODIUM 40 MG: 40 INJECTION SUBCUTANEOUS at 20:54

## 2022-01-15 RX ADMIN — ATENOLOL 25 MG: 25 TABLET ORAL at 07:44

## 2022-01-15 RX ADMIN — ESCITALOPRAM OXALATE 20 MG: 10 TABLET ORAL at 07:45

## 2022-01-15 RX ADMIN — Medication 5 MG: at 20:54

## 2022-01-15 RX ADMIN — ATORVASTATIN CALCIUM 40 MG: 40 TABLET, FILM COATED ORAL at 20:54

## 2022-01-15 RX ADMIN — PROCHLORPERAZINE EDISYLATE 5 MG: 5 INJECTION INTRAMUSCULAR; INTRAVENOUS at 03:03

## 2022-01-15 RX ADMIN — PREDNISONE 40 MG: 20 TABLET ORAL at 07:44

## 2022-01-15 RX ADMIN — IPRATROPIUM BROMIDE AND ALBUTEROL SULFATE 3 ML: .5; 2.5 SOLUTION RESPIRATORY (INHALATION) at 12:33

## 2022-01-15 RX ADMIN — ENOXAPARIN SODIUM 40 MG: 40 INJECTION SUBCUTANEOUS at 07:45

## 2022-01-15 RX ADMIN — HYDROCODONE BITARTRATE AND ACETAMINOPHEN 1 TABLET: 10; 325 TABLET ORAL at 23:35

## 2022-01-15 RX ADMIN — SODIUM CHLORIDE, PRESERVATIVE FREE 10 ML: 5 INJECTION INTRAVENOUS at 07:46

## 2022-01-15 RX ADMIN — HYDROXYZINE HYDROCHLORIDE 25 MG: 25 TABLET, FILM COATED ORAL at 13:31

## 2022-01-15 RX ADMIN — HYDROCODONE BITARTRATE AND ACETAMINOPHEN 1 TABLET: 5; 325 TABLET ORAL at 07:44

## 2022-01-15 NOTE — PROGRESS NOTES
[facility]   Hospitalist Progress Note  Date: 1/15/2022  Patient Name: Ashtyn Jean Baptiste  : 1950  MRN: [id number]  Date of admission: 1/10/2022      Subjective   Subjective     Chief complaint: Shortness of breath     Summary:  71-year-old female with history of COPD, GERD without esophagitis, CHF not otherwise specified was hospitalized on 1/10/2022 with shortness of breath symptoms, left lower lobe pneumonia, acute hypoxemic respiratory failure secondary to left lower lobe pneumonia, ruled out for COVID-19, COPD with acute exacerbation, increased work of breathing and conversational dyspnea, started on supplemental oxygen, empiric antibiotics, preliminary sputum culture showing gram-positive bacilli and cocci, final result did not grow any particular bacteria.     Interval follow-up: Patient seen and examined this morning, no acute distress, no acute major overnight events, before entering the room patient seen sleeping comfortably, without agitation, upon entering the room the patient woke up and started complaining immediately of back pain, 10 out of 10, nonradiating, mostly on her right lower lumbar area, she notes that she feels there is something going on in her mid back area, lumbar spine x-ray was evaluated for which shows anterior angulation of the coccyx which is well below the area she is complaining, she has scoliosis and degenerative changes of her lumbar spine, she had anxiety overnight with chest pain symptoms, EKG and troponins evaluated which showed no ischemia.  Nursing staff note that she is very anxious throughout today calling out several times, medications added to help with alleviate some of her anxiety.  She remains on 2 L nasal cannula oxygen with sats improving, weaned down from 3 L, creatinine 1.18, BUN 46, white blood cell count at 13,000.     Review of systems:  All systems reviewed and negative except for back pain, cough, shortness of breath, dyspnea on exertion, increasing  anxiety    Objective   Objective     Vitals:   Temp:  [97.3 °F (36.3 °C)-98 °F (36.7 °C)] 97.3 °F (36.3 °C)  Heart Rate:  [72-88] 77  Resp:  [16-24] 20  BP: (161-174)/(64-95) 169/64  Flow (L/min):  [2] 2  Physical Exam    Constitutional: Awake, alert, no acute distress morbidly obese, on 2 L nasal cannula laying on her side              Eyes: Pupils equal, sclerae anicteric, no conjunctival injection              HENT: NCAT, mucous membranes moist, sinus congestion              Neck: Supple, full range of motion              Respiratory: Air entry bilaterally, coarse breath sounds throughout, expiratory wheeze              Cardiovascular: RRR, no murmurs, rubs, or gallops, palpable pedal pulses bilaterally              Gastrointestinal: Positive bowel sounds, soft, nontender, nondistended              Musculoskeletal: No bilateral ankle edema, no clubbing or cyanosis to extremities, palpated spine no paraspinal tenderness, no muscle knots, no bony tenderness              Psychiatric: Anxious mood              Neurologic: Oriented x 3, strength symmetric in all extremities weakness throughout, Cranial Nerves grossly intact to confrontation, speech clear              Skin: No rashes        Result Review    Result Review:  I have personally reviewed the results from the time of this admission to 1/15/2022 18:30 EST and agree with these findings:  [x]  Laboratory   CBC    CBC 1/13/22 1/14/22 1/15/22   WBC 17.10 (A) 14.78 (A) 13.72 (A)   RBC 4.68 4.68 4.95   Hemoglobin 13.5 13.4 14.2   Hematocrit 41.4 41.9 43.8   MCV 88.5 89.5 88.5   MCH 28.8 28.6 28.7   MCHC 32.6 32.0 32.4   RDW 14.5 14.6 14.5   Platelets 344 295 306   (A) Abnormal value            BMP    BMP 1/13/22 1/14/22 1/15/22   BUN 48 (A) 45 (A) 46 (A)   Creatinine 1.39 (A) 1.11 (A) 1.18 (A)   Sodium 139 141 139   Potassium 4.2 3.9 3.7   Chloride 101 102 95 (A)   CO2 23.8 27.4 28.2   Calcium 9.5 9.6 9.5   (A) Abnormal value            LIVER FUNCTION TESTS:       Lab 01/15/22  1315 01/14/22  0450 01/13/22  0418 01/12/22  0452 01/11/22  0504 01/10/22  1059 01/10/22  1059   TOTAL PROTEIN 7.7 7.3 7.9 7.3 7.6   < > 7.5   ALBUMIN 4.40 4.30 4.40 4.10 4.20   < > 3.90   GLOBULIN  --   --   --   --   --   --  3.6   ALT (SGPT) 49* 36* 25 13 12   < > 11   AST (SGOT) 67* 68* 43* 22 13   < > 16   BILIRUBIN 0.6 0.4 0.3 0.3 0.3   < > 0.4   BILIRUBIN DIRECT <0.2 <0.2 <0.2 <0.2 <0.2  --   --    ALK PHOS 128* 123* 142* 128* 139*   < > 129*    < > = values in this interval not displayed.       [x]  Microbiology   Blood Culture   Date Value Ref Range Status   01/10/2022 No growth at 5 days  Final   01/10/2022 No growth at 5 days  Final     No results found for: BCIDPCR, CXREFLEX, CSFCX, CULTURETIS  No results found for: CULTURES, HSVCX, URCX  No results found for: EYECULTURE, GCCX, HSVCULTURE, LABHSV  No results found for: LEGIONELLA, MRSACX, MUMPSCX, MYCOPLASCX  No results found for: NOCARDIACX, STOOLCX  No results found for: THROATCX, UNSTIMCULT, URINECX, CULTURE, VZVCULTUR  No results found for: VIRALCULTU, WOUNDCX    [x]  Radiology XR Spine Lumbar Complete With Flex & Ext    Result Date: 1/15/2022  PROCEDURE: XR SPINE LUMBAR COMPLETE W FLEX EXT  COMPARISON: None  INDICATIONS: lower back pain  FINDINGS:   Levoscoliosis is present.  Disc degeneration and facet OA are present throughout the lumbar spine.  There is advanced disc degeneration at L3-L4.  No evidence of acute fracture or subluxation in the lumbar spine.  There is an IVC filter in place.  There is anterior angulation of the coccyx.  IMPRESSION: 1. Anterior angulation of the coccyx may be chronic in nature.  An acute fracture is possible if the patient has pain in this region.  2. Scoliosis and degenerative change in the lumbar spine.   DEVANTE ZAVALA MD       Electronically Signed and Approved By: DEVANTE ZAVALA MD on 1/15/2022 at 12:12             XR Ankle 3+ View Right    Result Date: 1/12/2022  PROCEDURE: XR ANKLE 3+ VW  RIGHT  COMPARISON: None  INDICATIONS: RIGHT ANKLE PAIN FROM TWISTING INJURY  FINDINGS:  No fracture.  No dislocation.  Small plantar spur.       Small plantar spur.  No acute findings      MICHAEL LERNER MD       Electronically Signed and Approved By: MICHAEL LERNER MD on 1/12/2022 at 15:03             CT Chest Without Contrast Diagnostic    Result Date: 1/12/2022  PROCEDURE: CT CHEST WO CONTRAST DIAGNOSTIC  COMPARISON: None  INDICATIONS: Pneumonia, effusion or abscess suspected  TECHNIQUE: CT images were created without the administration of contrast material.   PROTOCOL:   Standard imaging protocol performed    RADIATION:   DLP: 552mGy*cm   Automated exposure control was utilized to minimize radiation dose.  FINDINGS:  Scattered areas of predominately subpleural interstitial prominence.  There is emphysema.  There is no dense consolidation.  No pleural fluid.  No adenopathy in the chest.  No acute findings in the included upper abdomen.       Scattered areas of predominately subpleural interstitial prominence may represent subpleural scarring or possibly interstitial lung disease.  Post infectious or inflammatory change could have this appearance.  No dense consolidation.     MICHAEL LERNER MD       Electronically Signed and Approved By: MICHAEL LERNER MD on 1/12/2022 at 8:07             XR Chest 1 View    Result Date: 1/10/2022  PROCEDURE: XR CHEST 1 VW  COMPARISON: None  INDICATIONS: SOB, CHEST PAINS  FINDINGS:  Heart size is upper limits of normal.  Interstitial prominence in the mid to lower lung zones.  There is an area of more dense opacity in the lateral left lower lung zone.       Findings suspicious for pneumonia, particularly in the left lower lung zone.  Correlate with presentation.       MICHAEL LERNER MD       Electronically Signed and Approved By: MICHAEL LERNER MD on 1/10/2022 at 11:20               [x]  EKG/Telemetry   []  Cardiology/Vascular   []  Pathology  [x]  Old records  []  Other:    Assessment/Plan    Assessment / Plan     Assessment/Plan:  Assessment:  Left lower lobe pneumonia likely community-acquired pneumonia staph versus strep versus atypical  Acute hypoxemic respiratory failure secondary to pneumonia  COPD with acute exacerbation  Increased work of breathing  Morbid obesity  Leukocytosis  History of CHF unspecified diastolic or systolic  GERD without esophagitis  Physical weakness and debility  Difficulty ambulating  Right ankle pain secondary to misstep  Coccyx injury on imaging  Lower back pain  Scoliosis of her lumbar spine  Degenerative changes of lumbar spine    Plan:  Labs and imaging reviewed  X-ray lumbar spine reviewed  Atarax as needed for anxiety  Continue efforts to wean oxygen  Check urinalysis  PT/OT consulted  Placement pending  Continue with IV ceftriaxone 1 g daily for 7 days total, transition to cefdinir on discharge  Out of bed to chair as much as she can tolerate, I suspect laying in bed may be contributing to her exacerbation of chronic lumbar spine issues  Refer to pulmonology as outpatient for repeat CT scan in 3 months  Telemetry monitoring continued  Continue with Ross James nebs twice daily  Continue with DuoNebs every 6 hours  Bronchopulmonary hygiene protocol  Bronchodilator protocol  Continue prednisone 40 mg daily, given her slow improvement, will taper slowly over the next several weeks  A.m. labs  Full code  VTE prophylaxis Lovenox  Clinical course to dictate further management  Discussed with nurse at the bedside    DVT prophylaxis:  Medical DVT prophylaxis orders are present.    CODE STATUS:   Level Of Support Discussed With: Patient  Code Status (Patient has no pulse and is not breathing): CPR (Attempt to Resuscitate)  Medical Interventions (Patient has pulse or is breathing): Full Support        Electronically signed by Tripp Arzate MD, 01/15/22, 6:30 PM EST.

## 2022-01-16 LAB
ALBUMIN SERPL-MCNC: 4.1 G/DL (ref 3.5–5.2)
ALP SERPL-CCNC: 121 U/L (ref 39–117)
ALT SERPL W P-5'-P-CCNC: 51 U/L (ref 1–33)
ANION GAP SERPL CALCULATED.3IONS-SCNC: 13.8 MMOL/L (ref 5–15)
AST SERPL-CCNC: 56 U/L (ref 1–32)
BASOPHILS # BLD AUTO: 0.03 10*3/MM3 (ref 0–0.2)
BASOPHILS NFR BLD AUTO: 0.2 % (ref 0–1.5)
BILIRUB CONJ SERPL-MCNC: 0.2 MG/DL (ref 0–0.3)
BILIRUB INDIRECT SERPL-MCNC: 0.4 MG/DL
BILIRUB SERPL-MCNC: 0.6 MG/DL (ref 0–1.2)
BUN SERPL-MCNC: 55 MG/DL (ref 8–23)
BUN/CREAT SERPL: 46.2 (ref 7–25)
CALCIUM SPEC-SCNC: 9.6 MG/DL (ref 8.6–10.5)
CHLORIDE SERPL-SCNC: 97 MMOL/L (ref 98–107)
CO2 SERPL-SCNC: 28.2 MMOL/L (ref 22–29)
CREAT SERPL-MCNC: 1.19 MG/DL (ref 0.57–1)
DEPRECATED RDW RBC AUTO: 44.8 FL (ref 37–54)
EOSINOPHIL # BLD AUTO: 0 10*3/MM3 (ref 0–0.4)
EOSINOPHIL NFR BLD AUTO: 0 % (ref 0.3–6.2)
ERYTHROCYTE [DISTWIDTH] IN BLOOD BY AUTOMATED COUNT: 14.4 % (ref 12.3–15.4)
GFR SERPL CREATININE-BSD FRML MDRD: 45 ML/MIN/1.73
GLUCOSE SERPL-MCNC: 145 MG/DL (ref 65–99)
HCT VFR BLD AUTO: 43.6 % (ref 34–46.6)
HGB BLD-MCNC: 14.5 G/DL (ref 12–15.9)
IMM GRANULOCYTES # BLD AUTO: 0.12 10*3/MM3 (ref 0–0.05)
IMM GRANULOCYTES NFR BLD AUTO: 0.9 % (ref 0–0.5)
LYMPHOCYTES # BLD AUTO: 1.35 10*3/MM3 (ref 0.7–3.1)
LYMPHOCYTES NFR BLD AUTO: 9.9 % (ref 19.6–45.3)
MAGNESIUM SERPL-MCNC: 2.7 MG/DL (ref 1.6–2.4)
MCH RBC QN AUTO: 28.4 PG (ref 26.6–33)
MCHC RBC AUTO-ENTMCNC: 33.3 G/DL (ref 31.5–35.7)
MCV RBC AUTO: 85.3 FL (ref 79–97)
MONOCYTES # BLD AUTO: 1.22 10*3/MM3 (ref 0.1–0.9)
MONOCYTES NFR BLD AUTO: 9 % (ref 5–12)
NEUTROPHILS NFR BLD AUTO: 10.87 10*3/MM3 (ref 1.7–7)
NEUTROPHILS NFR BLD AUTO: 80 % (ref 42.7–76)
NRBC BLD AUTO-RTO: 0 /100 WBC (ref 0–0.2)
PHOSPHATE SERPL-MCNC: 4.4 MG/DL (ref 2.5–4.5)
PLATELET # BLD AUTO: 295 10*3/MM3 (ref 140–450)
PMV BLD AUTO: 9.3 FL (ref 6–12)
POTASSIUM SERPL-SCNC: 3.3 MMOL/L (ref 3.5–5.2)
PROT SERPL-MCNC: 7.3 G/DL (ref 6–8.5)
RBC # BLD AUTO: 5.11 10*6/MM3 (ref 3.77–5.28)
SODIUM SERPL-SCNC: 139 MMOL/L (ref 136–145)
WBC NRBC COR # BLD: 13.59 10*3/MM3 (ref 3.4–10.8)

## 2022-01-16 PROCEDURE — 25010000002 CEFTRIAXONE PER 250 MG: Performed by: FAMILY MEDICINE

## 2022-01-16 PROCEDURE — 63710000001 PROMETHAZINE PER 12.5 MG: Performed by: HOSPITALIST

## 2022-01-16 PROCEDURE — 83735 ASSAY OF MAGNESIUM: CPT | Performed by: FAMILY MEDICINE

## 2022-01-16 PROCEDURE — 80048 BASIC METABOLIC PNL TOTAL CA: CPT | Performed by: FAMILY MEDICINE

## 2022-01-16 PROCEDURE — 94799 UNLISTED PULMONARY SVC/PX: CPT

## 2022-01-16 PROCEDURE — 80076 HEPATIC FUNCTION PANEL: CPT | Performed by: FAMILY MEDICINE

## 2022-01-16 PROCEDURE — 85025 COMPLETE CBC W/AUTO DIFF WBC: CPT | Performed by: FAMILY MEDICINE

## 2022-01-16 PROCEDURE — 84100 ASSAY OF PHOSPHORUS: CPT | Performed by: FAMILY MEDICINE

## 2022-01-16 PROCEDURE — 99232 SBSQ HOSP IP/OBS MODERATE 35: CPT | Performed by: FAMILY MEDICINE

## 2022-01-16 PROCEDURE — 25010000002 ENOXAPARIN PER 10 MG: Performed by: FAMILY MEDICINE

## 2022-01-16 PROCEDURE — 63710000001 PREDNISONE PER 1 MG: Performed by: FAMILY MEDICINE

## 2022-01-16 PROCEDURE — 63710000001 PREDNISONE PER 5 MG: Performed by: FAMILY MEDICINE

## 2022-01-16 RX ORDER — PREDNISONE 20 MG/1
20 TABLET ORAL
Status: DISCONTINUED | OUTPATIENT
Start: 2022-01-17 | End: 2022-01-18 | Stop reason: HOSPADM

## 2022-01-16 RX ORDER — POTASSIUM CHLORIDE 750 MG/1
20 CAPSULE, EXTENDED RELEASE ORAL
Status: COMPLETED | OUTPATIENT
Start: 2022-01-16 | End: 2022-01-16

## 2022-01-16 RX ADMIN — IPRATROPIUM BROMIDE AND ALBUTEROL SULFATE 3 ML: .5; 2.5 SOLUTION RESPIRATORY (INHALATION) at 18:26

## 2022-01-16 RX ADMIN — FUROSEMIDE 40 MG: 40 TABLET ORAL at 09:26

## 2022-01-16 RX ADMIN — ARFORMOTEROL TARTRATE 15 MCG: 15 SOLUTION RESPIRATORY (INHALATION) at 18:26

## 2022-01-16 RX ADMIN — HYDROXYZINE HYDROCHLORIDE 25 MG: 25 TABLET, FILM COATED ORAL at 09:26

## 2022-01-16 RX ADMIN — SENNOSIDES AND DOCUSATE SODIUM 2 TABLET: 50; 8.6 TABLET ORAL at 20:07

## 2022-01-16 RX ADMIN — POTASSIUM CHLORIDE 20 MEQ: 750 CAPSULE, EXTENDED RELEASE ORAL at 09:26

## 2022-01-16 RX ADMIN — ATORVASTATIN CALCIUM 40 MG: 40 TABLET, FILM COATED ORAL at 20:07

## 2022-01-16 RX ADMIN — SODIUM CHLORIDE, PRESERVATIVE FREE 10 ML: 5 INJECTION INTRAVENOUS at 09:25

## 2022-01-16 RX ADMIN — ESCITALOPRAM OXALATE 20 MG: 10 TABLET ORAL at 09:25

## 2022-01-16 RX ADMIN — ENOXAPARIN SODIUM 40 MG: 40 INJECTION SUBCUTANEOUS at 09:24

## 2022-01-16 RX ADMIN — Medication 5 MG: at 20:08

## 2022-01-16 RX ADMIN — HYDROCODONE BITARTRATE AND ACETAMINOPHEN 1 TABLET: 5; 325 TABLET ORAL at 14:28

## 2022-01-16 RX ADMIN — GABAPENTIN 800 MG: 400 CAPSULE ORAL at 05:08

## 2022-01-16 RX ADMIN — POTASSIUM CHLORIDE 20 MEQ: 750 CAPSULE, EXTENDED RELEASE ORAL at 14:27

## 2022-01-16 RX ADMIN — PROMETHAZINE HYDROCHLORIDE 12.5 MG: 12.5 TABLET ORAL at 14:31

## 2022-01-16 RX ADMIN — GABAPENTIN 800 MG: 400 CAPSULE ORAL at 20:07

## 2022-01-16 RX ADMIN — HYDROCODONE BITARTRATE AND ACETAMINOPHEN 1 TABLET: 10; 325 TABLET ORAL at 20:08

## 2022-01-16 RX ADMIN — ENOXAPARIN SODIUM 40 MG: 40 INJECTION SUBCUTANEOUS at 20:08

## 2022-01-16 RX ADMIN — GABAPENTIN 800 MG: 400 CAPSULE ORAL at 14:27

## 2022-01-16 RX ADMIN — CEFTRIAXONE SODIUM 1 G: 1 INJECTION, SOLUTION INTRAVENOUS at 09:25

## 2022-01-16 RX ADMIN — PREDNISONE 30 MG: 20 TABLET ORAL at 09:25

## 2022-01-16 RX ADMIN — SODIUM CHLORIDE, PRESERVATIVE FREE 10 ML: 5 INJECTION INTRAVENOUS at 20:08

## 2022-01-16 RX ADMIN — BUDESONIDE 0.5 MG: 0.5 SUSPENSION RESPIRATORY (INHALATION) at 18:26

## 2022-01-16 RX ADMIN — ATENOLOL 25 MG: 25 TABLET ORAL at 09:26

## 2022-01-16 RX ADMIN — POTASSIUM CHLORIDE 20 MEQ: 750 CAPSULE, EXTENDED RELEASE ORAL at 17:57

## 2022-01-16 RX ADMIN — IPRATROPIUM BROMIDE AND ALBUTEROL SULFATE 3 ML: .5; 2.5 SOLUTION RESPIRATORY (INHALATION) at 13:11

## 2022-01-16 RX ADMIN — HYDROCODONE BITARTRATE AND ACETAMINOPHEN 1 TABLET: 10; 325 TABLET ORAL at 09:26

## 2022-01-16 NOTE — PROGRESS NOTES
[facility]   Hospitalist Progress Note  Date: 2022  Patient Name: Ashtyn Jean Baptiste  : 1950  MRN: [id number]  Date of admission: 1/10/2022      Subjective   Subjective     Chief complaint: Shortness of breath     Summary:  71-year-old female with history of COPD, GERD without esophagitis, CHF not otherwise specified was hospitalized on 1/10/2022 with shortness of breath symptoms, left lower lobe pneumonia, acute hypoxemic respiratory failure secondary to left lower lobe pneumonia, ruled out for COVID-19, COPD with acute exacerbation, increased work of breathing and conversational dyspnea, started on supplemental oxygen, empiric antibiotics, preliminary sputum culture showing gram-positive bacilli and cocci, final result did not grow any particular bacteria.     Interval follow-up: atient seen and examined this morning, no acute distress, no acute major overnight events, remains very anxious, still has lower back pain, nursing staff reports she makes no effort ambulating out of bed to the chair, every time she is helped moving, she tends to go limp and proceeds to lower herself down, her mood is labile, she remains nauseous but is tolerating oral intake without issue, nursing staff document attention seeking behavior, she refused to feed herself yesterday and asked to have staff feed her.  Staff are able to wean down her oxygen to 1 L, her sats are in the 80s to 90s, her potassium is 3.3, creatinine is 1.19, replace potassium ordered, white blood cell count is 13,000.  Her blood pressure and other vital signs have been stable.  She does not identify anything in particular with me other than lower back pain and asking for us to change her bed mattress, stated nursing staff can contact the proper individuals to sort out her bed situation.    Review of systems:  All systems reviewed and negative except for cough, shortness of breath with exertion, back pain, anxiety    Objective   Objective     Vitals:    Temp:  [97.2 °F (36.2 °C)-98.1 °F (36.7 °C)] 97.2 °F (36.2 °C)  Heart Rate:  [72-94] 78  Resp:  [20] 20  BP: (149-170)/(59-76) 153/71  Flow (L/min):  [1] 1  Physical Exam    Constitutional: Awake, alert, no acute distress morbidly obese, on 1 nasal cannula laying on her side              Eyes: Pupils equal, sclerae anicteric, no conjunctival injection              HENT: NCAT, mucous membranes moist, sinus congestion              Neck: Supple, full range of motion              Respiratory: Air entry bilaterally, no significant rhonchi or wheezing              Cardiovascular: RRR, no murmurs, rubs, or gallops, palpable pedal pulses bilaterally              Gastrointestinal: Positive bowel sounds, soft, nontender, nondistended              Musculoskeletal: No bilateral ankle edema, no clubbing or cyanosis to extremities, palpated spine no paraspinal tenderness, no muscle knots, no bony tenderness, no tenderness to her back sides or muscles              Psychiatric: Anxious mood              Neurologic: Oriented x 3, strength symmetric in all extremities weakness throughout, Cranial Nerves grossly intact to confrontation, speech clear              Skin: No rashes       Result Review    Result Review:  I have personally reviewed the results from the time of this admission to 1/16/2022 15:46 EST and agree with these findings:  [x]  Laboratory   CBC    CBC 1/14/22 1/15/22 1/16/22   WBC 14.78 (A) 13.72 (A) 13.59 (A)   RBC 4.68 4.95 5.11   Hemoglobin 13.4 14.2 14.5   Hematocrit 41.9 43.8 43.6   MCV 89.5 88.5 85.3   MCH 28.6 28.7 28.4   MCHC 32.0 32.4 33.3   RDW 14.6 14.5 14.4   Platelets 295 306 295   (A) Abnormal value            BMP    BMP 1/14/22 1/15/22 1/16/22   BUN 45 (A) 46 (A) 55 (A)   Creatinine 1.11 (A) 1.18 (A) 1.19 (A)   Sodium 141 139 139   Potassium 3.9 3.7 3.3 (A)   Chloride 102 95 (A) 97 (A)   CO2 27.4 28.2 28.2   Calcium 9.6 9.5 9.6   (A) Abnormal value            LIVER FUNCTION TESTS:      Lab  01/16/22  0451 01/15/22  1315 01/14/22  0450 01/13/22  0418 01/12/22  0452 01/11/22  0504 01/11/22  0504 01/10/22  1059 01/10/22  1059   TOTAL PROTEIN 7.3 7.7 7.3 7.9 7.3   < > 7.6   < > 7.5   ALBUMIN 4.10 4.40 4.30 4.40 4.10   < > 4.20   < > 3.90   GLOBULIN  --   --   --   --   --   --   --   --  3.6   ALT (SGPT) 51* 49* 36* 25 13   < > 12   < > 11   AST (SGOT) 56* 67* 68* 43* 22   < > 13   < > 16   BILIRUBIN 0.6 0.6 0.4 0.3 0.3   < > 0.3   < > 0.4   INDIRECT BILIRUBIN 0.4  --   --   --   --   --   --   --   --    BILIRUBIN DIRECT 0.2 <0.2 <0.2 <0.2 <0.2   < > <0.2  --   --    ALK PHOS 121* 128* 123* 142* 128*   < > 139*   < > 129*    < > = values in this interval not displayed.       [x]  Microbiology   Blood Culture   Date Value Ref Range Status   01/10/2022 No growth at 5 days  Final   01/10/2022 No growth at 5 days  Final     No results found for: BCIDPCR, CXREFLEX, CSFCX, CULTURETIS  No results found for: CULTURES, HSVCX, URCX  No results found for: EYECULTURE, GCCX, HSVCULTURE, LABHSV  No results found for: LEGIONELLA, MRSACX, MUMPSCX, MYCOPLASCX  No results found for: NOCARDIACX, STOOLCX  No results found for: THROATCX, UNSTIMCULT, URINECX, CULTURE, VZVCULTUR  No results found for: VIRALCULTU, WOUNDCX    [x]  Radiology XR Spine Lumbar Complete With Flex & Ext    Result Date: 1/15/2022  PROCEDURE: XR SPINE LUMBAR COMPLETE W FLEX EXT  COMPARISON: None  INDICATIONS: lower back pain  FINDINGS:   Levoscoliosis is present.  Disc degeneration and facet OA are present throughout the lumbar spine.  There is advanced disc degeneration at L3-L4.  No evidence of acute fracture or subluxation in the lumbar spine.  There is an IVC filter in place.  There is anterior angulation of the coccyx.  IMPRESSION: 1. Anterior angulation of the coccyx may be chronic in nature.  An acute fracture is possible if the patient has pain in this region.  2. Scoliosis and degenerative change in the lumbar spine.   DEVANTE ZAVALA MD        Electronically Signed and Approved By: DEVANTE ZAVALA MD on 1/15/2022 at 12:12             XR Ankle 3+ View Right    Result Date: 1/12/2022  PROCEDURE: XR ANKLE 3+ VW RIGHT  COMPARISON: None  INDICATIONS: RIGHT ANKLE PAIN FROM TWISTING INJURY  FINDINGS:  No fracture.  No dislocation.  Small plantar spur.       Small plantar spur.  No acute findings      MICHAEL LERNER MD       Electronically Signed and Approved By: MICHAEL LERNER MD on 1/12/2022 at 15:03             CT Chest Without Contrast Diagnostic    Result Date: 1/12/2022  PROCEDURE: CT CHEST WO CONTRAST DIAGNOSTIC  COMPARISON: None  INDICATIONS: Pneumonia, effusion or abscess suspected  TECHNIQUE: CT images were created without the administration of contrast material.   PROTOCOL:   Standard imaging protocol performed    RADIATION:   DLP: 552mGy*cm   Automated exposure control was utilized to minimize radiation dose.  FINDINGS:  Scattered areas of predominately subpleural interstitial prominence.  There is emphysema.  There is no dense consolidation.  No pleural fluid.  No adenopathy in the chest.  No acute findings in the included upper abdomen.       Scattered areas of predominately subpleural interstitial prominence may represent subpleural scarring or possibly interstitial lung disease.  Post infectious or inflammatory change could have this appearance.  No dense consolidation.     MICHAEL LERNER MD       Electronically Signed and Approved By: MICHAEL LERNER MD on 1/12/2022 at 8:07             XR Chest 1 View    Result Date: 1/10/2022  PROCEDURE: XR CHEST 1 VW  COMPARISON: None  INDICATIONS: SOB, CHEST PAINS  FINDINGS:  Heart size is upper limits of normal.  Interstitial prominence in the mid to lower lung zones.  There is an area of more dense opacity in the lateral left lower lung zone.       Findings suspicious for pneumonia, particularly in the left lower lung zone.  Correlate with presentation.       MICHAEL LERNER MD       Electronically Signed and Approved  By: MICHAEL LERNER MD on 1/10/2022 at 11:20               []  EKG/Telemetry   []  Cardiology/Vascular   []  Pathology  [x]  Old records  []  Other:    Assessment/Plan   Assessment / Plan     Assessment/Plan:  Assessment:  Left lower lobe pneumonia likely community-acquired pneumonia staph versus strep versus atypical  Acute hypoxemic respiratory failure secondary to pneumonia  COPD with acute exacerbation  Increased work of breathing  Morbid obesity  Leukocytosis  History of CHF unspecified diastolic or systolic  GERD without esophagitis  Physical weakness and debility  Difficulty ambulating  Right ankle pain secondary to misstep  Coccyx injury on imaging  Lower back pain  Scoliosis of her lumbar spine  Degenerative changes of lumbar spine     Plan:  Labs and imaging reviewed  Reduce prednisone to 20 mg daily for 5 days then 10 mg for 5 days after  Encourage patient to participate in her care  Psychiatry consult with staff concerns about attention seeking  Atarax as needed for anxiety  Continue efforts to wean oxygen  PT/OT consulted  Continue gabapentin 800 mg 3 times daily  Continue Lexapro 20 mg daily  Continue atenolol 25 mg daily  Placement pending  Out of bed to chair as much as she can tolerate, I suspect laying in bed may be contributing to her exacerbation of chronic lumbar spine issues  Refer to pulmonology as outpatient for repeat CT scan in 3 months  Continue with Ross James nebs twice daily  Bronchopulmonary hygiene protocol  Bronchodilator protocol  A.m. labs  Full code  VTE prophylaxis Lovenox  Clinical course to dictate further management  Discussed with nurse at the bedside  DVT prophylaxis:  Medical DVT prophylaxis orders are present.    CODE STATUS:   Level Of Support Discussed With: Patient  Code Status (Patient has no pulse and is not breathing): CPR (Attempt to Resuscitate)  Medical Interventions (Patient has pulse or is breathing): Full Support        Electronically signed by Tripp FREEMAN  MD Huey, 01/16/22, 3:46 PM EST.

## 2022-01-16 NOTE — PLAN OF CARE
"Goal Outcome Evaluation:              Outcome Summary: Patient alert and oriented. Patient is attention-seeking from staff by calling out approximately every 5-10 mins for various things, even if staff had just left the room and patient had stated that she didn't need anything while staff was present in the room. This evening, patient attempted to convince staff that she is \"unable\" to feed herself. Patient has been feeding herself throughout her hospital stay. Nurse goes into room, and talks to patient about her symptoms, as she continues to attempt to convince staff that she cannot do anything for herself. Patient has NO stroke symptoms at this time. Nurse informed patient that if she is now unable to feed herself, then the cause would need to be investigated. Patient then states \"Oh, I don't want to do that.\" Patient was repositioned in bed. Patient was asked to demonstrate herself holding eating utensils. Patient able to hold eating utensils and feed herself. Patient is also noted screaming from her room often during shift, after pulling out her call light from the wall multiple times, for staff to come to her room. Patient is checked on regularly. Patient was also noted adjusting her bed by herself with bed controls, but often claims that she is unable to do it herself in the presence of staff. Patient continually asks to be repositioned in bed, after she has been repositioned by staff, as she repositions herself often in order to gain staff attention. Patient also calls daughter, and complains about staff, and claims that she is \"neglected.\" Nurse spoke with patient son this afternoon, and informed him of this behavior. MD also aware. Will continue to monitor.  "

## 2022-01-17 LAB
ALBUMIN SERPL-MCNC: 4.3 G/DL (ref 3.5–5.2)
ALP SERPL-CCNC: 119 U/L (ref 39–117)
ALT SERPL W P-5'-P-CCNC: 54 U/L (ref 1–33)
ANION GAP SERPL CALCULATED.3IONS-SCNC: 15.5 MMOL/L (ref 5–15)
AST SERPL-CCNC: 50 U/L (ref 1–32)
BASOPHILS # BLD AUTO: 0.04 10*3/MM3 (ref 0–0.2)
BASOPHILS NFR BLD AUTO: 0.3 % (ref 0–1.5)
BILIRUB CONJ SERPL-MCNC: 0.2 MG/DL (ref 0–0.3)
BILIRUB INDIRECT SERPL-MCNC: 0.5 MG/DL
BILIRUB SERPL-MCNC: 0.7 MG/DL (ref 0–1.2)
BUN SERPL-MCNC: 55 MG/DL (ref 8–23)
BUN/CREAT SERPL: 41.4 (ref 7–25)
CALCIUM SPEC-SCNC: 9.6 MG/DL (ref 8.6–10.5)
CHLORIDE SERPL-SCNC: 95 MMOL/L (ref 98–107)
CO2 SERPL-SCNC: 27.5 MMOL/L (ref 22–29)
CREAT SERPL-MCNC: 1.33 MG/DL (ref 0.57–1)
DEPRECATED RDW RBC AUTO: 44.3 FL (ref 37–54)
EOSINOPHIL # BLD AUTO: 0.01 10*3/MM3 (ref 0–0.4)
EOSINOPHIL NFR BLD AUTO: 0.1 % (ref 0.3–6.2)
ERYTHROCYTE [DISTWIDTH] IN BLOOD BY AUTOMATED COUNT: 14.4 % (ref 12.3–15.4)
GFR SERPL CREATININE-BSD FRML MDRD: 39 ML/MIN/1.73
GLUCOSE SERPL-MCNC: 139 MG/DL (ref 65–99)
HCT VFR BLD AUTO: 43.6 % (ref 34–46.6)
HGB BLD-MCNC: 14.6 G/DL (ref 12–15.9)
IMM GRANULOCYTES # BLD AUTO: 0.16 10*3/MM3 (ref 0–0.05)
IMM GRANULOCYTES NFR BLD AUTO: 1.2 % (ref 0–0.5)
LYMPHOCYTES # BLD AUTO: 1.32 10*3/MM3 (ref 0.7–3.1)
LYMPHOCYTES NFR BLD AUTO: 10 % (ref 19.6–45.3)
MAGNESIUM SERPL-MCNC: 2.6 MG/DL (ref 1.6–2.4)
MCH RBC QN AUTO: 28.7 PG (ref 26.6–33)
MCHC RBC AUTO-ENTMCNC: 33.5 G/DL (ref 31.5–35.7)
MCV RBC AUTO: 85.7 FL (ref 79–97)
MONOCYTES # BLD AUTO: 1.38 10*3/MM3 (ref 0.1–0.9)
MONOCYTES NFR BLD AUTO: 10.5 % (ref 5–12)
NEUTROPHILS NFR BLD AUTO: 10.29 10*3/MM3 (ref 1.7–7)
NEUTROPHILS NFR BLD AUTO: 77.9 % (ref 42.7–76)
NRBC BLD AUTO-RTO: 0 /100 WBC (ref 0–0.2)
PHOSPHATE SERPL-MCNC: 3.4 MG/DL (ref 2.5–4.5)
PLATELET # BLD AUTO: 272 10*3/MM3 (ref 140–450)
PMV BLD AUTO: 9.4 FL (ref 6–12)
POTASSIUM SERPL-SCNC: 3.6 MMOL/L (ref 3.5–5.2)
PROT SERPL-MCNC: 7.4 G/DL (ref 6–8.5)
RBC # BLD AUTO: 5.09 10*6/MM3 (ref 3.77–5.28)
SODIUM SERPL-SCNC: 138 MMOL/L (ref 136–145)
WBC NRBC COR # BLD: 13.2 10*3/MM3 (ref 3.4–10.8)

## 2022-01-17 PROCEDURE — 94799 UNLISTED PULMONARY SVC/PX: CPT

## 2022-01-17 PROCEDURE — 83735 ASSAY OF MAGNESIUM: CPT | Performed by: FAMILY MEDICINE

## 2022-01-17 PROCEDURE — 99232 SBSQ HOSP IP/OBS MODERATE 35: CPT | Performed by: FAMILY MEDICINE

## 2022-01-17 PROCEDURE — 85025 COMPLETE CBC W/AUTO DIFF WBC: CPT | Performed by: FAMILY MEDICINE

## 2022-01-17 PROCEDURE — 80048 BASIC METABOLIC PNL TOTAL CA: CPT | Performed by: FAMILY MEDICINE

## 2022-01-17 PROCEDURE — 25010000002 ENOXAPARIN PER 10 MG: Performed by: FAMILY MEDICINE

## 2022-01-17 PROCEDURE — 25010000002 ONDANSETRON PER 1 MG: Performed by: FAMILY MEDICINE

## 2022-01-17 PROCEDURE — 84100 ASSAY OF PHOSPHORUS: CPT | Performed by: FAMILY MEDICINE

## 2022-01-17 PROCEDURE — 63710000001 PREDNISONE PER 1 MG: Performed by: FAMILY MEDICINE

## 2022-01-17 PROCEDURE — 80076 HEPATIC FUNCTION PANEL: CPT | Performed by: FAMILY MEDICINE

## 2022-01-17 RX ORDER — IPRATROPIUM BROMIDE AND ALBUTEROL SULFATE 2.5; .5 MG/3ML; MG/3ML
3 SOLUTION RESPIRATORY (INHALATION) EVERY 4 HOURS PRN
Status: DISCONTINUED | OUTPATIENT
Start: 2022-01-17 | End: 2022-01-18 | Stop reason: HOSPADM

## 2022-01-17 RX ADMIN — SENNOSIDES AND DOCUSATE SODIUM 2 TABLET: 50; 8.6 TABLET ORAL at 20:43

## 2022-01-17 RX ADMIN — HYDROCODONE BITARTRATE AND ACETAMINOPHEN 1 TABLET: 10; 325 TABLET ORAL at 20:43

## 2022-01-17 RX ADMIN — SENNOSIDES AND DOCUSATE SODIUM 2 TABLET: 50; 8.6 TABLET ORAL at 08:30

## 2022-01-17 RX ADMIN — FUROSEMIDE 40 MG: 40 TABLET ORAL at 08:30

## 2022-01-17 RX ADMIN — SODIUM CHLORIDE, PRESERVATIVE FREE 10 ML: 5 INJECTION INTRAVENOUS at 20:44

## 2022-01-17 RX ADMIN — ONDANSETRON 4 MG: 2 INJECTION INTRAMUSCULAR; INTRAVENOUS at 00:11

## 2022-01-17 RX ADMIN — PREDNISONE 20 MG: 20 TABLET ORAL at 08:30

## 2022-01-17 RX ADMIN — ENOXAPARIN SODIUM 40 MG: 40 INJECTION SUBCUTANEOUS at 08:29

## 2022-01-17 RX ADMIN — ESCITALOPRAM OXALATE 20 MG: 10 TABLET ORAL at 08:30

## 2022-01-17 RX ADMIN — ATORVASTATIN CALCIUM 40 MG: 40 TABLET, FILM COATED ORAL at 20:43

## 2022-01-17 RX ADMIN — ENOXAPARIN SODIUM 40 MG: 40 INJECTION SUBCUTANEOUS at 20:43

## 2022-01-17 RX ADMIN — Medication 5 MG: at 20:43

## 2022-01-17 RX ADMIN — SODIUM CHLORIDE, PRESERVATIVE FREE 10 ML: 5 INJECTION INTRAVENOUS at 08:30

## 2022-01-17 RX ADMIN — ATENOLOL 25 MG: 25 TABLET ORAL at 08:30

## 2022-01-17 RX ADMIN — HYDROCODONE BITARTRATE AND ACETAMINOPHEN 1 TABLET: 10; 325 TABLET ORAL at 03:58

## 2022-01-17 RX ADMIN — GABAPENTIN 800 MG: 400 CAPSULE ORAL at 03:57

## 2022-01-17 RX ADMIN — HYDROXYZINE HYDROCHLORIDE 25 MG: 25 TABLET, FILM COATED ORAL at 20:43

## 2022-01-17 RX ADMIN — HYDROXYZINE HYDROCHLORIDE 25 MG: 25 TABLET, FILM COATED ORAL at 00:11

## 2022-01-17 RX ADMIN — GABAPENTIN 800 MG: 400 CAPSULE ORAL at 12:06

## 2022-01-17 RX ADMIN — GABAPENTIN 800 MG: 400 CAPSULE ORAL at 20:43

## 2022-01-17 RX ADMIN — HYDROXYZINE HYDROCHLORIDE 25 MG: 25 TABLET, FILM COATED ORAL at 08:30

## 2022-01-17 NOTE — CONSULTS
" [facility]   PSYCHIATRIC CONSULTATION    Patient Name: Ashtyn Jean Baptiste  : 1950  MRN: [id number]  Primary Care Physician:  Virgilio Morales MD  Date of admission: 1/10/2022    Referring Provider: Dr. Arzate  Reason for Consultation: Possible depression, attention seeking behavior    Subjective   Subjective     Chief Complaint: \"My legs\"    HPI:     Ashtyn Jean Baptiste is a 71 y.o. female reports that she came into the hospital because of weakness in her legs, H&P states she was here for shortness fair.  Patient has had somewhat of an extended hospital stay to this point.  She is been noted to be somewhat needy or attention seeking.  Will make claims that she cannot walk or cannot feed herself when she is fully capable.  She has not required any medicines for acute agitation or combativeness, but has had hydroxyzine added to her regimen for acute anxiety.  She has had it once a day over the last 2 days and had it twice today.    Patient does not know why psychiatry was asked to see her and she reports that \"I am not crazy.\"  Patient is calm and cooperative the exam.  She makes fairly good eye contact.  She is appropriate throughout the course evaluation.  Patient reports that she can get nervous or anxious from time to time.  Reports she will just feel a little nervous or anxious and this occurs about 2 times per week.  She also reports that she will feel down or depressed at times but is not persistent and occurs 2 times per week.    Patient is on escitalopram.  She does not recall if she was on prior to coming to the hospital, but it is listed in her home medicines.  She is unsure if it has been effective because she does not know how long she has been on it.  I do not know that I would change her medication at this point.    Patient reports that she is actually feeling better today.  She reports that she got up and got a chair and her legs feel better and stronger.  She also reports that she would like " to go to rehab facility to work on her strength after discharge.  She reports that she continues to have some improvement.    She has no acute agitation or attention seeking behavior during the interview.  If she does have this attention seeking behavior is probably more of a personality structure issue.  She is on appropriate medications for her reported depression and anxiety that she reports are currently in check.    She denies significant sleep disturbance.  Denies feeling hopeless or helpless.  Reports her energy level is a little low but overall okay.  Denying suicidal or homicidal ideation.    Review of Systems   All systems were reviewed and negative except for: Weakness in her legs    Personal History     Past Medical History:   Diagnosis Date   • CHF (congestive heart failure) ([facility])    • COPD (chronic obstructive pulmonary disease) ([facility])    • GERD (gastroesophageal reflux disease)        Past Surgical History:   Procedure Laterality Date   • APPENDECTOMY     • HYSTERECTOMY         Past Psychiatric History: Denies ever seeing a psychiatrist    Psychiatric Hospitalizations: None    Suicide Attempts: None    Prior Treatment and Medications Tried: Does not recall other than citalopram she is on currently        Family History: family history is not on file. Otherwise pertinent FHx was reviewed and not pertinent to current issue.    Social History:  reports that she has quit smoking. She has never used smokeless tobacco. She reports that she does not drink alcohol and does not use drugs.    Home Medications:  HYDROcodone-acetaminophen, albuterol sulfate HFA, atenolol, atorvastatin, escitalopram, furosemide, gabapentin, and omeprazole      Allergies:  Allergies   Allergen Reactions   • Latex Hives   • Codeine Nausea And Vomiting   • Morphine Nausea And Vomiting       Objective   Objective     Vitals:   Temp:  [97.2 °F (36.2 °C)-97.7 °F (36.5 °C)] 97.7 °F (36.5 °C)  Heart Rate:  [69-81] 71  Resp:  [20] 20  BP:  "(139171)/(62-91) 139/91  Flow (L/min):  [1] 1  Physical Exam       Mental Status Exam:     Hygiene:   good  Cooperation:  Cooperative  Eye Contact:  Good  Psychomotor Behavior:  Appropriate  Mood: \"Okay\"  Affect:  Euthymic  Speech:  Normal  Language: Appropriate, relevant  Thought Process:  Goal directed  Thought Content:  Normal  Suicidal:  None  Homicidal:  None  Hallucinations:  None  Delusion:  None  Memory:  Intact  Orientation:  Person, Place, Time and Situation  Reliability:  good  Insight:  Fair  Judgement:  Fair  Impulse Control:  Good    Result Review    Result Review:  I have personally reviewed the results from the time of this admission to 1/17/2022 14:08 EST and agree with these findings:  []  Laboratory  []  Microbiology  []  Radiology  []  EKG/Telemetry   []  Cardiology/Vascular   []  Pathology    []  Old records  []  Other:  Most notable findings include:     Assessment/Plan   Assessment / Plan     Brief Patient Summary:  Ashtyn Jean Baptiste is a 71 y.o. female who reports depression and anxiety well controlled with citalopram.  Has had periods of showing some helplessness here in the hospital.    Active Hospital Problems:  Active Hospital Problems    Diagnosis    • Acute respiratory failure with hypoxia (HCC)        Plan:   1) patient reports having some nervousness and anxiety from time to time.  She also reports feeling down or depressed at times.  Patient's behaviors not necessarily related to her depression but probably overall personality structure and suspect she has probably been like that at home.  Not much to do from a psychiatric standpoint.  Continue escitalopram may change that to another agent if she has any further depression or anxiety.  2) given her long list of multiple medical issues as well as current polypharmacy would not add any mood stabilizers to add at this problem polypharmacy and the potential for drug drug interactions with her behaviors are not such that they require being " addressed pharmacologically.  3) we will follow peripherally  4) no need for acute inpatient psychiatric care    Electronically signed by Munir Wilburn MD, 01/17/22, 2:08 PM EST.

## 2022-01-17 NOTE — CONSULTS
"Nutrition Services    Patient Name: Ashtyn Jean Baptiste  YOB: 1950  MRN: [id number]  Admission date: 1/10/2022      CLINICAL NUTRITION ASSESSMENT      Reason for Assessment  LOS     H&P:    Past Medical History:   Diagnosis Date   • CHF (congestive heart failure) (HCC)    • COPD (chronic obstructive pulmonary disease) (HCC)    • GERD (gastroesophageal reflux disease)         Current Problems:   Active Hospital Problems    Diagnosis    • Acute respiratory failure with hypoxia (HCC)         Nutrition/Diet History         Narrative     Following for LOS day 7. Pt had good intake but it has decreased the last 2 days. Pt was no available to answer questions at time of interview. Will add Magic Cup bid (580kcal, 18gPro) to encourage kcal intake. Will monitor and follow.     Anthropometrics        Current Height, Weight Height: 157.5 cm (62\")  Weight: 112 kg (247 lb 9.2 oz)   Current BMI Body mass index is 45.28 kg/m².       Weight Hx  Wt Readings from Last 30 Encounters:   01/10/22 2100 112 kg (247 lb 9.2 oz)   01/10/22 1100 112 kg (246 lb 7.6 oz)   10/08/21 1431 113 kg (248 lb 3.2 oz)            Wt Change Observation No significant wt changes noted     Estimated/Assessed Needs    Using IBW of 50.1kg   Energy Requirements    EST Needs (kcal/day) 3129-8181 (30-35kcal/kg)       Protein Requirements    EST Daily Needs (g/day) 60 (1.2g/kg)       Fluid Requirements     Estimated Needs (mL/day) 1503 (30kcal/kg)     Labs/Medications         Pertinent Labs Reviewed.   Results from last 7 days   Lab Units 01/17/22  0414 01/16/22  0451 01/15/22  1315   SODIUM mmol/L 138 139 139   POTASSIUM mmol/L 3.6 3.3* 3.7   CHLORIDE mmol/L 95* 97* 95*   CO2 mmol/L 27.5 28.2 28.2   BUN mg/dL 55* 55* 46*   CREATININE mg/dL 1.33* 1.19* 1.18*   CALCIUM mg/dL 9.6 9.6 9.5   BILIRUBIN mg/dL 0.7 0.6 0.6   ALK PHOS U/L 119* 121* 128*   ALT (SGPT) U/L 54* 51* 49*   AST (SGOT) U/L 50* 56* 67*   GLUCOSE mg/dL 139* 145* 149*     Results from last " 7 days   Lab Units 01/17/22  0414 01/16/22  0451 01/16/22  0451 01/15/22  0445 01/15/22  0445   MAGNESIUM mg/dL 2.6*  --  2.7*  --  2.4   PHOSPHORUS mg/dL 3.4   < > 4.4   < > 3.4   HEMOGLOBIN g/dL 14.6   < > 14.5   < > 14.2   HEMATOCRIT % 43.6   < > 43.6   < > 43.8    < > = values in this interval not displayed.     COVID19   Date Value Ref Range Status   01/10/2022 Not Detected Not Detected - Ref. Range Final     Lab Results   Component Value Date    HGBA1C 5.6 04/22/2020         Pertinent Medications Reviewed.     Current Nutrition Orders & Evaluation of Intake       Oral Nutrition     Current PO Diet Diet Regular   Supplement No active supplement orders       Nutrition Diagnosis         Nutrition Dx Problem 1 Inadequate oral Intake related to inadequate energy Intake as evidenced by per nursing documentation.       Nutrition Intervention         Magic Cup BID      Monitor/Evaluation        Monitor PO intake, Supplement intake, Weight       Nutrition Discharge Plan         To be determined       Electronically signed by:  Elva Salinas RD  01/17/22 13:55 EST

## 2022-01-17 NOTE — PLAN OF CARE
Goal Outcome Evaluation:           Progress: no change   Calls out frequently with various complaints/requests. C/O generalized pain. Pain meds administered per orders.

## 2022-01-17 NOTE — PLAN OF CARE
Goal Outcome Evaluation:  Plan of Care Reviewed With: patient            Patient alert and able to make needs known. Patient is attention seeking. Calls out often. Patient has been up in chair today for several hours. [place].

## 2022-01-17 NOTE — PROGRESS NOTES
[facility]   Hospitalist Progress Note  Date: 2022  Patient Name: Ashtyn Jean Baptiste  : 1950  MRN: [id number]  Date of admission: 1/10/2022      Subjective   Subjective   Chief complaint: Shortness of breath     Summary:  71-year-old female with history of COPD, GERD without esophagitis, CHF not otherwise specified was hospitalized on 1/10/2022 with shortness of breath symptoms, left lower lobe pneumonia, acute hypoxemic respiratory failure secondary to left lower lobe pneumonia, ruled out for COVID-19, COPD with acute exacerbation, increased work of breathing and conversational dyspnea, started on supplemental oxygen, empiric antibiotics, preliminary sputum culture showing gram-positive bacilli and cocci, final result did not grow any particular bacteria.  Back pain, cute on chronic, likely exacerbated with hospitalization being in bed, PT/OT consulted, psychiatry consulted with staff concerns of personality issues, increased call light use, attention seeking behavior. Looking for placement.     Interval follow-up:    Patient seen and examined this morning, no acute distress, no acute major overnight events, remains on 1 L nasal cannula oxygen with a room to taper off, staff expressed concerns about her neediness and inability to take care of herself, continually calling out for attention, refusing to get up, does not wish to participate in any interventions.  Continues to have generalized pain, complaining of the bed, offered her to chance to sit in the bedside chair, stating that she could not get up.  She is able to move around the bed without any issue, she denies any fevers, chills, sweats, denies any cough or shortness of breath symptoms, she continues to have intermittent panic attacks.  Creatinine up to 1.33, potassium at 3.6, sodium 138, white blood cell count at 13,000.    Review of systems:  All systems reviewed and negative except for anxiety, back pain, shortness of breath, intermittent  cough.    Objective   Objective     Vitals:   Temp:  [97.2 °F (36.2 °C)-97.7 °F (36.5 °C)] 97.4 °F (36.3 °C)  Heart Rate:  [69-81] 76  Resp:  [18-20] 18  BP: (139-171)/(62-91) 142/89  Flow (L/min):  [1] 1  Physical Exam    Constitutional: Awake, alert, no acute distress morbidly obese, on 1 nasal cannula laying on her side, moving around the bed freely              Eyes: Pupils equal, sclerae anicteric, no conjunctival injection              HENT: NCAT, mucous membranes moist, sinus congestion              Neck: Supple, full range of motion              Respiratory: Air entry bilaterally, no significant rhonchi or wheezing              Cardiovascular: RRR, no murmurs, rubs, or gallops, palpable pedal pulses bilaterally              Gastrointestinal: Positive bowel sounds, soft, nontender, nondistended              Musculoskeletal: No bilateral ankle edema, no clubbing or cyanosis to extremities, palpated spine no paraspinal tenderness, no muscle knots, no bony tenderness, no tenderness to her back sides or muscles              Psychiatric: Anxious mood              Neurologic: Oriented x 3, strength symmetric in all extremities weakness throughout, Cranial Nerves grossly intact to confrontation, speech clear              Skin: No rashes       Result Review    Result Review:  I have personally reviewed the results from the time of this admission to 1/17/2022 17:55 EST and agree with these findings:  [x]  Laboratory   CBC    CBC 1/15/22 1/16/22 1/17/22   WBC 13.72 (A) 13.59 (A) 13.20 (A)   RBC 4.95 5.11 5.09   Hemoglobin 14.2 14.5 14.6   Hematocrit 43.8 43.6 43.6   MCV 88.5 85.3 85.7   MCH 28.7 28.4 28.7   MCHC 32.4 33.3 33.5   RDW 14.5 14.4 14.4   Platelets 306 295 272   (A) Abnormal value            BMP    BMP 1/15/22 1/16/22 1/17/22   BUN 46 (A) 55 (A) 55 (A)   Creatinine 1.18 (A) 1.19 (A) 1.33 (A)   Sodium 139 139 138   Potassium 3.7 3.3 (A) 3.6   Chloride 95 (A) 97 (A) 95 (A)   CO2 28.2 28.2 27.5   Calcium 9.5  9.6 9.6   (A) Abnormal value            LIVER FUNCTION TESTS:      Lab 01/17/22  0414 01/16/22  0451 01/15/22  1315 01/14/22  0450 01/13/22  0418 01/12/22  0452 01/11/22  0504   TOTAL PROTEIN 7.4 7.3 7.7 7.3 7.9   < > 7.6   ALBUMIN 4.30 4.10 4.40 4.30 4.40   < > 4.20   ALT (SGPT) 54* 51* 49* 36* 25   < > 12   AST (SGOT) 50* 56* 67* 68* 43*   < > 13   BILIRUBIN 0.7 0.6 0.6 0.4 0.3   < > 0.3   INDIRECT BILIRUBIN 0.5 0.4  --   --   --   --   --    BILIRUBIN DIRECT 0.2 0.2 <0.2 <0.2 <0.2   < > <0.2   ALK PHOS 119* 121* 128* 123* 142*   < > 139*    < > = values in this interval not displayed.       [x]  Microbiology No results found for: ACANTHNAEG, AFBCX, BPERTUSSISCX, BLOODCX  No results found for: BCIDPCR, CXREFLEX, CSFCX, CULTURETIS  No results found for: CULTURES, HSVCX, URCX  No results found for: EYECULTURE, GCCX, HSVCULTURE, LABHSV  No results found for: LEGIONELLA, MRSACX, MUMPSCX, MYCOPLASCX  No results found for: NOCARDIACX, STOOLCX  No results found for: THROATCX, UNSTIMCULT, URINECX, CULTURE, VZVCULTUR  No results found for: VIRALCULTU, WOUNDCX    [x]  Radiology XR Spine Lumbar Complete With Flex & Ext    Result Date: 1/15/2022  PROCEDURE: XR SPINE LUMBAR COMPLETE W FLEX EXT  COMPARISON: None  INDICATIONS: lower back pain  FINDINGS:   Levoscoliosis is present.  Disc degeneration and facet OA are present throughout the lumbar spine.  There is advanced disc degeneration at L3-L4.  No evidence of acute fracture or subluxation in the lumbar spine.  There is an IVC filter in place.  There is anterior angulation of the coccyx.  IMPRESSION: 1. Anterior angulation of the coccyx may be chronic in nature.  An acute fracture is possible if the patient has pain in this region.  2. Scoliosis and degenerative change in the lumbar spine.   DEVANTE ZAVALA MD       Electronically Signed and Approved By: DEVANTE ZAVALA MD on 1/15/2022 at 12:12             XR Ankle 3+ View Right    Result Date: 1/12/2022  PROCEDURE: XR  ANKLE 3+ VW RIGHT  COMPARISON: None  INDICATIONS: RIGHT ANKLE PAIN FROM TWISTING INJURY  FINDINGS:  No fracture.  No dislocation.  Small plantar spur.       Small plantar spur.  No acute findings      MICHAEL LERNER MD       Electronically Signed and Approved By: MICHAEL LERNER MD on 1/12/2022 at 15:03             CT Chest Without Contrast Diagnostic    Result Date: 1/12/2022  PROCEDURE: CT CHEST WO CONTRAST DIAGNOSTIC  COMPARISON: None  INDICATIONS: Pneumonia, effusion or abscess suspected  TECHNIQUE: CT images were created without the administration of contrast material.   PROTOCOL:   Standard imaging protocol performed    RADIATION:   DLP: 552mGy*cm   Automated exposure control was utilized to minimize radiation dose.  FINDINGS:  Scattered areas of predominately subpleural interstitial prominence.  There is emphysema.  There is no dense consolidation.  No pleural fluid.  No adenopathy in the chest.  No acute findings in the included upper abdomen.       Scattered areas of predominately subpleural interstitial prominence may represent subpleural scarring or possibly interstitial lung disease.  Post infectious or inflammatory change could have this appearance.  No dense consolidation.     MICHAEL LERNER MD       Electronically Signed and Approved By: MICHAEL LERNER MD on 1/12/2022 at 8:07             XR Chest 1 View    Result Date: 1/10/2022  PROCEDURE: XR CHEST 1 VW  COMPARISON: None  INDICATIONS: SOB, CHEST PAINS  FINDINGS:  Heart size is upper limits of normal.  Interstitial prominence in the mid to lower lung zones.  There is an area of more dense opacity in the lateral left lower lung zone.       Findings suspicious for pneumonia, particularly in the left lower lung zone.  Correlate with presentation.       MICHAEL LERNER MD       Electronically Signed and Approved By: MICHAEL LERNER MD on 1/10/2022 at 11:20               []  EKG/Telemetry   []  Cardiology/Vascular   []  Pathology  [x]  Old records  []   Other:    Assessment/Plan   Assessment / Plan     Assessment/Plan:  Assessment:  Left lower lobe pneumonia likely community-acquired pneumonia staph versus strep versus atypical  Acute hypoxemic respiratory failure secondary to pneumonia  COPD with acute exacerbation  Increased work of breathing  Morbid obesity  Leukocytosis  History of CHF unspecified diastolic or systolic  GERD without esophagitis  Physical weakness and debility  Difficulty ambulating  Right ankle pain secondary to misstep  Coccyx injury on imaging  Lower back pain  Scoliosis of her lumbar spine  Degenerative changes of lumbar spine     Plan:  Labs and imaging reviewed  Consulted psych for evaluation of possible personality issues versus worsening depression, anxiety  Continue prednisone 20 mg daily for 5 days then 10 mg for 5 days after  Encourage patient to participate in her care  Atarax as needed for anxiety  Continue efforts to wean oxygen  PT/OT consulted  Continue gabapentin 800 mg 3 times daily  Continue Lexapro 20 mg daily  Continue atenolol 25 mg daily  Placement pending  Out of bed to chair as much as she can tolerate, I still suspect laying in bed may be contributing to her exacerbation of chronic lumbar spine issues  Refer to pulmonology as outpatient for repeat CT scan in 3 months  Continue with Ross James twice daily  Bronchopulmonary hygiene protocol  Bronchodilator protocol  A.m. labs  Full code  VTE prophylaxis Lovenox  Clinical course to dictate further management  Discussed with nurse at the bedside  Placement when bed is available  DVT prophylaxis:  Medical DVT prophylaxis orders are present.    CODE STATUS:   Level Of Support Discussed With: Patient  Code Status (Patient has no pulse and is not breathing): CPR (Attempt to Resuscitate)  Medical Interventions (Patient has pulse or is breathing): Full Support        Electronically signed by Tripp Arzate MD, 01/17/22, 5:55 PM EST.

## 2022-01-18 VITALS
WEIGHT: 247.58 LBS | DIASTOLIC BLOOD PRESSURE: 58 MMHG | HEART RATE: 72 BPM | TEMPERATURE: 97.3 F | HEIGHT: 62 IN | SYSTOLIC BLOOD PRESSURE: 126 MMHG | BODY MASS INDEX: 45.56 KG/M2 | OXYGEN SATURATION: 95 % | RESPIRATION RATE: 20 BRPM

## 2022-01-18 LAB
ALBUMIN SERPL-MCNC: 4.1 G/DL (ref 3.5–5.2)
ALP SERPL-CCNC: 116 U/L (ref 39–117)
ALT SERPL W P-5'-P-CCNC: 55 U/L (ref 1–33)
ANION GAP SERPL CALCULATED.3IONS-SCNC: 14.2 MMOL/L (ref 5–15)
AST SERPL-CCNC: 48 U/L (ref 1–32)
BACTERIA UR QL AUTO: ABNORMAL /HPF
BASOPHILS # BLD AUTO: 0.03 10*3/MM3 (ref 0–0.2)
BASOPHILS NFR BLD AUTO: 0.2 % (ref 0–1.5)
BILIRUB CONJ SERPL-MCNC: <0.2 MG/DL (ref 0–0.3)
BILIRUB INDIRECT SERPL-MCNC: ABNORMAL MG/DL
BILIRUB SERPL-MCNC: 0.7 MG/DL (ref 0–1.2)
BILIRUB UR QL STRIP: NEGATIVE
BUN SERPL-MCNC: 62 MG/DL (ref 8–23)
BUN/CREAT SERPL: 45.3 (ref 7–25)
CALCIUM SPEC-SCNC: 9.5 MG/DL (ref 8.6–10.5)
CHLORIDE SERPL-SCNC: 95 MMOL/L (ref 98–107)
CLARITY UR: CLEAR
CO2 SERPL-SCNC: 27.8 MMOL/L (ref 22–29)
COLOR UR: YELLOW
CREAT SERPL-MCNC: 1.37 MG/DL (ref 0.57–1)
DEPRECATED RDW RBC AUTO: 45.7 FL (ref 37–54)
EOSINOPHIL # BLD AUTO: 0.04 10*3/MM3 (ref 0–0.4)
EOSINOPHIL NFR BLD AUTO: 0.3 % (ref 0.3–6.2)
ERYTHROCYTE [DISTWIDTH] IN BLOOD BY AUTOMATED COUNT: 14.7 % (ref 12.3–15.4)
GFR SERPL CREATININE-BSD FRML MDRD: 38 ML/MIN/1.73
GLUCOSE SERPL-MCNC: 125 MG/DL (ref 65–99)
GLUCOSE UR STRIP-MCNC: NEGATIVE MG/DL
HCT VFR BLD AUTO: 43.5 % (ref 34–46.6)
HGB BLD-MCNC: 14.4 G/DL (ref 12–15.9)
HGB UR QL STRIP.AUTO: NEGATIVE
IMM GRANULOCYTES # BLD AUTO: 0.21 10*3/MM3 (ref 0–0.05)
IMM GRANULOCYTES NFR BLD AUTO: 1.5 % (ref 0–0.5)
KETONES UR QL STRIP: NEGATIVE
LEUKOCYTE ESTERASE UR QL STRIP.AUTO: ABNORMAL
LYMPHOCYTES # BLD AUTO: 1.77 10*3/MM3 (ref 0.7–3.1)
LYMPHOCYTES NFR BLD AUTO: 13 % (ref 19.6–45.3)
MAGNESIUM SERPL-MCNC: 2.6 MG/DL (ref 1.6–2.4)
MCH RBC QN AUTO: 28.8 PG (ref 26.6–33)
MCHC RBC AUTO-ENTMCNC: 33.1 G/DL (ref 31.5–35.7)
MCV RBC AUTO: 87 FL (ref 79–97)
MONOCYTES # BLD AUTO: 1.44 10*3/MM3 (ref 0.1–0.9)
MONOCYTES NFR BLD AUTO: 10.6 % (ref 5–12)
NEUTROPHILS NFR BLD AUTO: 10.1 10*3/MM3 (ref 1.7–7)
NEUTROPHILS NFR BLD AUTO: 74.4 % (ref 42.7–76)
NITRITE UR QL STRIP: NEGATIVE
NRBC BLD AUTO-RTO: 0 /100 WBC (ref 0–0.2)
PH UR STRIP.AUTO: 5.5 [PH] (ref 5–8)
PHOSPHATE SERPL-MCNC: 4.4 MG/DL (ref 2.5–4.5)
PLATELET # BLD AUTO: 278 10*3/MM3 (ref 140–450)
PMV BLD AUTO: 10.5 FL (ref 6–12)
POTASSIUM SERPL-SCNC: 3.8 MMOL/L (ref 3.5–5.2)
PROT SERPL-MCNC: 7.4 G/DL (ref 6–8.5)
PROT UR QL STRIP: ABNORMAL
RBC # BLD AUTO: 5 10*6/MM3 (ref 3.77–5.28)
RBC # UR STRIP: ABNORMAL /HPF
REF LAB TEST METHOD: ABNORMAL
RENAL EPI CELLS #/AREA URNS HPF: ABNORMAL /HPF
SODIUM SERPL-SCNC: 137 MMOL/L (ref 136–145)
SP GR UR STRIP: 1.02 (ref 1–1.03)
SQUAMOUS #/AREA URNS HPF: ABNORMAL /HPF
UROBILINOGEN UR QL STRIP: ABNORMAL
WBC # UR STRIP: ABNORMAL /HPF
WBC NRBC COR # BLD: 13.59 10*3/MM3 (ref 3.4–10.8)

## 2022-01-18 PROCEDURE — 25010000002 ENOXAPARIN PER 10 MG: Performed by: FAMILY MEDICINE

## 2022-01-18 PROCEDURE — 87086 URINE CULTURE/COLONY COUNT: CPT | Performed by: FAMILY MEDICINE

## 2022-01-18 PROCEDURE — 94799 UNLISTED PULMONARY SVC/PX: CPT

## 2022-01-18 PROCEDURE — 84100 ASSAY OF PHOSPHORUS: CPT | Performed by: FAMILY MEDICINE

## 2022-01-18 PROCEDURE — 85025 COMPLETE CBC W/AUTO DIFF WBC: CPT | Performed by: FAMILY MEDICINE

## 2022-01-18 PROCEDURE — 83735 ASSAY OF MAGNESIUM: CPT | Performed by: FAMILY MEDICINE

## 2022-01-18 PROCEDURE — 81001 URINALYSIS AUTO W/SCOPE: CPT | Performed by: FAMILY MEDICINE

## 2022-01-18 PROCEDURE — 63710000001 PROMETHAZINE PER 12.5 MG: Performed by: HOSPITALIST

## 2022-01-18 PROCEDURE — 99239 HOSP IP/OBS DSCHRG MGMT >30: CPT | Performed by: INTERNAL MEDICINE

## 2022-01-18 PROCEDURE — 80076 HEPATIC FUNCTION PANEL: CPT | Performed by: FAMILY MEDICINE

## 2022-01-18 PROCEDURE — 80048 BASIC METABOLIC PNL TOTAL CA: CPT | Performed by: FAMILY MEDICINE

## 2022-01-18 PROCEDURE — 63710000001 PREDNISONE PER 1 MG: Performed by: FAMILY MEDICINE

## 2022-01-18 RX ORDER — ONDANSETRON 2 MG/ML
4 INJECTION INTRAMUSCULAR; INTRAVENOUS EVERY 6 HOURS PRN
Start: 2022-01-18 | End: 2022-01-21

## 2022-01-18 RX ORDER — BUDESONIDE 0.5 MG/2ML
0.5 INHALANT ORAL
Qty: 120 ML | Refills: 0
Start: 2022-01-18 | End: 2022-02-17

## 2022-01-18 RX ORDER — FUROSEMIDE 40 MG/1
40 TABLET ORAL DAILY
Qty: 30 TABLET | Refills: 0
Start: 2022-01-18

## 2022-01-18 RX ORDER — PREDNISONE 10 MG/1
TABLET ORAL
Qty: 14 TABLET | Refills: 0 | Status: SHIPPED | OUTPATIENT
Start: 2022-01-19 | End: 2022-02-01

## 2022-01-18 RX ORDER — ARFORMOTEROL TARTRATE 15 UG/2ML
15 SOLUTION RESPIRATORY (INHALATION)
Qty: 120 ML | Refills: 0
Start: 2022-01-18

## 2022-01-18 RX ADMIN — GABAPENTIN 800 MG: 400 CAPSULE ORAL at 12:43

## 2022-01-18 RX ADMIN — ACETAMINOPHEN 650 MG: 325 TABLET ORAL at 00:02

## 2022-01-18 RX ADMIN — SENNOSIDES AND DOCUSATE SODIUM 2 TABLET: 50; 8.6 TABLET ORAL at 08:00

## 2022-01-18 RX ADMIN — FUROSEMIDE 40 MG: 40 TABLET ORAL at 08:00

## 2022-01-18 RX ADMIN — BUDESONIDE 0.5 MG: 0.5 SUSPENSION RESPIRATORY (INHALATION) at 07:48

## 2022-01-18 RX ADMIN — ACETAMINOPHEN 650 MG: 325 TABLET ORAL at 07:59

## 2022-01-18 RX ADMIN — ARFORMOTEROL TARTRATE 15 MCG: 15 SOLUTION RESPIRATORY (INHALATION) at 07:48

## 2022-01-18 RX ADMIN — HYDROCODONE BITARTRATE AND ACETAMINOPHEN 1 TABLET: 10; 325 TABLET ORAL at 04:02

## 2022-01-18 RX ADMIN — ATENOLOL 25 MG: 25 TABLET ORAL at 08:00

## 2022-01-18 RX ADMIN — SODIUM CHLORIDE, PRESERVATIVE FREE 10 ML: 5 INJECTION INTRAVENOUS at 08:00

## 2022-01-18 RX ADMIN — ESCITALOPRAM OXALATE 20 MG: 10 TABLET ORAL at 08:00

## 2022-01-18 RX ADMIN — PREDNISONE 20 MG: 20 TABLET ORAL at 08:00

## 2022-01-18 RX ADMIN — PROMETHAZINE HYDROCHLORIDE 12.5 MG: 12.5 TABLET ORAL at 00:02

## 2022-01-18 RX ADMIN — GABAPENTIN 800 MG: 400 CAPSULE ORAL at 04:02

## 2022-01-18 RX ADMIN — ENOXAPARIN SODIUM 40 MG: 40 INJECTION SUBCUTANEOUS at 08:00

## 2022-01-18 RX ADMIN — HYDROCODONE BITARTRATE AND ACETAMINOPHEN 1 TABLET: 10; 325 TABLET ORAL at 12:44

## 2022-01-18 RX ADMIN — HYDROXYZINE HYDROCHLORIDE 25 MG: 25 TABLET, FILM COATED ORAL at 07:59

## 2022-01-18 NOTE — SIGNIFICANT NOTE
01/18/22 1223   Plan   Final Discharge Disposition Code 62 - inpatient rehab facility   Final Note Precert has been approved at [facility]. Bed available today.

## 2022-01-18 NOTE — PLAN OF CARE
Goal Outcome Evaluation:  Plan of Care Reviewed With: patient        Progress: improving     Pt awake most the night. Episodes of anxiety constantly requesting for staff to be in room. Complaining of pain and nausea requesting medications. Patient beginning to pull self up in bed and turn and reposition self. Frequency and urgency with urine. Up to bedside commode with assist x2. Requires lots of encouragement for self support care. Will continue to monitor and update as needed.

## 2022-01-18 NOTE — PROGRESS NOTES
[facility]   Hospitalist Progress Note  Date: 2022  Patient Name: Ashtyn Jean Baptiste  : 1950  MRN: [id number]  Date of admission: 1/10/2022      Subjective   Subjective   Chief complaint: Shortness of breath     Summary:  71-year-old female with history of COPD, GERD without esophagitis, CHF not otherwise specified was hospitalized on 1/10/2022 with shortness of breath symptoms, left lower lobe pneumonia, acute hypoxemic respiratory failure secondary to left lower lobe pneumonia, ruled out for COVID-19, COPD with acute exacerbation, increased work of breathing and conversational dyspnea, started on supplemental oxygen, empiric antibiotics, preliminary sputum culture showing gram-positive bacilli and cocci, final result did not grow any particular bacteria.  Back pain, cute on chronic, likely exacerbated with hospitalization being in bed, PT/OT consulted, psychiatry consulted with staff concerns of personality issues, increased call light use, attention seeking behavior. Looking for placement.     Interval follow-up:      2022    · Up in bed.  Having lunch.    · On 2L (None at home)  · No new complaints  · Discussed rehab referral pending  · Cr stable at 1.3    Review of systems:  All systems reviewed and negative except for anxiety, back pain, shortness of breath, intermittent cough.    Objective   Objective     Vitals:   Temp:  [97.3 °F (36.3 °C)-98.2 °F (36.8 °C)] 98.1 °F (36.7 °C)  Heart Rate:  [67-85] 67  Resp:  [18-24] 18  BP: (142-160)/(68-89) 149/68  Flow (L/min):  [2] 2  Physical Exam    Constitutional: Awake, alert, no acute distress morbidly obese, on 1 nasal cannula laying on her side, moving around the bed freely              Eyes: Pupils equal, sclerae anicteric, no conjunctival injection              HENT: NCAT, mucous membranes moist, sinus congestion              Neck: Supple, full range of motion              Respiratory: Air entry bilaterally, no significant rhonchi or wheezing               Cardiovascular: RRR, no murmurs, rubs, or gallops, palpable pedal pulses bilaterally              Gastrointestinal: Positive bowel sounds, soft, nontender, nondistended              Musculoskeletal: No bilateral ankle edema, no clubbing or cyanosis to extremities, palpated spine no paraspinal tenderness, no muscle knots, no bony tenderness, no tenderness to her back sides or muscles              Psychiatric: Anxious mood              Neurologic: Oriented x 3, strength symmetric in all extremities weakness throughout, Cranial Nerves grossly intact to confrontation, speech clear              Skin: No rashes       Result Review    Result Review:  I have personally reviewed the results from the time of this admission to 1/18/2022 13:17 EST and agree with these findings:  [x]  Laboratory   CBC    CBC 1/16/22 1/17/22 1/18/22   WBC 13.59 (A) 13.20 (A) 13.59 (A)   RBC 5.11 5.09 5.00   Hemoglobin 14.5 14.6 14.4   Hematocrit 43.6 43.6 43.5   MCV 85.3 85.7 87.0   MCH 28.4 28.7 28.8   MCHC 33.3 33.5 33.1   RDW 14.4 14.4 14.7   Platelets 295 272 278   (A) Abnormal value            BMP    BMP 1/16/22 1/17/22 1/18/22   BUN 55 (A) 55 (A) 62 (A)   Creatinine 1.19 (A) 1.33 (A) 1.37 (A)   Sodium 139 138 137   Potassium 3.3 (A) 3.6 3.8   Chloride 97 (A) 95 (A) 95 (A)   CO2 28.2 27.5 27.8   Calcium 9.6 9.6 9.5   (A) Abnormal value       Comments are available for some flowsheets but are not being displayed.           LIVER FUNCTION TESTS:      Lab 01/18/22  0439 01/17/22  0414 01/16/22  0451 01/15/22  1315 01/14/22  0450   TOTAL PROTEIN 7.4 7.4 7.3 7.7 7.3   ALBUMIN 4.10 4.30 4.10 4.40 4.30   ALT (SGPT) 55* 54* 51* 49* 36*   AST (SGOT) 48* 50* 56* 67* 68*   BILIRUBIN 0.7 0.7 0.6 0.6 0.4   INDIRECT BILIRUBIN  --  0.5 0.4  --   --    BILIRUBIN DIRECT <0.2 0.2 0.2 <0.2 <0.2   ALK PHOS 116 119* 121* 128* 123*       [x]  Microbiology No results found for: ACANTHNAEG, AFBCX, BPERTUSSISCX, BLOODCX  No results found for: BCIDPCR,  CXREFLEX, CSFCX, CULTURETIS  No results found for: CULTURES, HSVCX, URCX  No results found for: EYECULTURE, GCCX, HSVCULTURE, LABHSV  No results found for: LEGIONELLA, MRSACX, MUMPSCX, MYCOPLASCX  No results found for: NOCARDIACX, STOOLCX  No results found for: THROATCX, UNSTIMCULT, URINECX, CULTURE, VZVCULTUR  No results found for: VIRALCULTU, WOUNDCX    [x]  Radiology XR Spine Lumbar Complete With Flex & Ext    Result Date: 1/15/2022  PROCEDURE: XR SPINE LUMBAR COMPLETE W FLEX EXT  COMPARISON: None  INDICATIONS: lower back pain  FINDINGS:   Levoscoliosis is present.  Disc degeneration and facet OA are present throughout the lumbar spine.  There is advanced disc degeneration at L3-L4.  No evidence of acute fracture or subluxation in the lumbar spine.  There is an IVC filter in place.  There is anterior angulation of the coccyx.  IMPRESSION: 1. Anterior angulation of the coccyx may be chronic in nature.  An acute fracture is possible if the patient has pain in this region.  2. Scoliosis and degenerative change in the lumbar spine.   DEVANTE ZAVALA MD       Electronically Signed and Approved By: DEVANTE ZAVALA MD on 1/15/2022 at 12:12             XR Ankle 3+ View Right    Result Date: 1/12/2022  PROCEDURE: XR ANKLE 3+ VW RIGHT  COMPARISON: None  INDICATIONS: RIGHT ANKLE PAIN FROM TWISTING INJURY  FINDINGS:  No fracture.  No dislocation.  Small plantar spur.       Small plantar spur.  No acute findings      MICHAEL LERNER MD       Electronically Signed and Approved By: MICHAEL LERNER MD on 1/12/2022 at 15:03             CT Chest Without Contrast Diagnostic    Result Date: 1/12/2022  PROCEDURE: CT CHEST WO CONTRAST DIAGNOSTIC  COMPARISON: None  INDICATIONS: Pneumonia, effusion or abscess suspected  TECHNIQUE: CT images were created without the administration of contrast material.   PROTOCOL:   Standard imaging protocol performed    RADIATION:   DLP: 552mGy*cm   Automated exposure control was utilized to minimize  radiation dose.  FINDINGS:  Scattered areas of predominately subpleural interstitial prominence.  There is emphysema.  There is no dense consolidation.  No pleural fluid.  No adenopathy in the chest.  No acute findings in the included upper abdomen.       Scattered areas of predominately subpleural interstitial prominence may represent subpleural scarring or possibly interstitial lung disease.  Post infectious or inflammatory change could have this appearance.  No dense consolidation.     MICHAEL LERNER MD       Electronically Signed and Approved By: MICHAEL LERNER MD on 1/12/2022 at 8:07             XR Chest 1 View    Result Date: 1/10/2022  PROCEDURE: XR CHEST 1 VW  COMPARISON: None  INDICATIONS: SOB, CHEST PAINS  FINDINGS:  Heart size is upper limits of normal.  Interstitial prominence in the mid to lower lung zones.  There is an area of more dense opacity in the lateral left lower lung zone.       Findings suspicious for pneumonia, particularly in the left lower lung zone.  Correlate with presentation.       MICHAEL LERNER MD       Electronically Signed and Approved By: MICHAEL LERNER MD on 1/10/2022 at 11:20               []  EKG/Telemetry   []  Cardiology/Vascular   []  Pathology  [x]  Old records  []  Other:    Assessment/Plan   Assessment / Plan     Assessment:    Left lower lobe pneumonia (CAP)  Acute hypoxemic respiratory failure secondary to pneumonia  COPD with acute exacerbation  Morbid obesity  Leukocytosis  History of CHF unspecified  GERD without esophagitis  Physical weakness and debility  Difficulty ambulating  Right ankle pain secondary to misstep  Coccyx injury on imaging  Lower back pain  Scoliosis of her lumbar spine  Neuropathy  Degenerative changes of lumbar spine     Plan:    Placement :     [facility] today.  Out of bed.  Up to chair.  Continue prednisone 20 mg daily for 5 days then 10 mg for 5 days after  Encourage patient to participate in her care  Atarax as needed for anxiety  Continue  efforts to wean oxygen ... may need home O2 after rehab  PT/OT consulted  Continue gabapentin 800 mg 3 times daily  Continue Lexapro 20 mg daily  Continue atenolol 25 mg daily  Inactivity likely exacerbating of chronic lumbar spine issues  Refer to pulmonology as outpatient for repeat CT scan in 3 months  Continue with Ross James nebs twice daily / Bronchopulmonary hygiene protocol / Bronchodilator protocol  Full code  VTE prophylaxis Lovenox  Clinical course to dictate further management  Discussed with nurse at the bedside  Placement when bed is available  DVT prophylaxis:  Medical DVT prophylaxis orders are present.    CODE STATUS:   Level Of Support Discussed With: Patient  Code Status (Patient has no pulse and is not breathing): CPR (Attempt to Resuscitate)  Medical Interventions (Patient has pulse or is breathing): Full Support

## 2022-01-18 NOTE — DISCHARGE SUMMARY
[facility]  HOSPITALIST  DISCHARGE SUMMARY       Patient Name: Ashtyn Jean Baptiste  : 1950  MRN: [id number]  Primary Care Physician: Virgilio Morales MD    Date of Admission: 1/10/2022  Date of Discharge: 2022    Discharge Diagnoses     Left lower lobe pneumonia (CAP)  Acute hypoxemic respiratory failure secondary to pneumonia  COPD with acute exacerbation  Morbid obesity w BMI 45  ASIF (Unable to tolerate home CPAP machine)  Leukocytosis  History of CHF unspecified  GERD without esophagitis  Physical weakness and debility  Difficulty ambulating, impaired gait   Right ankle pain secondary to misstep  Coccyx injury on imaging  Lower back pain  Scoliosis of her lumbar spine  Neuropathy  Degenerative changes of lumbar spine    Hospital Course   Hospital Course:  Ashtyn Jean Baptiste is a pleasant 71 y.o. female with history of COPD, GERD, CHF unspecified, who was hospitalized on 1/10/2022 with shortness of breath symptoms and diagnoses with left lower lobe pneumonia, acute hypoxemic respiratory failure, ruled out for COVID-19, and COPD with acute exacerbation.  She was started on supplemental oxygen, empiric antibiotics, preliminary sputum culture showing gram-positive bacilli / cocci; however, the final result did not grow any particular bacteria though.  She tolerated nebs, abx, and steroids and her respiratory status improved and she was maintaining good sats on 2L (She is a former smoker who is not on any home O2).  During her hospital stay, she had acute on chronic low back pain; likely exacerbated with prolonged hospitalization and being in bed.  PT/OT consulted and referrral to rehab was made.  Additionally, psychiatry consulted with staff concerns of anxiety issues with increased call light use, attention seeking behavior.  Psychiatry did not make any med changes or recommend any inpatient psych admission.  Over time pt improved and remained medically stable.   She remains weak and needing more  rehab. She will be continued on nebulizers/bronchodilator tx and short prednisone taper.  She is now being referred to [facility] for more therapy.  She will need to see PCP after rehab.  She will need a referral to  pulm clinic to get established and also repeat imaging in 3 months.    Discharge Follow Up / Recommendations (labs, diagnostics, meds, etc):   • As above  • Refer to pulmonology as outpatient for repeat CT scan in 3 months  Consultants     Consults     Date and Time Order Name Status Description    1/16/2022  3:36 PM Inpatient Psychiatrist Consult      1/10/2022  2:40 PM Hospitalist (on-call MD unless specified)        •   On Day of Discharge   VS: Temp:  [97.3 °F (36.3 °C)-98.2 °F (36.8 °C)] 98.1 °F (36.7 °C)  Heart Rate:  [67-85] 67  Resp:  [18-24] 18  BP: (142-160)/(68-89) 149/68  Flow (L/min):  [2] 2  EXAM:  (refer to progress note from 1/18/2022)     Discharge Medications      New Medications      Instructions Start Date   arformoterol 15 MCG/2ML nebulizer solution  Commonly known as: BROVANA   15 mcg, Nebulization, 2 Times Daily - RT      budesonide 0.5 MG/2ML nebulizer solution  Commonly known as: PULMICORT   0.5 mg, Nebulization, 2 Times Daily - RT      ondansetron 2 mg/mL injection  Commonly known as: ZOFRAN   4 mg, Intravenous, Every 6 Hours PRN      predniSONE 10 MG tablet  Commonly known as: DELTASONE   Take 2 tablets by mouth Daily for 3 days, THEN 1 tablet Daily for 5 days, THEN 0.5 tablets Daily for 5 days.   Start Date: January 19, 2022        Changes to Medications      Instructions Start Date   furosemide 40 MG tablet  Commonly known as: LASIX  What changed: when to take this   40 mg, Oral, Daily         Continue These Medications      Instructions Start Date   albuterol sulfate  (90 Base) MCG/ACT inhaler  Commonly known as: PROVENTIL HFA;VENTOLIN HFA;PROAIR HFA   2 puffs, Inhalation, Every 4 Hours PRN      atenolol 25 MG tablet  Commonly known as: TENORMIN   25 mg, Oral, Daily       atorvastatin 40 MG tablet  Commonly known as: LIPITOR   40 mg, Oral, Daily      escitalopram 20 MG tablet  Commonly known as: LEXAPRO   20 mg, Oral, Daily      gabapentin 800 MG tablet  Commonly known as: NEURONTIN   800 mg, Oral, 3 Times Daily      HYDROcodone-acetaminophen  MG per tablet  Commonly known as: NORCO   1 tablet, Oral, Every 6 Hours PRN      omeprazole 20 MG capsule  Commonly known as: priLOSEC   20 mg, Oral, Daily           Procedures     Imaging   XR Spine Lumbar Complete With Flex & Ext    Result Date: 1/15/2022  PROCEDURE: XR SPINE LUMBAR COMPLETE W FLEX EXT  COMPARISON: None  INDICATIONS: lower back pain  FINDINGS:   Levoscoliosis is present.  Disc degeneration and facet OA are present throughout the lumbar spine.  There is advanced disc degeneration at L3-L4.  No evidence of acute fracture or subluxation in the lumbar spine.  There is an IVC filter in place.  There is anterior angulation of the coccyx.  IMPRESSION: 1. Anterior angulation of the coccyx may be chronic in nature.  An acute fracture is possible if the patient has pain in this region.  2. Scoliosis and degenerative change in the lumbar spine.   DEVANTE ZAVALA MD       Electronically Signed and Approved By: DEVANTE ZAVALA MD on 1/15/2022 at 12:12             XR Ankle 3+ View Right    Result Date: 1/12/2022  PROCEDURE: XR ANKLE 3+ VW RIGHT  COMPARISON: None  INDICATIONS: RIGHT ANKLE PAIN FROM TWISTING INJURY  FINDINGS:  No fracture.  No dislocation.  Small plantar spur.       Small plantar spur.  No acute findings      MICHAEL LERNER MD       Electronically Signed and Approved By: MICHAEL LERNER MD on 1/12/2022 at 15:03             CT Chest Without Contrast Diagnostic    Result Date: 1/12/2022  PROCEDURE: CT CHEST WO CONTRAST DIAGNOSTIC  COMPARISON: None  INDICATIONS: Pneumonia, effusion or abscess suspected  TECHNIQUE: CT images were created without the administration of contrast material.   PROTOCOL:   Standard imaging  protocol performed    RADIATION:   DLP: 552mGy*cm   Automated exposure control was utilized to minimize radiation dose.  FINDINGS:  Scattered areas of predominately subpleural interstitial prominence.  There is emphysema.  There is no dense consolidation.  No pleural fluid.  No adenopathy in the chest.  No acute findings in the included upper abdomen.       Scattered areas of predominately subpleural interstitial prominence may represent subpleural scarring or possibly interstitial lung disease.  Post infectious or inflammatory change could have this appearance.  No dense consolidation.     MICHAEL LERNER MD       Electronically Signed and Approved By: MICHAEL LERNER MD on 1/12/2022 at 8:07             XR Chest 1 View    Result Date: 1/10/2022  PROCEDURE: XR CHEST 1 VW  COMPARISON: None  INDICATIONS: SOB, CHEST PAINS  FINDINGS:  Heart size is upper limits of normal.  Interstitial prominence in the mid to lower lung zones.  There is an area of more dense opacity in the lateral left lower lung zone.   Findings suspicious for pneumonia, particularly in the left lower lung zone.  Correlate with presentation.       MICHAEL LERNER MD       Electronically Signed and Approved By: MICHAEL LERNER MD on 1/10/2022 at 11:20             Discharge Details   Hospital Diet:     Diet Order   Procedures   • Diet Regular     CODE STATUS:    Code Status and Medical Interventions:   Ordered at: 01/10/22 1517     Level Of Support Discussed With:    Patient     Code Status (Patient has no pulse and is not breathing):    CPR (Attempt to Resuscitate)     Medical Interventions (Patient has pulse or is breathing):    Full Support     Additional Instructions for the Follow-ups that You Need to Schedule     Discharge Follow-up with PCP   As directed       Currently Documented PCP:    Virgilio Morales MD    PCP Phone Number:    [phone]     Follow Up Details: Follow up with PCP after rehab             Discharge Disposition: Rehab Facility or Unit  (DC - External)  Pertinent  Labs   LAB RESULTS:      Lab 01/18/22 0439 01/17/22 0414 01/16/22 0451 01/15/22  0445 01/14/22  0450   WBC 13.59* 13.20* 13.59* 13.72* 14.78*   HEMOGLOBIN 14.4 14.6 14.5 14.2 13.4   HEMATOCRIT 43.5 43.6 43.6 43.8 41.9   PLATELETS 278 272 295 306 295   NEUTROS ABS 10.10* 10.29* 10.87* 10.93* 11.77*   IMMATURE GRANS (ABS) 0.21* 0.16* 0.12* 0.13* 0.08*   LYMPHS ABS 1.77 1.32 1.35 1.25 1.12   MONOS ABS 1.44* 1.38* 1.22* 1.38* 1.80*   EOS ABS 0.04 0.01 0.00 0.01 0.00   MCV 87.0 85.7 85.3 88.5 89.5         Lab 01/18/22  0439 01/17/22  0414 01/16/22  0451 01/15/22  1315 01/15/22  0445 01/14/22  0450   SODIUM 137 138 139 139  --  141   POTASSIUM 3.8 3.6 3.3* 3.7  --  3.9   CHLORIDE 95* 95* 97* 95*  --  102   CO2 27.8 27.5 28.2 28.2  --  27.4   ANION GAP 14.2 15.5* 13.8 15.8*  --  11.6   BUN 62* 55* 55* 46*  --  45*   CREATININE 1.37* 1.33* 1.19* 1.18*  --  1.11*   GLUCOSE 125* 139* 145* 149*  --  156*   CALCIUM 9.5 9.6 9.6 9.5  --  9.6   MAGNESIUM 2.6* 2.6* 2.7*  --  2.4 2.3   PHOSPHORUS 4.4 3.4 4.4  --  3.4 3.3         Lab 01/18/22  0439 01/17/22  0414 01/16/22  0451 01/15/22  1315 01/14/22  0450   TOTAL PROTEIN 7.4 7.4 7.3 7.7 7.3   ALBUMIN 4.10 4.30 4.10 4.40 4.30   ALT (SGPT) 55* 54* 51* 49* 36*   AST (SGOT) 48* 50* 56* 67* 68*   BILIRUBIN 0.7 0.7 0.6 0.6 0.4   INDIRECT BILIRUBIN  --  0.5 0.4  --   --    BILIRUBIN DIRECT <0.2 0.2 0.2 <0.2 <0.2   ALK PHOS 116 119* 121* 128* 123*         Lab 01/14/22  2149 01/14/22  0056 01/13/22  0418 01/11/22  1358   PROBNP  --   --  353.2  --    TROPONIN T <0.010 <0.010  --  <0.010                 Brief Urine Lab Results  (Last result in the past 365 days)      Color   Clarity   Blood   Leuk Est   Nitrite   Protein   CREAT   Urine HCG        01/18/22 1120 Yellow   Clear   Negative   Moderate (2+)   Negative   Trace               Microbiology Results (last 10 days)     Procedure Component Value - Date/Time    Legionella Antigen, Urine - Urine, Urine, Clean  Catch [[id number]]  (Normal) Collected: 01/11/22 0701    Lab Status: Final result Specimen: Urine, Clean Catch Updated: 01/11/22 0748     LEGIONELLA ANTIGEN, URINE Negative    S. Pneumo Ag Urine or CSF - Urine, Urine, Clean Catch [[id number]]  (Normal) Collected: 01/11/22 0701    Lab Status: Final result Specimen: Urine, Clean Catch Updated: 01/11/22 0748     Strep Pneumo Ag Negative    Mycoplasma Pneumoniae Antibody, IgM - Blood, Arm, Left [[id number]]  (Normal) Collected: 01/11/22 0504    Lab Status: Final result Specimen: Blood from Arm, Left Updated: 01/11/22 0631     Mycoplasma pneumo IgM Negative    Respiratory Culture - Sputum, Cough [[id number]] Collected: 01/10/22 2141    Lab Status: Final result Specimen: Sputum from Cough Updated: 01/13/22 0900     Respiratory Culture Scant growth (1+) Normal respiratory aline. No S. aureus or Pseudomonas aeruginosa detected. Final report.     Gram Stain Greater than 25 WBCs per low power field      Less than 10 Epithelial cells per low power field      Occasional Gram positive bacilli      Occasional Gram positive cocci    Blood Culture - Blood, Arm, Left [[id number]]  (Normal) Collected: 01/10/22 1217    Lab Status: Final result Specimen: Blood from Arm, Left Updated: 01/15/22 1230     Blood Culture No growth at 5 days    Blood Culture - Blood, Arm, Left [[id number]]  (Normal) Collected: 01/10/22 1217    Lab Status: Final result Specimen: Blood from Arm, Left Updated: 01/15/22 1230     Blood Culture No growth at 5 days    Influenza Antigen, Rapid - Swab, Nasopharynx [[id number]]  (Normal) Collected: 01/10/22 1217    Lab Status: Final result Specimen: Swab from Nasopharynx Updated: 01/10/22 1312     Influenza A Ag, EIA Negative     Influenza B Ag, EIA Negative    COVID-19,APTIMA PANTHER(JAMES),[facility], NP/OP SWAB IN UTM/VTM/[facility] MEDIA,24 HR TAT - Swab, Nasopharynx [[id number]]  (Normal) Collected: 01/10/22 1217    Lab Status: Final result Specimen: Swab from  Nasopharynx Updated: 01/10/22 1924     COVID19 Not Detected    Narrative:      Fact sheet for providers: https://www.fda.gov/media/195306/download     Fact sheet for patients: https://www.fda.gov/media/981179/download    Test performed by RT PCR.        Labs Pending at Discharge:   Pending Labs     Order Current Status    Urine Culture - Urine, Urine, Clean Catch In process        Time spent on Discharge including face to face service: > 30 minutes  Electronically signed by [employer], 01/18/22, 1:48 PM EST.       Attending Note:  I have seen and examined the patient on the day of discharge and agree with above per Ramsey Snow PA-C.  71-year-old female with multiple medical problems hospitalized 8 days ago with shortness of breath, left lower lobe pneumonia, COVID-negative.  She was managed with antibiotics and steroids and has had a nice response.  Psychiatry was consulted but had no inpatient recommendations and she seems to be doing well today.  She has been accepted to [facility] for rehabilitation and will be discharged there today in stable condition. patient to followup with primary care provider.    Condition at dc: stable for dc    Electronically signed by Alex Stephens MD, 01/18/22, 7:17 PM EST.

## 2022-01-20 LAB — BACTERIA SPEC AEROBE CULT: NORMAL

## 2022-02-28 ENCOUNTER — APPOINTMENT (OUTPATIENT)
Dept: CT IMAGING | Facility: HOSPITAL | Age: 72
End: 2022-02-28

## 2022-02-28 ENCOUNTER — HOSPITAL ENCOUNTER (EMERGENCY)
Facility: HOSPITAL | Age: 72
Discharge: HOME | End: 2022-02-28
Attending: EMERGENCY MEDICINE | Admitting: EMERGENCY MEDICINE

## 2022-02-28 VITALS
WEIGHT: 221.34 LBS | OXYGEN SATURATION: 90 % | HEIGHT: 62 IN | RESPIRATION RATE: 18 BRPM | TEMPERATURE: 97.8 F | SYSTOLIC BLOOD PRESSURE: 161 MMHG | BODY MASS INDEX: 40.73 KG/M2 | HEART RATE: 108 BPM | DIASTOLIC BLOOD PRESSURE: 97 MMHG

## 2022-02-28 DIAGNOSIS — R10.9 ABDOMINAL PAIN, UNSPECIFIED ABDOMINAL LOCATION: Primary | ICD-10-CM

## 2022-02-28 DIAGNOSIS — R19.7 DIARRHEA, UNSPECIFIED TYPE: ICD-10-CM

## 2022-02-28 DIAGNOSIS — R11.2 NON-INTRACTABLE VOMITING WITH NAUSEA, UNSPECIFIED VOMITING TYPE: ICD-10-CM

## 2022-02-28 LAB
ALBUMIN SERPL-MCNC: 4.3 G/DL (ref 3.5–5.2)
ALBUMIN/GLOB SERPL: 1.1 G/DL
ALP SERPL-CCNC: 132 U/L (ref 39–117)
ALT SERPL W P-5'-P-CCNC: 28 U/L (ref 1–33)
ANION GAP SERPL CALCULATED.3IONS-SCNC: 18 MMOL/L (ref 5–15)
AST SERPL-CCNC: 23 U/L (ref 1–32)
BACTERIA UR QL AUTO: ABNORMAL /HPF
BASOPHILS # BLD AUTO: 0.04 10*3/MM3 (ref 0–0.2)
BASOPHILS NFR BLD AUTO: 0.4 % (ref 0–1.5)
BILIRUB SERPL-MCNC: 0.9 MG/DL (ref 0–1.2)
BILIRUB UR QL STRIP: NEGATIVE
BUN SERPL-MCNC: 25 MG/DL (ref 8–23)
BUN/CREAT SERPL: 19.7 (ref 7–25)
CALCIUM SPEC-SCNC: 9.9 MG/DL (ref 8.6–10.5)
CHLORIDE SERPL-SCNC: 101 MMOL/L (ref 98–107)
CLARITY UR: CLEAR
CO2 SERPL-SCNC: 19 MMOL/L (ref 22–29)
COD CRY URNS QL: ABNORMAL /HPF
COLOR UR: YELLOW
CREAT SERPL-MCNC: 1.27 MG/DL (ref 0.57–1)
DEPRECATED RDW RBC AUTO: 49.9 FL (ref 37–54)
EGFRCR SERPLBLD CKD-EPI 2021: 45.6 ML/MIN/1.73
EOSINOPHIL # BLD AUTO: 0.02 10*3/MM3 (ref 0–0.4)
EOSINOPHIL NFR BLD AUTO: 0.2 % (ref 0.3–6.2)
ERYTHROCYTE [DISTWIDTH] IN BLOOD BY AUTOMATED COUNT: 15.6 % (ref 12.3–15.4)
GLOBULIN UR ELPH-MCNC: 3.8 GM/DL
GLUCOSE SERPL-MCNC: 110 MG/DL (ref 65–99)
GLUCOSE UR STRIP-MCNC: NEGATIVE MG/DL
GRAN CASTS URNS QL MICRO: ABNORMAL /LPF
HCT VFR BLD AUTO: 43.6 % (ref 34–46.6)
HGB BLD-MCNC: 14.8 G/DL (ref 12–15.9)
HGB UR QL STRIP.AUTO: NEGATIVE
HOLD SPECIMEN: NORMAL
HOLD SPECIMEN: NORMAL
HYALINE CASTS UR QL AUTO: ABNORMAL /LPF
IMM GRANULOCYTES # BLD AUTO: 0.04 10*3/MM3 (ref 0–0.05)
IMM GRANULOCYTES NFR BLD AUTO: 0.4 % (ref 0–0.5)
KETONES UR QL STRIP: ABNORMAL
LEUKOCYTE ESTERASE UR QL STRIP.AUTO: NEGATIVE
LIPASE SERPL-CCNC: 34 U/L (ref 13–60)
LYMPHOCYTES # BLD AUTO: 2.63 10*3/MM3 (ref 0.7–3.1)
LYMPHOCYTES NFR BLD AUTO: 23.5 % (ref 19.6–45.3)
MCH RBC QN AUTO: 29.6 PG (ref 26.6–33)
MCHC RBC AUTO-ENTMCNC: 33.9 G/DL (ref 31.5–35.7)
MCV RBC AUTO: 87.2 FL (ref 79–97)
MONOCYTES # BLD AUTO: 1.12 10*3/MM3 (ref 0.1–0.9)
MONOCYTES NFR BLD AUTO: 10 % (ref 5–12)
NEUTROPHILS NFR BLD AUTO: 65.5 % (ref 42.7–76)
NEUTROPHILS NFR BLD AUTO: 7.34 10*3/MM3 (ref 1.7–7)
NITRITE UR QL STRIP: NEGATIVE
NRBC BLD AUTO-RTO: 0 /100 WBC (ref 0–0.2)
PH UR STRIP.AUTO: 5.5 [PH] (ref 5–8)
PLATELET # BLD AUTO: 355 10*3/MM3 (ref 140–450)
PMV BLD AUTO: 9.3 FL (ref 6–12)
POTASSIUM SERPL-SCNC: 3.2 MMOL/L (ref 3.5–5.2)
PROT SERPL-MCNC: 8.1 G/DL (ref 6–8.5)
PROT UR QL STRIP: ABNORMAL
RBC # BLD AUTO: 5 10*6/MM3 (ref 3.77–5.28)
RBC # UR STRIP: ABNORMAL /HPF
REF LAB TEST METHOD: ABNORMAL
SODIUM SERPL-SCNC: 138 MMOL/L (ref 136–145)
SP GR UR STRIP: >1.03 (ref 1–1.03)
SQUAMOUS #/AREA URNS HPF: ABNORMAL /HPF
UROBILINOGEN UR QL STRIP: ABNORMAL
WBC # UR STRIP: ABNORMAL /HPF
WBC NRBC COR # BLD: 11.19 10*3/MM3 (ref 3.4–10.8)
WHOLE BLOOD HOLD SPECIMEN: NORMAL
WHOLE BLOOD HOLD SPECIMEN: NORMAL

## 2022-02-28 PROCEDURE — 85025 COMPLETE CBC W/AUTO DIFF WBC: CPT

## 2022-02-28 PROCEDURE — 96372 THER/PROPH/DIAG INJ SC/IM: CPT

## 2022-02-28 PROCEDURE — 0 IOPAMIDOL PER 1 ML: Performed by: EMERGENCY MEDICINE

## 2022-02-28 PROCEDURE — 83690 ASSAY OF LIPASE: CPT

## 2022-02-28 PROCEDURE — 81001 URINALYSIS AUTO W/SCOPE: CPT | Performed by: EMERGENCY MEDICINE

## 2022-02-28 PROCEDURE — 99283 EMERGENCY DEPT VISIT LOW MDM: CPT

## 2022-02-28 PROCEDURE — 96374 THER/PROPH/DIAG INJ IV PUSH: CPT

## 2022-02-28 PROCEDURE — 74177 CT ABD & PELVIS W/CONTRAST: CPT

## 2022-02-28 PROCEDURE — 25010000002 ONDANSETRON PER 1 MG: Performed by: EMERGENCY MEDICINE

## 2022-02-28 PROCEDURE — 80053 COMPREHEN METABOLIC PANEL: CPT

## 2022-02-28 PROCEDURE — 25010000002 DICYCLOMINE PER 20 MG: Performed by: EMERGENCY MEDICINE

## 2022-02-28 PROCEDURE — 36415 COLL VENOUS BLD VENIPUNCTURE: CPT

## 2022-02-28 RX ORDER — ONDANSETRON 8 MG/1
8 TABLET, ORALLY DISINTEGRATING ORAL EVERY 8 HOURS PRN
Qty: 30 TABLET | Refills: 0 | Status: SHIPPED | OUTPATIENT
Start: 2022-02-28 | End: 2022-03-10

## 2022-02-28 RX ORDER — SODIUM CHLORIDE 0.9 % (FLUSH) 0.9 %
10 SYRINGE (ML) INJECTION AS NEEDED
Status: DISCONTINUED | OUTPATIENT
Start: 2022-02-28 | End: 2022-03-01 | Stop reason: HOSPADM

## 2022-02-28 RX ORDER — DICYCLOMINE HYDROCHLORIDE 10 MG/ML
20 INJECTION INTRAMUSCULAR ONCE
Status: COMPLETED | OUTPATIENT
Start: 2022-02-28 | End: 2022-02-28

## 2022-02-28 RX ORDER — DICYCLOMINE HCL 20 MG
20 TABLET ORAL EVERY 6 HOURS
Qty: 28 TABLET | Refills: 0 | Status: SHIPPED | OUTPATIENT
Start: 2022-02-28 | End: 2022-03-07

## 2022-02-28 RX ORDER — ONDANSETRON 2 MG/ML
4 INJECTION INTRAMUSCULAR; INTRAVENOUS ONCE
Status: COMPLETED | OUTPATIENT
Start: 2022-02-28 | End: 2022-02-28

## 2022-02-28 RX ADMIN — ONDANSETRON 4 MG: 2 INJECTION INTRAMUSCULAR; INTRAVENOUS at 19:51

## 2022-02-28 RX ADMIN — SODIUM CHLORIDE 1000 ML: 9 INJECTION, SOLUTION INTRAVENOUS at 19:40

## 2022-02-28 RX ADMIN — DICYCLOMINE HYDROCHLORIDE 20 MG: 20 INJECTION, SOLUTION INTRAMUSCULAR at 19:51

## 2022-02-28 RX ADMIN — IOPAMIDOL 100 ML: 755 INJECTION, SOLUTION INTRAVENOUS at 20:27

## 2022-03-01 NOTE — ED PROVIDER NOTES
Time: 7:14 PM EST  Arrived by: private car  Chief Complaint: Abd pain  History provided by: Patient  History is limited by: N/A     History of Present Illness:  Patient is a 71 y.o. year old female that presents to the emergency department with mid abd pain and diarrhea for 5 days. She states the pain has been constant. She also reports N/V. She denies blood in stool, fever, dysuria, malodorous urine, fever, chills, congestion, cough, loss of taste and smell. She also denies bad food exposure.      History provided by:  Patient      Similar Symptoms Previously: N/A  Recently seen: 1/10/22 for acute respiratory failure      Patient Care Team  Primary Care Provider: Virgilio Morales MD    Past Medical History:     Allergies   Allergen Reactions   • Latex Hives   • Codeine Nausea And Vomiting   • Morphine Nausea And Vomiting     Past Medical History:   Diagnosis Date   • CHF (congestive heart failure) ([facility])    • COPD (chronic obstructive pulmonary disease) ([facility])    • GERD (gastroesophageal reflux disease)      Past Surgical History:   Procedure Laterality Date   • APPENDECTOMY     • HYSTERECTOMY       History reviewed. No pertinent family history.    Home Medications:  Prior to Admission medications    Medication Sig Start Date End Date Taking? Authorizing Provider   albuterol sulfate  (90 Base) MCG/ACT inhaler Inhale 2 puffs Every 4 (Four) Hours As Needed for Wheezing.    ProviderCarito MD   arformoterol (BROVANA) 15 MCG/2ML nebulizer solution Take 2 mL by nebulization 2 (Two) Times a Day. 1/18/22   Freddy Snow PA   atenolol (TENORMIN) 25 MG tablet Take 25 mg by mouth Daily.    ProviderCarito MD   atorvastatin (LIPITOR) 40 MG tablet Take 40 mg by mouth Daily.    Carito Dubon MD   budesonide (PULMICORT) 0.5 MG/2ML nebulizer solution Take 2 mL by nebulization 2 (Two) Times a Day for 30 days. 1/18/22 2/17/22  Freddy Snow PA   escitalopram (LEXAPRO) 20 MG tablet Take 20 mg by mouth  "Daily.    Carito Dubon MD   furosemide (LASIX) 40 MG tablet Take 1 tablet by mouth Daily. 1/18/22   Freddy Snow PA   gabapentin (NEURONTIN) 800 MG tablet Take 800 mg by mouth 3 (Three) Times a Day.    Carito Dubon MD   HYDROcodone-acetaminophen (NORCO)  MG per tablet Take 1 tablet by mouth Every 6 (Six) Hours As Needed for Moderate Pain .    Carito Dubon MD   omeprazole (priLOSEC) 20 MG capsule Take 20 mg by mouth Daily.    Carito Dubon MD        Social History:   Social History     Tobacco Use   • Smoking status: Former Smoker   • Smokeless tobacco: Never Used   Substance Use Topics   • Alcohol use: Never   • Drug use: Never         Review of Systems:  Review of Systems   Constitutional: Negative for chills, diaphoresis and fever.   HENT: Negative for congestion, ear discharge and nosebleeds.    Eyes: Negative for photophobia.   Respiratory: Negative for cough and shortness of breath.    Cardiovascular: Negative for chest pain.   Gastrointestinal: Positive for abdominal pain, diarrhea, nausea and vomiting. Negative for blood in stool.   Genitourinary: Negative for dysuria.   Musculoskeletal: Negative for back pain and neck pain.   Skin: Negative for rash.   Neurological: Negative for headaches.        Physical Exam:  /97   Pulse 108   Temp 97.8 °F (36.6 °C) (Oral)   Resp 18   Ht 157.5 cm (62\")   Wt 100 kg (221 lb 5.5 oz)   SpO2 90%   Breastfeeding No   BMI 40.48 kg/m²     Physical Exam  Vitals and nursing note reviewed.   Constitutional:       General: She is not in acute distress.     Appearance: Normal appearance. She is not ill-appearing, toxic-appearing or diaphoretic.   HENT:      Head: Normocephalic and atraumatic.      Mouth/Throat:      Mouth: Mucous membranes are moist.   Eyes:      General: No scleral icterus.     Extraocular Movements: Extraocular movements intact.   Cardiovascular:      Rate and Rhythm: Normal rate and regular rhythm.      " Pulses:           Carotid pulses are 1+ on the right side and 1+ on the left side.       Radial pulses are 1+ on the right side and 1+ on the left side.        Femoral pulses are 1+ on the right side and 1+ on the left side.       Popliteal pulses are 1+ on the right side and 1+ on the left side.        Dorsalis pedis pulses are 1+ on the right side and 1+ on the left side.        Posterior tibial pulses are 1+ on the right side and 1+ on the left side.      Heart sounds: Normal heart sounds. No murmur heard.      Pulmonary:      Effort: Pulmonary effort is normal. No accessory muscle usage, respiratory distress or retractions.      Breath sounds: Normal breath sounds. No wheezing, rhonchi or rales.   Abdominal:      General: There is distension.      Palpations: Abdomen is soft. There is no mass.      Tenderness: There is abdominal tenderness in the right lower quadrant. There is no guarding or rebound.      Comments: No rigidity. No obvious fluid wave.   Musculoskeletal:         General: Normal range of motion.      Cervical back: Normal range of motion and neck supple.      Right lower leg: No edema.      Left lower leg: No edema.   Skin:     General: Skin is warm and dry.   Neurological:      Mental Status: She is alert. Mental status is at baseline.      Comments: Neurologically intact                Medications in the Emergency Department:  Medications   sodium chloride 0.9 % flush 10 mL (has no administration in time range)   sodium chloride 0.9 % bolus 1,000 mL (0 mL Intravenous Stopped 2/28/22 2149)   ondansetron (ZOFRAN) injection 4 mg (4 mg Intravenous Given 2/1950)   dicyclomine (BENTYL) injection 20 mg (20 mg Intramuscular Given 2/1950)   iopamidol (ISOVUE-370) 76 % injection 100 mL (100 mL Intravenous Given 2/28/22 2027)        Labs  Lab Results (last 24 hours)     Procedure Component Value Units Date/Time    CBC & Differential [[id number]]  (Abnormal) Collected: 02/28/22 1548    Specimen:  Blood Updated: 02/28/22 1630    Narrative:      The following orders were created for panel order CBC & Differential.  Procedure                               Abnormality         Status                     ---------                               -----------         ------                     CBC Auto Differential[[id number]]        Abnormal            Final result                 Please view results for these tests on the individual orders.    Comprehensive Metabolic Panel [[id number]]  (Abnormal) Collected: 02/28/22 1548    Specimen: Blood Updated: 02/28/22 1659     Glucose 110 mg/dL      BUN 25 mg/dL      Creatinine 1.27 mg/dL      Sodium 138 mmol/L      Potassium 3.2 mmol/L      Chloride 101 mmol/L      CO2 19.0 mmol/L      Calcium 9.9 mg/dL      Total Protein 8.1 g/dL      Albumin 4.30 g/dL      ALT (SGPT) 28 U/L      AST (SGOT) 23 U/L      Alkaline Phosphatase 132 U/L      Total Bilirubin 0.9 mg/dL      Globulin 3.8 gm/dL      A/G Ratio 1.1 g/dL      BUN/Creatinine Ratio 19.7     Anion Gap 18.0 mmol/L      eGFR 45.6 mL/min/1.73      Comment: National Kidney Foundation and American Society of Nephrology (ASN) Task Force recommended calculation based on the Chronic Kidney Disease Epidemiology Collaboration (CKD-EPI) equation refit without adjustment for race.       Narrative:      GFR Normal >60  Chronic Kidney Disease <60  Kidney Failure <15      Lipase [[id number]]  (Normal) Collected: 02/28/22 1548    Specimen: Blood Updated: 02/28/22 1659     Lipase 34 U/L     CBC Auto Differential [[id number]]  (Abnormal) Collected: 02/28/22 1548    Specimen: Blood Updated: 02/28/22 1630     WBC 11.19 10*3/mm3      RBC 5.00 10*6/mm3      Hemoglobin 14.8 g/dL      Hematocrit 43.6 %      MCV 87.2 fL      MCH 29.6 pg      MCHC 33.9 g/dL      RDW 15.6 %      RDW-SD 49.9 fl      MPV 9.3 fL      Platelets 355 10*3/mm3      Neutrophil % 65.5 %      Lymphocyte % 23.5 %      Monocyte % 10.0 %      Eosinophil % 0.2 %      Basophil % 0.4 %       Immature Grans % 0.4 %      Neutrophils, Absolute 7.34 10*3/mm3      Lymphocytes, Absolute 2.63 10*3/mm3      Monocytes, Absolute 1.12 10*3/mm3      Eosinophils, Absolute 0.02 10*3/mm3      Basophils, Absolute 0.04 10*3/mm3      Immature Grans, Absolute 0.04 10*3/mm3      nRBC 0.0 /100 WBC     Urinalysis With Microscopic If Indicated (No Culture) - Urine, Clean Catch [[id number]]  (Abnormal) Collected: 02/28/22 2138    Specimen: Urine, Clean Catch Updated: 02/28/22 2148     Color, UA Yellow     Appearance, UA Clear     pH, UA 5.5     Specific Gravity, UA >1.030     Glucose, UA Negative     Ketones, UA 15 mg/dL (1+)     Bilirubin, UA Negative     Blood, UA Negative     Protein, UA 30 mg/dL (1+)     Leuk Esterase, UA Negative     Nitrite, UA Negative     Urobilinogen, UA 1.0 E.U./dL    Urinalysis, Microscopic Only - Urine, Clean Catch [[id number]]  (Abnormal) Collected: 02/28/22 2138    Specimen: Urine, Clean Catch Updated: 02/28/22 2233     RBC, UA None Seen /HPF      WBC, UA 0-2 /HPF      Bacteria, UA None Seen /HPF      Squamous Epithelial Cells, UA 3-6 /HPF      Hyaline Casts, UA 3-6 /LPF      Granular Casts, UA 0-2 /LPF      Calcium Oxalate Crystals, UA Small/1+ /HPF      Methodology Manual Light Microscopy           Imaging:  CT Abdomen Pelvis With Contrast    Result Date: 2/28/2022  PROCEDURE: CT ABDOMEN PELVIS W CONTRAST  COMPARISON: [facility], CT, CT CHEST WO CONTRAST DIAGNOSTIC, 1/12/2022, 7:33.  INDICATIONS: abdominal pain  PROTOCOL:   Standard imaging protocol performed    RADIATION:   DLP: 1393.6mGy*cm   Automated exposure control was utilized to minimize radiation dose. CONTRAST: 100cc Isovue 370 I.V. LABS:   eGFR: 44ml/min/1.73m2  TECHNIQUE: Axial images of the abdomen and pelvis with intravenous contrast.  ABDOMEN:  Peripheral areas of infiltrate in the lung bases have improved.  The liver, spleen, pancreas and right adrenal gland are normal.  Stable 1.2 cm left adrenal adenoma.   There is an IVC filter in place.  There is a 2 cm simple cyst in the upper pole of the left kidney.  The right kidney is normal.  There are no inflammatory changes around the gallbladder.  PELVIS:  No evidence of bowel obstruction, perforation or abscess.  No CT evidence of colitis.  There is mild colonic diverticulosis without evidence of diverticulitis.  Prior hysterectomy.  IM rods are present in the proximal femurs.  There is a screw in the proximal right femoral neck.  The abdominal aorta has a normal caliber.  Degenerative changes are present in the lumbar spine.  IMPRESSION:  No acute findings.  DEVANTE ZAVALA MD       Electronically Signed and Approved By: DEVANTE ZAVALA MD on 2/28/2022 at 20:40               Procedures:  Procedures    Progress                            Medical Decision Making:  MDM  Number of Diagnoses or Management Options  Diagnosis management comments:     The patient is resting comfortably and feels better, is alert and in no distress.  Repeat examination is unremarkable and benign; in particular, there is no discomfort at McBurney's point and there is no pulsatile mass.  The history, exam, diagnostic testing and current condition does not suggest acute appendicitis, bowel obstruction, acute cholecystitis bowel perforation, major gastrointestinal bleeding, severe diverticulitis, abdominal aortic aneurysm, mesenteric ischemia, volvulus, sepsis or other significant pathology that warrants further testing, continued ED treatment, admission for surgical evaluation at this point.  The vital signs have been stable.  The patient does not have uncontrolled pain intractable vomiting or other significant symptoms.  The patient's condition is stable and appropriate for discharge from the emergency department.  The patient will follow up with her primary care physician for serial reexamination of the abdomen tomorrow.  The patient was instructed to return should they have worsening pain,  intractable vomiting, fever or new symptoms       Amount and/or Complexity of Data Reviewed  Clinical lab tests: reviewed  Tests in the radiology section of CPT®: reviewed  Tests in the medicine section of CPT®: reviewed                 Final diagnoses:   Abdominal pain, unspecified abdominal location   Non-intractable vomiting with nausea, unspecified vomiting type   Diarrhea, unspecified type        Disposition:  ED Disposition     ED Disposition Condition Comment    Discharge Stable           Documentation assistance provided by Kiran Obregon acting as scribe for Christofer Amato DO. Information recorded by the scribe was done at my direction and has been verified and validated by me.          Kiran Obregon  02/28/22 1919       Kiran Obregon  02/28/22 1927       Christofer Amato DO  03/02/22 [id number]

## 2022-03-01 NOTE — DISCHARGE INSTRUCTIONS
Please drink clear liquids which include water, Pedialyte and Gatorade for the next 12 hours and then advance your diet very slowly    Push oral fluids    Follow-up with your doctor tomorrow for serial reexamination the abdomen    Return to emergency room for intractable pain, intractable vomiting, fever, near passing out, passing out, unusual fatigue, chest pain, shortness of breath or any new symptoms you are concerned with

## 2022-08-25 ENCOUNTER — HOSPITAL ENCOUNTER (OUTPATIENT)
Dept: GENERAL RADIOLOGY | Facility: HOSPITAL | Age: 72
Discharge: HOME | End: 2022-08-25
Admitting: NURSE PRACTITIONER

## 2022-08-25 ENCOUNTER — TRANSCRIBE ORDERS (OUTPATIENT)
Dept: ADMINISTRATIVE | Facility: HOSPITAL | Age: 72
End: 2022-08-25

## 2022-08-25 DIAGNOSIS — M47.896 OTHER OSTEOARTHRITIS OF SPINE, LUMBAR REGION: ICD-10-CM

## 2022-08-25 DIAGNOSIS — M47.896 OTHER OSTEOARTHRITIS OF SPINE, LUMBAR REGION: Primary | ICD-10-CM

## 2022-08-25 PROCEDURE — 72100 X-RAY EXAM L-S SPINE 2/3 VWS: CPT

## 2022-11-22 ENCOUNTER — TRANSCRIBE ORDERS (OUTPATIENT)
Dept: ADMINISTRATIVE | Facility: HOSPITAL | Age: 72
End: 2022-11-22

## 2022-11-28 ENCOUNTER — TRANSCRIBE ORDERS (OUTPATIENT)
Dept: ADMINISTRATIVE | Facility: HOSPITAL | Age: 72
End: 2022-11-28

## 2022-11-28 DIAGNOSIS — R74.8 ELEVATED ALKALINE PHOSPHATASE LEVEL: ICD-10-CM

## 2022-11-28 DIAGNOSIS — M54.9 MID BACK PAIN: Primary | ICD-10-CM

## 2022-11-28 DIAGNOSIS — R74.8 ELEVATED SERUM GGT LEVEL: ICD-10-CM

## 2022-12-07 ENCOUNTER — HOSPITAL ENCOUNTER (OUTPATIENT)
Dept: CT IMAGING | Facility: HOSPITAL | Age: 72
Discharge: HOME | End: 2022-12-07
Admitting: INTERNAL MEDICINE

## 2022-12-07 DIAGNOSIS — R74.8 ELEVATED ALKALINE PHOSPHATASE LEVEL: ICD-10-CM

## 2022-12-07 DIAGNOSIS — R74.8 ELEVATED SERUM GGT LEVEL: ICD-10-CM

## 2022-12-07 DIAGNOSIS — M54.9 MID BACK PAIN: ICD-10-CM

## 2022-12-07 LAB
CREAT BLDA-MCNC: 1.5 MG/DL
EGFRCR SERPLBLD CKD-EPI 2021: 37.1 ML/MIN/1.73

## 2022-12-07 PROCEDURE — 82565 ASSAY OF CREATININE: CPT

## 2022-12-07 PROCEDURE — 0 IOPAMIDOL PER 1 ML: Performed by: INTERNAL MEDICINE

## 2022-12-07 PROCEDURE — 74178 CT ABD&PLV WO CNTR FLWD CNTR: CPT

## 2022-12-07 RX ADMIN — IOPAMIDOL 100 ML: 755 INJECTION, SOLUTION INTRAVENOUS at 16:50

## 2022-12-22 ENCOUNTER — TRANSCRIBE ORDERS (OUTPATIENT)
Dept: ADMINISTRATIVE | Facility: HOSPITAL | Age: 72
End: 2022-12-22

## 2022-12-22 DIAGNOSIS — R91.8 RIGHT LOWER LOBE LUNG MASS: Primary | ICD-10-CM

## 2023-01-17 ENCOUNTER — HOSPITAL ENCOUNTER (OUTPATIENT)
Dept: CT IMAGING | Facility: HOSPITAL | Age: 73
Discharge: HOME | End: 2023-01-17
Admitting: INTERNAL MEDICINE
Payer: MEDICARE

## 2023-01-17 DIAGNOSIS — R91.8 RIGHT LOWER LOBE LUNG MASS: ICD-10-CM

## 2023-01-17 PROCEDURE — 71260 CT THORAX DX C+: CPT

## 2023-01-17 PROCEDURE — 0 IOPAMIDOL PER 1 ML: Performed by: INTERNAL MEDICINE

## 2023-01-17 RX ADMIN — IOPAMIDOL 100 ML: 755 INJECTION, SOLUTION INTRAVENOUS at 14:28

## 2023-02-08 ENCOUNTER — TRANSCRIBE ORDERS (OUTPATIENT)
Dept: ADMINISTRATIVE | Facility: HOSPITAL | Age: 73
End: 2023-02-08
Payer: MEDICARE

## 2023-02-08 DIAGNOSIS — R91.8 RIGHT LOWER LOBE LUNG MASS: Primary | ICD-10-CM

## 2023-04-03 ENCOUNTER — HOSPITAL ENCOUNTER (OUTPATIENT)
Dept: CT IMAGING | Facility: HOSPITAL | Age: 73
Discharge: HOME | End: 2023-04-03
Admitting: INTERNAL MEDICINE
Payer: MEDICARE

## 2023-04-03 DIAGNOSIS — R91.8 RIGHT LOWER LOBE LUNG MASS: ICD-10-CM

## 2023-04-03 PROCEDURE — 71250 CT THORAX DX C-: CPT

## 2023-04-06 ENCOUNTER — APPOINTMENT (OUTPATIENT)
Dept: GENERAL RADIOLOGY | Facility: HOSPITAL | Age: 73
End: 2023-04-06
Payer: MEDICARE

## 2023-04-06 ENCOUNTER — APPOINTMENT (OUTPATIENT)
Dept: CT IMAGING | Facility: HOSPITAL | Age: 73
End: 2023-04-06
Payer: MEDICARE

## 2023-04-06 ENCOUNTER — HOSPITAL ENCOUNTER (EMERGENCY)
Facility: HOSPITAL | Age: 73
Discharge: HOME | End: 2023-04-06
Attending: EMERGENCY MEDICINE | Admitting: EMERGENCY MEDICINE
Payer: MEDICARE

## 2023-04-06 VITALS
WEIGHT: 240 LBS | SYSTOLIC BLOOD PRESSURE: 144 MMHG | OXYGEN SATURATION: 94 % | RESPIRATION RATE: 18 BRPM | TEMPERATURE: 97.9 F | DIASTOLIC BLOOD PRESSURE: 66 MMHG | HEIGHT: 62 IN | BODY MASS INDEX: 44.16 KG/M2 | HEART RATE: 82 BPM

## 2023-04-06 DIAGNOSIS — S09.90XA INJURY OF HEAD, INITIAL ENCOUNTER: Primary | ICD-10-CM

## 2023-04-06 PROCEDURE — 99282 EMERGENCY DEPT VISIT SF MDM: CPT

## 2023-04-06 PROCEDURE — 73502 X-RAY EXAM HIP UNI 2-3 VIEWS: CPT

## 2023-04-06 PROCEDURE — 70450 CT HEAD/BRAIN W/O DYE: CPT

## 2023-04-06 NOTE — ED PROVIDER NOTES
Time: 7:40 AM EDT  Date of encounter:  4/6/2023  Independent Historian/Clinical History and Information was obtained by:   Patient  Chief Complaint: fall, head injury    History is limited by: N/A    History of Present Illness:  Patient is a 72 y.o. year old female who presents to the emergency department for evaluation of left side head injury resulting from a fall out of bed. Patient states she hit head on bedside table. Patient denies neck pain. Patient c/o left hip pain.      History provided by:  Patient   used: No    Fall  Mechanism of injury: fall    Injury location:  Head/neck (Left side of head)  Incident location:  Home  Fall:     Fall occurred:  From a bed    Impact surface:  Furniture (bedside table)    Point of impact:  Head (Left side)  Prior to arrival data:     Loss of consciousness: no    Associated symptoms: headaches (let side head injury)    Associated symptoms: no abdominal pain, no chest pain, no nausea, no seizures and no vomiting    Head Injury  Associated symptoms: headache (let side head injury)    Associated symptoms: no nausea, no seizures and no vomiting        Patient Care Team  Primary Care Provider: Virgilio Morales MD    Past Medical History:     Allergies   Allergen Reactions   • Latex Hives   • Codeine Nausea And Vomiting   • Morphine Nausea And Vomiting     Past Medical History:   Diagnosis Date   • CHF (congestive heart failure)    • COPD (chronic obstructive pulmonary disease)    • GERD (gastroesophageal reflux disease)      Past Surgical History:   Procedure Laterality Date   • APPENDECTOMY     • HYSTERECTOMY       History reviewed. No pertinent family history.    Home Medications:  Prior to Admission medications    Medication Sig Start Date End Date Taking? Authorizing Provider   albuterol sulfate  (90 Base) MCG/ACT inhaler Inhale 2 puffs Every 4 (Four) Hours As Needed for Wheezing.    Provider, MD Carito   arformoterol (BROVANA) 15 MCG/2ML  nebulizer solution Take 2 mL by nebulization 2 (Two) Times a Day. 1/18/22   Freddy Snow PA   atenolol (TENORMIN) 25 MG tablet Take 25 mg by mouth Daily.    Carito Dubon MD   atorvastatin (LIPITOR) 40 MG tablet Take 40 mg by mouth Daily.    Carito Dubon MD   budesonide (PULMICORT) 0.5 MG/2ML nebulizer solution Take 2 mL by nebulization 2 (Two) Times a Day for 30 days. 1/18/22 2/17/22  Freddy Snow PA   escitalopram (LEXAPRO) 20 MG tablet Take 20 mg by mouth Daily.    Carito Dubon MD   furosemide (LASIX) 40 MG tablet Take 1 tablet by mouth Daily. 1/18/22   Freddy Snow PA   gabapentin (NEURONTIN) 800 MG tablet Take 800 mg by mouth 3 (Three) Times a Day.    Carito Dubon MD   HYDROcodone-acetaminophen (NORCO)  MG per tablet Take 1 tablet by mouth Every 6 (Six) Hours As Needed for Moderate Pain .    Carito Dubon MD   omeprazole (priLOSEC) 20 MG capsule Take 20 mg by mouth Daily.    Carito Dubon MD        Social History:   Social History     Tobacco Use   • Smoking status: Former   • Smokeless tobacco: Never   Substance Use Topics   • Alcohol use: Yes     Comment: social   • Drug use: Never         Review of Systems:  Review of Systems   Constitutional: Negative for chills and fever.   HENT: Negative for congestion, rhinorrhea and sore throat.    Eyes: Negative for pain and visual disturbance.   Respiratory: Negative for apnea, cough, chest tightness and shortness of breath.    Cardiovascular: Negative for chest pain and palpitations.   Gastrointestinal: Negative for abdominal pain, diarrhea, nausea and vomiting.   Genitourinary: Negative for difficulty urinating and dysuria.   Musculoskeletal: Negative for joint swelling and myalgias.   Skin: Negative for color change.   Neurological: Positive for headaches (let side head injury). Negative for seizures.   Psychiatric/Behavioral: Negative.    All other systems reviewed and are negative.       Physical  "Exam:  /66   Pulse 82   Temp 97.9 °F (36.6 °C)   Resp 18   Ht 157.5 cm (62\")   Wt 109 kg (240 lb)   SpO2 94%   BMI 43.90 kg/m²     Physical Exam  Vitals and nursing note reviewed.   Constitutional:       General: She is not in acute distress.     Appearance: Normal appearance. She is not toxic-appearing.   HENT:      Head: Normocephalic and atraumatic.      Jaw: There is normal jaw occlusion.   Eyes:      General: Lids are normal.      Extraocular Movements: Extraocular movements intact.      Conjunctiva/sclera: Conjunctivae normal.      Pupils: Pupils are equal, round, and reactive to light.   Cardiovascular:      Rate and Rhythm: Normal rate and regular rhythm.      Pulses: Normal pulses.      Heart sounds: Normal heart sounds.   Pulmonary:      Effort: Pulmonary effort is normal. No respiratory distress.      Breath sounds: Normal breath sounds. No wheezing or rhonchi.   Abdominal:      General: Abdomen is flat.      Palpations: Abdomen is soft.      Tenderness: There is no abdominal tenderness. There is no guarding or rebound.   Musculoskeletal:         General: Normal range of motion.      Cervical back: Normal range of motion and neck supple.      Left hip: Tenderness present.      Right lower leg: No edema.      Left lower leg: No edema.   Skin:     General: Skin is warm and dry.   Neurological:      Mental Status: She is alert and oriented to person, place, and time. Mental status is at baseline.   Psychiatric:         Mood and Affect: Mood normal.                  Procedures:  Procedures      Medical Decision Making:      Comorbidities that affect care:    Acute respiratory failure    External Notes reviewed:    Hospital Discharge Summary: Patient was recently admitted to the hospital and discharged for evaluation of left lower lobe pneumonia and acute hypoxic respiratory failure.      The following orders were placed and all results were independently analyzed by me:  Orders Placed This " Encounter   Procedures   • CT Head Without Contrast   • XR Hip With or Without Pelvis 2 - 3 View Left       Medications Given in the Emergency Department:  Medications - No data to display     ED Course:         Labs:    Lab Results (last 24 hours)     ** No results found for the last 24 hours. **           Imaging:    CT Head Without Contrast    Result Date: 4/6/2023  PROCEDURE: CT HEAD WO CONTRAST  COMPARISON: None.  INDICATIONS: H/O FALL; THE PT. HIT HER LEFT ANERTIOR PARIETAL SCALP; NO LOSS OF CONSCIOUSNESS; NOT CURRENTLY ON ANTICOAGULANT MEDICATION.  PROTOCOL:   Standard CT imaging protocol performed.    RADIATION:   Total DLP: 954.4mGy*cm   MA and/or KV were/was adjusted to minimize radiation dose.    TECHNIQUE: After obtaining the patient's consent, 122CT images were obtained without non-ionic intravenous contrast material.  DISCUSSION:  A routine nonenhanced head CT was performed. No acute brain abnormality is identified. No acute intracranial hemorrhage. No acute infarction. No acute skull fracture. No midline shift or acute intracranial mass effect is seen.  Mild chronic small vessel ischemia/infarction is suspected. There are arterial calcifications. The extra-axial spaces and the ventricular system are prominent.  A large hemorrhagic scalp contusion is seen in the anterior, lateral left frontal-temporal region, as on image 23 of series 201 and adjacent images.  It is roughly estimated at about 9.3 x 2.2 cm in AP (anteroposterior) and transverse dimensions, respectively.  Mild age-indeterminate mucosal thickening involves the imaged paranasal sinuses.  No air-fluid interfaces are seen within the imaged paranasal sinuses.  No significant acute findings are seen with regard to the imaged orbits or middle ear clefts.        No acute brain abnormality is seen.  No acute intracranial hemorrhage.  No acute skull fracture.  Please see above comments for further detail.    Please note that portions of this note  were completed with a voice recognition program.  MAE GEIGER JR, MD       Electronically Signed and Approved By: MAE GEIGER JR, MD on 4/06/2023 at 7:00              XR Hip With or Without Pelvis 2 - 3 View Left    Result Date: 4/6/2023  PROCEDURE: XR HIP W OR WO PELVIS 2-3 VIEW LEFT  COMPARISON: [facility], CT, CT ABDOMEN PELVIS W WO CONTRAST, 12/07/2022, 16:42.  INDICATIONS: trauma with left hip pain  FINDINGS:   The exam is limited by portable technique and body habitus.  There are partially visualized IM rods in the femurs.  There are 2 surgical screws in the right femoral neck.  No definite fractures or dislocations are identified.  IMPRESSION: Limited exam.  No definite fractures or dislocations are identified.  Suggest a follow-up CT scan of the left hip.   DEVANTE ZAVALA MD       Electronically Signed and Approved By: DEVANTE ZAVALA MD on 4/06/2023 at 8:26                 Differential Diagnosis and Discussion:    Extremity Pain: Differential diagnosis includes but is not limited to soft tissue sprain, tendonitis, tendon injury, dislocation, fracture, deep vein thrombosis, arterial insufficiency, osteoarthritis, bursitis, and ligamentous damage.    CT scan radiology interpretation was reviewed by me.    The patient presents with an acute closed head injury. Rufus Criteria for CT scan was followed. CT of the head is required for patients with minor head injury and any one of the following (including a GCS of 15):     1.                     Headache   2.                     Vomiting   3.                     Older than 60 years   4.                     Drug or Alcohol intoxication   5.                     Persistent anterograde amnesia   6.                     Visible trauma above the clavicle   7.                     Seizure.      The CT scan of the head shows no acute intracranial abnormalities. This includes subarachnoid hemorrhage, subdural hematoma, epidural hematoma, or  calvarial fractures. The patient is neurologically intact, has a normal mental status and is ambulatory in the ED. The patient has had no seizure like activity in the ED. The vital signs have been stable.  X-ray of the hip is negative for fracture/dislocation.       MDM  Number of Diagnoses or Management Options  Injury of head, initial encounter  Diagnosis management comments: Based on the patient's presentation, which included a fall and a strike to the head and hip, I have conducted a thorough evaluation of the patient's condition. I have done a CT scan of the head and an x-ray of the hip to rule out any acute intracranial abnormalities or fractures. I have also assessed the patient's neurological status and evaluated for a possible concussion.    The CT scan of the head revealed no acute intracranial abnormalities, and the x-ray of the hip showed no fractures. The patient's neurological status is stable, and there is no evidence of a concussion at this time. Additionally, I have considered all possible differential diagnoses for acute trauma and head injury.    Based on the patient's current clinical presentation and test results, the patient is stable and suitable for discharge. We will provide the patient with instructions for home care and advise her to follow up with her primary care physician or an urgent care facility if her symptoms worsen or new symptoms arise.    We will advise the patient to rest and avoid any strenuous activity until she has fully recovered from her injuries. We will also provide the patient with a contact number for any questions or concerns she may have.    In summary, based on the patient's current clinical presentation, the CT scan of her head, and x-ray of her hip, and my assessment of her neurological status, the patient is stable and suitable for discharge. We have considered all possible differential diagnoses for acute trauma and head injury. We will provide the patient with  appropriate instructions and advise her to follow up with her primary care physician or an urgent care facility if her symptoms worsen or new symptoms arise.       Amount and/or Complexity of Data Reviewed  Clinical lab tests: ordered and reviewed  Tests in the radiology section of CPT®: reviewed  Tests in the medicine section of CPT®: ordered and reviewed  Independent visualization of images, tracings, or specimens: yes    Risk of Complications, Morbidity, and/or Mortality  Presenting problems: moderate  Management options: moderate    Patient Progress  Patient progress: stable           Patient Care Considerations:    CT EXTREMITY: I considered ordering an extremity CT, however The patient is able to move her leg against gravity with minimal pain.      Consultants/Shared Management Plan:    None    Social Determinants of Health:    Patient is independent, reliable, and has access to care.       Disposition and Care Coordination:    Discharged: The patient is suitable and stable for discharge with no need for consideration of observation or admission.    I have explained the patient´s condition, diagnoses and treatment plan based on the information available to me at this time. I have answered questions and addressed any concerns. The patient has a good  understanding of the patient´s diagnosis, condition, and treatment plan as can be expected at this point. The vital signs have been stable. The patient´s condition is stable and appropriate for discharge from the emergency department.      The patient will pursue further outpatient evaluation with the primary care physician or other designated or consulting physician as outlined in the discharge instructions. They are agreeable to this plan of care and follow-up instructions have been explained in detail. The patient has received these instructions in written format and have expressed an understanding of the discharge instructions. The patient is aware that any  significant change in condition or worsening of symptoms should prompt an immediate return to this or the closest emergency department or call to 911.  I have explained discharge medications and the need for follow up with the patient/caretakers. This was also printed in the discharge instructions. Patient was discharged with the following medications and follow up:      Medication List      No changes were made to your prescriptions during this visit.      Virgilio Morales MD  [address] [place] [address]  [phone]    In 2 days         Final diagnoses:   Injury of head, initial encounter        ED Disposition     ED Disposition   Discharge    Condition   Stable    Comment   --             This medical record created using voice recognition software.        Documentation assistance provided by Aleah Swanson acting as scribe for Sunitha Joy MD. Information recorded by the scribe was done at my direction and has been verified and validated by me.          Aleah Swanson  04/06/23 0746       Sunitha Joy MD  04/06/23 0902

## 2023-12-23 NOTE — PLAN OF CARE
Goal Outcome Evaluation:  Plan of Care Reviewed With: patient        Progress: no change  Outcome Summary: Pt up to [facility] with assist x2, PCAs called out to notify that pt had gotten very weak and legs were unstable, pt felt like she was going to fall but was able to sit back before legs gave out, [place] applied and pt educated that we won't be back up to bedside until re-evaluation by PT for strengthing, will continue to monitor   Ice

## 2024-01-31 ENCOUNTER — TRANSCRIBE ORDERS (OUTPATIENT)
Dept: ADMINISTRATIVE | Facility: HOSPITAL | Age: 74
End: 2024-01-31
Payer: MEDICARE

## 2024-01-31 DIAGNOSIS — Z12.31 SCREENING MAMMOGRAM, ENCOUNTER FOR: Primary | ICD-10-CM

## 2024-02-08 ENCOUNTER — HOSPITAL ENCOUNTER (OUTPATIENT)
Dept: MAMMOGRAPHY | Facility: HOSPITAL | Age: 74
Discharge: HOME | End: 2024-02-08
Admitting: INTERNAL MEDICINE
Payer: MEDICARE

## 2024-02-08 DIAGNOSIS — Z12.31 SCREENING MAMMOGRAM, ENCOUNTER FOR: ICD-10-CM

## 2024-02-08 PROCEDURE — 77067 SCR MAMMO BI INCL CAD: CPT

## 2024-02-08 PROCEDURE — 77063 BREAST TOMOSYNTHESIS BI: CPT

## 2024-02-13 ENCOUNTER — TRANSCRIBE ORDERS (OUTPATIENT)
Dept: ADMINISTRATIVE | Facility: HOSPITAL | Age: 74
End: 2024-02-13
Payer: MEDICARE

## 2024-02-13 DIAGNOSIS — R92.8 ABNORMAL MAMMOGRAM: Primary | ICD-10-CM

## 2024-02-18 ENCOUNTER — HOSPITAL ENCOUNTER (EMERGENCY)
Facility: HOSPITAL | Age: 74
Discharge: HOME | End: 2024-02-18
Attending: EMERGENCY MEDICINE | Admitting: EMERGENCY MEDICINE
Payer: MEDICARE

## 2024-02-18 VITALS
DIASTOLIC BLOOD PRESSURE: 63 MMHG | HEART RATE: 74 BPM | SYSTOLIC BLOOD PRESSURE: 128 MMHG | OXYGEN SATURATION: 94 % | WEIGHT: 240.3 LBS | BODY MASS INDEX: 44.22 KG/M2 | HEIGHT: 62 IN | RESPIRATION RATE: 22 BRPM | TEMPERATURE: 98.1 F

## 2024-02-18 DIAGNOSIS — B37.89 CANDIDIASIS OF BREAST: Primary | ICD-10-CM

## 2024-02-18 PROCEDURE — 99283 EMERGENCY DEPT VISIT LOW MDM: CPT

## 2024-02-18 RX ORDER — FLUCONAZOLE 150 MG/1
150 TABLET ORAL ONCE
Qty: 1 TABLET | Refills: 0 | Status: SHIPPED | OUTPATIENT
Start: 2024-02-18 | End: 2024-02-18

## 2024-02-18 RX ORDER — CLOTRIMAZOLE AND BETAMETHASONE DIPROPIONATE 10; .64 MG/G; MG/G
1 CREAM TOPICAL 2 TIMES DAILY
Qty: 45 G | Refills: 0 | Status: SHIPPED | OUTPATIENT
Start: 2024-02-18

## 2024-02-18 RX ORDER — FLUCONAZOLE 100 MG/1
100 TABLET ORAL ONCE
Status: COMPLETED | OUTPATIENT
Start: 2024-02-18 | End: 2024-02-18

## 2024-02-18 RX ADMIN — FLUCONAZOLE 100 MG: 100 TABLET ORAL at 13:04

## 2024-02-18 NOTE — ED PROVIDER NOTES
Time: 12:34 PM EST  Date of encounter:  2/18/2024  Independent Historian/Clinical History and Information was obtained by:   Patient    History is limited by: N/A    Chief Complaint: Rash      History of Present Illness:  Patient is a 73 y.o. year old female who presents to the emergency department for evaluation of rash.  Patient presents to the emergency department today for evaluation of rashes under her bilateral breast.  Patient states the rash started on 2-8-2024.  Patient states that she has been putting some cream on it at home but she is not sure what the cream is.  Patient states she has had some clear drainage from the rash and is very pruritic.  Patient states she is concerned that the rash will spread because she feels like it is expanding towards her sides.  Patient denies chest pain, shortness of breath, fever.  No other complaints.    HPI    Patient Care Team  Primary Care Provider: Virgilio Morales MD    Past Medical History:     Allergies   Allergen Reactions    Latex Hives    Codeine Nausea And Vomiting    Morphine Nausea And Vomiting     Past Medical History:   Diagnosis Date    CHF (congestive heart failure)     COPD (chronic obstructive pulmonary disease)     GERD (gastroesophageal reflux disease)      Past Surgical History:   Procedure Laterality Date    APPENDECTOMY      HYSTERECTOMY       History reviewed. No pertinent family history.    Home Medications:  Prior to Admission medications    Medication Sig Start Date End Date Taking? Authorizing Provider   albuterol sulfate  (90 Base) MCG/ACT inhaler Inhale 2 puffs Every 4 (Four) Hours As Needed for Wheezing.    ProviderCarito MD   arformoterol (BROVANA) 15 MCG/2ML nebulizer solution Take 2 mL by nebulization 2 (Two) Times a Day. 1/18/22   Freddy Snow PA   atenolol (TENORMIN) 25 MG tablet Take 25 mg by mouth Daily.    ProviderCarito MD   atorvastatin (LIPITOR) 40 MG tablet Take 40 mg by mouth Daily.    Provider  "MD Carito   budesonide (PULMICORT) 0.5 MG/2ML nebulizer solution Take 2 mL by nebulization 2 (Two) Times a Day for 30 days. 1/18/22 2/17/22  Freddy Snow PA   escitalopram (LEXAPRO) 20 MG tablet Take 20 mg by mouth Daily.    Carito Dubon MD   furosemide (LASIX) 40 MG tablet Take 1 tablet by mouth Daily. 1/18/22   Freddy Snow PA   gabapentin (NEURONTIN) 800 MG tablet Take 800 mg by mouth 3 (Three) Times a Day.    Carito Dubon MD   HYDROcodone-acetaminophen (NORCO)  MG per tablet Take 1 tablet by mouth Every 6 (Six) Hours As Needed for Moderate Pain .    Carito Dubon MD   omeprazole (priLOSEC) 20 MG capsule Take 20 mg by mouth Daily.    Carito Dubon MD        Social History:   Social History     Tobacco Use    Smoking status: Former    Smokeless tobacco: Never   Substance Use Topics    Alcohol use: Yes     Comment: social    Drug use: Never         Review of Systems:  Review of Systems   Constitutional:  Negative for fever.   Respiratory:  Negative for shortness of breath.    Cardiovascular:  Negative for chest pain.   Skin:  Positive for rash.        Physical Exam:  /63 (BP Location: Right arm, Patient Position: Sitting)   Pulse 74   Temp 98.1 °F (36.7 °C) (Oral)   Resp 22   Ht 157.5 cm (62\")   Wt 109 kg (240 lb 4.8 oz)   SpO2 94%   BMI 43.95 kg/m²     Physical Exam  Vitals and nursing note reviewed.   Constitutional:       Appearance: Normal appearance.   HENT:      Head: Normocephalic and atraumatic.      Nose: Nose normal.   Eyes:      Extraocular Movements: Extraocular movements intact.      Conjunctiva/sclera: Conjunctivae normal.      Pupils: Pupils are equal, round, and reactive to light.   Cardiovascular:      Rate and Rhythm: Normal rate and regular rhythm.      Heart sounds: Normal heart sounds.   Pulmonary:      Effort: Pulmonary effort is normal.      Breath sounds: Normal breath sounds.   Abdominal:      General: Abdomen is flat. " There is no distension.   Musculoskeletal:         General: Normal range of motion.      Cervical back: Normal range of motion and neck supple.   Skin:     General: Skin is warm and dry.      Findings: Rash (Submammary candidiasis rash) present.   Neurological:      General: No focal deficit present.      Mental Status: She is alert and oriented to person, place, and time.   Psychiatric:         Mood and Affect: Mood normal.         Behavior: Behavior normal.         Thought Content: Thought content normal.         Judgment: Judgment normal.                Procedures:  Procedures      Medical Decision Making:    Comorbidities that affect care:    COPD, CHF, GERD    External Notes reviewed:    Previous Radiological Studies: Mammogram completed on 2-8-2024      The following orders were placed and all results were independently analyzed by me:  No orders of the defined types were placed in this encounter.      Medications Given in the Emergency Department:  Medications   fluconazole (DIFLUCAN) tablet 100 mg (has no administration in time range)        ED Course:    ED Course as of 02/18/24 1255   Sun Feb 18, 2024   1234 Note after patient ambulated to the room she was hypoxic.  Patient only wears oxygen supplementation at night.  Placed on 2 L nasal cannula once placed in room. [MD]   1253 Went back in patient's room and remove nasal cannula.  Had discussion with her over diagnoses and treatment and her oxygen remained at 94%. [MD]      ED Course User Index  [MD] Luan Colunga, PA-C       Labs:    Lab Results (last 24 hours)       ** No results found for the last 24 hours. **             Imaging:    No Radiology Exams Resulted Within Past 24 Hours      Differential Diagnosis and Discussion:    Rash: Differential diagnosis includes but is not limited to sepsis, cellulitis, [place] Spotted Fever, meningitis, meningococcemia, Varicella, Strep infection, dermatitis, allergic reaction, Lyme disease, and toxic  shock syndrome.      MDM  Number of Diagnoses or Management Options  Diagnosis management comments: Patient presented to the emergency department today for evaluation of rash.  On physical exam there is candidiasis rash on bilateral breast.  I will discharge patient home with clotrimazole cream but I will also provide p.o. fluconazole    Risk of Complications, Morbidity, and/or Mortality  Presenting problems: moderate  Diagnostic procedures: low  Management options: low    Patient Progress  Patient progress: stable       Patient Care Considerations:    LABS: I considered ordering labs, however there is no sign of systemic infection      Consultants/Shared Management Plan:    None    Social Determinants of Health:    Patient is independent, reliable, and has access to care.       Disposition and Care Coordination:    Discharged: The patient is suitable and stable for discharge with no need for consideration of admission.    I have explained the patient´s condition, diagnoses and treatment plan based on the information available to me at this time. I have answered questions and addressed any concerns. The patient has a good  understanding of the patient´s diagnosis, condition, and treatment plan as can be expected at this point. The vital signs have been stable. The patient´s condition is stable and appropriate for discharge from the emergency department.      The patient will pursue further outpatient evaluation with the primary care physician or other designated or consulting physician as outlined in the discharge instructions. They are agreeable to this plan of care and follow-up instructions have been explained in detail. The patient has received these instructions in written format and has expressed an understanding of the discharge instructions. The patient is aware that any significant change in condition or worsening of symptoms should prompt an immediate return to this or the closest emergency department or  call to 911.  I have explained discharge medications and the need for follow up with the patient/caretakers. This was also printed in the discharge instructions. Patient was discharged with the following medications and follow up:      Medication List      No changes were made to your prescriptions during this visit.      Virgilio Morales MD  [address] [place], [place] [id number] [phone]             Final diagnoses:   Candidiasis of breast        ED Disposition       ED Disposition   Discharge    Condition   Stable    Comment   --               This medical record created using voice recognition software.             Luan Colunga PA-C  02/18/24 1169

## 2024-02-18 NOTE — DISCHARGE INSTRUCTIONS
Utilize clotrimazole cream twice daily as prescribed.  Please take the second dose of Diflucan on Tuesday afternoon.  If these medications are not improving your rash in 5 to 6 days please follow-up with your primary care provider.  Ensure that you are keeping the area of rash clean and dry.

## 2024-03-01 ENCOUNTER — HOSPITAL ENCOUNTER (OUTPATIENT)
Dept: ULTRASOUND IMAGING | Facility: HOSPITAL | Age: 74
Discharge: HOME | End: 2024-03-01
Payer: MEDICARE

## 2024-03-01 ENCOUNTER — HOSPITAL ENCOUNTER (OUTPATIENT)
Dept: MAMMOGRAPHY | Facility: HOSPITAL | Age: 74
Discharge: HOME | End: 2024-03-01
Payer: MEDICARE

## 2024-03-01 DIAGNOSIS — R92.8 ABNORMAL MAMMOGRAM: ICD-10-CM

## 2024-03-01 PROCEDURE — 77065 DX MAMMO INCL CAD UNI: CPT

## 2024-03-01 PROCEDURE — 76642 ULTRASOUND BREAST LIMITED: CPT

## 2024-03-01 PROCEDURE — G0279 TOMOSYNTHESIS, MAMMO: HCPCS

## 2024-03-14 ENCOUNTER — TRANSCRIBE ORDERS (OUTPATIENT)
Dept: ADMINISTRATIVE | Facility: HOSPITAL | Age: 74
End: 2024-03-14
Payer: MEDICARE

## 2024-03-14 DIAGNOSIS — R92.8 ABNORMALITY OF LEFT BREAST ON SCREENING MAMMOGRAM: Primary | ICD-10-CM

## 2024-05-15 NOTE — PROGRESS NOTES
"Chief Complaint      Reflux, epigastric pain     History of Present Illness      Ashtyn Jean Baptiste is a 73 y.o. female who presents to [facility] as a new patient with a history of reflux, epigastric pain.  Patient reports reflux for many years and is currently on omeprazole 40 mg daily.  Patient reports when she was seen by her primary care provider they pushed on her epigastric area which caused pain but this has since resolved.  Patient reports bowel movements are normal occurring daily.  Patient reports that she is on oxygen at night.  Patient reports having a colonoscopy performed within the past 1 to 2 years and reported this is normal but is unsure where she had the colonoscopy performed.  Patient denies fever, nausea, vomiting, weight loss, night sweats, melena, hematochezia, hematemesis.    Colonoscopy: Review of the patient's most recent colonoscopy performed by Dr. Khan on [id number].  Reported as normal.    Most recent labs- [id number]    Results       Result Review :   The following data was reviewed by: ROSA Cuevas on 05/16/2024       CBC          10/2/2023    15:30   CBC   WBC 10.51       RBC 4.57       Hemoglobin 13.3       Hematocrit 42.8       MCV 93.7       MCH 29.1       MCHC 31.1       RDW 14.0       Platelets 286          Details          This result is from an external source.               Iron Profile No results found for: \"IRON\", \"TIBC\", \"LABIRON\", \"TRANSFERRIN\"  Ferritin No results found for: \"FERRITIN\"         Past Medical History       Past Medical History:   Diagnosis Date    CHF (congestive heart failure)     COPD (chronic obstructive pulmonary disease)     GERD (gastroesophageal reflux disease)        Past Surgical History:   Procedure Laterality Date    APPENDECTOMY      COLONOSCOPY      HYSTERECTOMY           Current Outpatient Medications:     albuterol sulfate  (90 Base) MCG/ACT inhaler, Inhale 2 puffs Every 4 (Four) Hours As Needed for " "Wheezing., Disp: , Rfl:     arformoterol (BROVANA) 15 MCG/2ML nebulizer solution, Take 2 mL by nebulization 2 (Two) Times a Day., Disp: 120 mL, Rfl: 0    atenolol (TENORMIN) 25 MG tablet, Take 1 tablet by mouth Daily., Disp: , Rfl:     atorvastatin (LIPITOR) 40 MG tablet, Take 1 tablet by mouth Daily., Disp: , Rfl:     clotrimazole-betamethasone (LOTRISONE) 1-0.05 % cream, Apply 1 Application topically to the appropriate area as directed 2 (Two) Times a Day., Disp: 45 g, Rfl: 0    escitalopram (LEXAPRO) 20 MG tablet, Take 1 tablet by mouth Daily., Disp: , Rfl:     ezetimibe (ZETIA) 10 MG tablet, Take 1 tablet by mouth Daily., Disp: , Rfl:     furosemide (LASIX) 40 MG tablet, Take 1 tablet by mouth Daily., Disp: 30 tablet, Rfl: 0    gabapentin (NEURONTIN) 800 MG tablet, Take 1 tablet by mouth 3 (Three) Times a Day., Disp: , Rfl:     Lidocaine (ZTlido) 1.8 %, ZTlido 1.8 % topical patch  APPLY 1 PATCH BY TOPICAL ROUTE ONCE DAILY (MAY WEAR UP TO 12HOURS.), Disp: , Rfl:     omeprazole (priLOSEC) 40 MG capsule, Take 1 capsule by mouth Daily., Disp: , Rfl:     oxyCODONE-acetaminophen (PERCOCET) 7.5-325 MG per tablet, Take 1 tablet by mouth Every 8 (Eight) Hours As Needed., Disp: , Rfl:     traZODone (DESYREL) 50 MG tablet, , Disp: , Rfl:     budesonide (PULMICORT) 0.5 MG/2ML nebulizer solution, Take 2 mL by nebulization 2 (Two) Times a Day for 30 days., Disp: 120 mL, Rfl: 0     Allergies   Allergen Reactions    Latex Hives    Avocado Nausea And Vomiting    Codeine Nausea And Vomiting    Morphine Nausea And Vomiting       Family History   Problem Relation Age of Onset    Esophageal cancer Brother     Colon cancer Neg Hx         Social History     Social History Narrative    Not on file       Objective       Objective     Vital Signs:   /53 (BP Location: Left arm, Patient Position: Sitting, Cuff Size: Large Adult)   Pulse 71   Ht 157.5 cm (62\")   Wt 116 kg (254 lb 12.8 oz)   SpO2 (!) 88%   BMI 46.60 kg/m²     Body " mass index is 46.6 kg/m².    Physical Exam  Constitutional:       General: She is not in acute distress.     Appearance: Normal appearance. She is well-developed. She is obese.   Eyes:      Conjunctiva/sclera: Conjunctivae normal.      Pupils: Pupils are equal, round, and reactive to light.      Visual Fields: Right eye visual fields normal and left eye visual fields normal.   Cardiovascular:      Rate and Rhythm: Normal rate and regular rhythm.      Heart sounds: Normal heart sounds.   Pulmonary:      Effort: No retractions.      Breath sounds: Normal breath sounds and air entry.      Comments: Inspection of chest: normal appearance, increased work of breathing.  Patient requires oxygen.  Abdominal:      General: Bowel sounds are normal.      Palpations: Abdomen is soft.      Tenderness: There is no abdominal tenderness.      Comments: No appreciable hepatosplenomegaly   Musculoskeletal:      Cervical back: Neck supple.      Right lower leg: No edema.      Left lower leg: No edema.   Lymphadenopathy:      Cervical: No cervical adenopathy.   Skin:     Findings: No lesion.      Comments: Turgor normal   Neurological:      Mental Status: She is alert and oriented to person, place, and time.   Psychiatric:         Mood and Affect: Mood and affect normal.              Assessment & Plan          Assessment and Plan    Diagnoses and all orders for this visit:    1. Epigastric pain (Primary)    2. Gastroesophageal reflux disease, unspecified whether esophagitis present    3. Oxygen dependent      73-year-old female presenting the office today with a history of epigastric pain, reflux, oxygen dependent.  I have recommended that the patient undergo further evaluation with an EGD.  I have discussed this procedure in detail with the patient.  I have discussed the risks, benefits and alternatives.  I have discussed the risk of anesthesia, bleeding and perforation.  Patient understands these risks, benefits and alternatives and  wishes to defer at this time.  I have also offered to order a CT scan of the abdomen patient wishes to defer at this time.  Patient will maintain follow-up with primary care we will follow-up on an as-needed basis.  Patient agreeable to this plan will call with any questions or concerns.        Follow Up       Follow Up   Return if symptoms worsen or fail to improve.  Patient was given instructions and counseling regarding her condition or for health maintenance advice. Please see specific information pulled into the AVS if appropriate.

## 2024-05-16 ENCOUNTER — OFFICE VISIT (OUTPATIENT)
Dept: GASTROENTEROLOGY | Facility: CLINIC | Age: 74
End: 2024-05-16
Payer: MEDICARE

## 2024-05-16 VITALS
DIASTOLIC BLOOD PRESSURE: 53 MMHG | OXYGEN SATURATION: 88 % | SYSTOLIC BLOOD PRESSURE: 124 MMHG | BODY MASS INDEX: 46.89 KG/M2 | WEIGHT: 254.8 LBS | HEIGHT: 62 IN | HEART RATE: 71 BPM

## 2024-05-16 DIAGNOSIS — K21.9 GASTROESOPHAGEAL REFLUX DISEASE, UNSPECIFIED WHETHER ESOPHAGITIS PRESENT: ICD-10-CM

## 2024-05-16 DIAGNOSIS — R10.13 EPIGASTRIC PAIN: Primary | ICD-10-CM

## 2024-05-16 DIAGNOSIS — Z99.81 OXYGEN DEPENDENT: ICD-10-CM

## 2024-05-16 RX ORDER — EZETIMIBE 10 MG/1
10 TABLET ORAL DAILY
COMMUNITY

## 2024-05-16 RX ORDER — OXYCODONE AND ACETAMINOPHEN 7.5; 325 MG/1; MG/1
1 TABLET ORAL EVERY 8 HOURS PRN
COMMUNITY

## 2024-05-16 RX ORDER — OMEPRAZOLE 40 MG/1
40 CAPSULE, DELAYED RELEASE ORAL DAILY
COMMUNITY

## 2024-05-16 RX ORDER — LIDOCAINE 36 MG/1
PATCH TOPICAL
COMMUNITY

## 2024-05-16 RX ORDER — TRAZODONE HYDROCHLORIDE 50 MG/1
TABLET ORAL
COMMUNITY

## 2024-05-16 NOTE — PATIENT INSTRUCTIONS
Food Choices for Gastroesophageal Reflux Disease, Adult  When you have gastroesophageal reflux disease (GERD), the foods you eat and your eating habits are very important. Choosing the right foods can help ease your discomfort. Think about working with a food expert (dietitian) to help you make good choices.  What are tips for following this plan?  Reading food labels  Look for foods that are low in saturated fat. Foods that may help with your symptoms include:  Foods that have less than 5% of daily value (DV) of fat.  Foods that have 0 grams of trans fat.  Cooking  Do not molina your food.  Cook your food by baking, steaming, grilling, or broiling. These are all methods that do not need a lot of fat for cooking.  To add flavor, try to use herbs that are low in spice and acidity.  Meal planning    Choose healthy foods that are low in fat, such as:  Fruits and vegetables.  Whole grains.  Low-fat dairy products.  Lean meats, fish, and poultry.  Eat small meals often instead of eating 3 large meals each day. Eat your meals slowly in a place where you are relaxed. Avoid bending over or lying down until 2-3 hours after eating.  Limit high-fat foods such as fatty meats or fried foods.  Limit your intake of fatty foods, such as oils, butter, and shortening.  Avoid the following as told by your doctor:  Foods that cause symptoms. These may be different for different people. Keep a food diary to keep track of foods that cause symptoms.  Alcohol.  Drinking a lot of liquid with meals.  Eating meals during the 2-3 hours before bed.  Lifestyle  Stay at a healthy weight. Ask your doctor what weight is healthy for you. If you need to lose weight, work with your doctor to do so safely.  Exercise for at least 30 minutes on 5 or more days each week, or as told by your doctor.  Wear loose-fitting clothes.  Do not smoke or use any products that contain nicotine or tobacco. If you need help quitting, ask your doctor.  Sleep with the head  of your bed higher than your feet. Use a wedge under the mattress or blocks under the bed frame to raise the head of the bed.  Chew sugar-free gum after meals.  What foods should eat?    Eat a healthy, well-balanced diet of fruits, vegetables, whole grains, low-fat dairy products, lean meats, fish, and poultry. Each person is different.  Foods that may cause symptoms in one person may not cause any symptoms in another person. Work with your doctor to find foods that are safe for you.  The items listed above may not be a complete list of what you can eat and drink. Contact a food expert for more options.  What foods should I avoid?  Limiting some of these foods may help in managing the symptoms of GERD. Everyone is different. Talk with a food expert or your doctor to help you find the exact foods to avoid, if any.  Fruits  Any fruits prepared with added fat. Any fruits that cause symptoms. For some people, this may include citrus fruits, such as oranges, grapefruit, pineapple, and rudy.  Vegetables  Deep-fried vegetables. French fries. Any vegetables prepared with added fat. Any vegetables that cause symptoms. For some people, this may include tomatoes and tomato products, chili peppers, onions and garlic, and horseradish.  Grains  Pastries or quick breads with added fat.  Meats and other proteins  High-fat meats, such as fatty beef or pork, hot dogs, ribs, ham, sausage, salami, and marin. Fried meat or protein, including fried fish and fried chicken. Nuts and nut butters, in large amounts.  Dairy  Whole milk and chocolate milk. Sour cream. Cream. Ice cream. Cream cheese. Milkshakes.  Fats and oils  Butter. Margarine. Shortening. Ghee.  Beverages  Coffee and tea, with or without caffeine. Carbonated beverages. Sodas. Energy drinks. Fruit juice made with acidic fruits, such as orange or grapefruit. Tomato juice. Alcoholic drinks.  Sweets and desserts  Chocolate and cocoa. Donuts.  Seasonings and condiments  Pepper.  Peppermint and spearmint. Added salt. Any condiments, herbs, or seasonings that cause symptoms. For some people, this may include crouch, hot sauce, or vinegar-based salad dressings.  The items listed above may not be a complete list of what you should not eat and drink. Contact a food expert for more options.  Questions to ask your doctor  Diet and lifestyle changes are often the first steps that are taken to manage symptoms of GERD. If diet and lifestyle changes do not help, talk with your doctor about taking medicines.  Where to find more information  [employer]: aboutgerd.org  Summary  When you have GERD, food and lifestyle choices are very important in easing your symptoms.  Eat small meals often instead of 3 large meals a day. Eat your meals slowly and in a place where you are relaxed.  Avoid bending over or lying down until 2-3 hours after eating.  Limit high-fat foods such as fatty meats or fried foods.  This information is not intended to replace advice given to you by your health care provider. Make sure you discuss any questions you have with your health care provider.  Document Revised: 06/28/2021 Document Reviewed: 06/28/2021  Elsevier Patient Education © 2024 [employer]  Gastroesophageal Reflux Disease, Adult  Gastroesophageal reflux (JACKLYN) happens when acid from the stomach flows up into the tube that connects the mouth and the stomach (esophagus). Normally, food travels down the esophagus and stays in the stomach to be digested. However, when a person has JACKLYN, food and stomach acid sometimes move back up into the esophagus. If this becomes a more serious problem, the person may be diagnosed with a disease called gastroesophageal reflux disease (GERD). GERD occurs when the reflux:  Happens often.  Causes frequent or severe symptoms.  Causes problems such as damage to the esophagus.  When stomach acid comes in contact with the esophagus, the acid may cause  inflammation in the esophagus. Over time, GERD may create small holes (ulcers) in the lining of the esophagus.  What are the causes?  This condition is caused by a problem with the muscle between the esophagus and the stomach (lower esophageal sphincter, or LES). Normally, the LES muscle closes after food passes through the esophagus to the stomach. When the LES is weakened or abnormal, it does not close properly, and that allows food and stomach acid to go back up into the esophagus.  The LES can be weakened by certain dietary substances, medicines, and medical conditions, including:  Tobacco use.  Pregnancy.  Having a hiatal hernia.  Alcohol use.  Certain foods and beverages, such as coffee, chocolate, onions, and peppermint.  What increases the risk?  You are more likely to develop this condition if you:  Have an increased body weight.  Have a connective tissue disorder.  Take NSAIDs, such as ibuprofen.  What are the signs or symptoms?  Symptoms of this condition include:  Heartburn.  Difficult or painful swallowing and the feeling of having a lump in the throat.  A bitter taste in the mouth.  Bad breath and having a large amount of saliva.  Having an upset or bloated stomach and belching.  Chest pain. Different conditions can cause chest pain. Make sure you see your health care provider if you experience chest pain.  Shortness of breath or wheezing.  Ongoing (chronic) cough or a nighttime cough.  Wearing away of tooth enamel.  Weight loss.  How is this diagnosed?  This condition may be diagnosed based on a medical history and a physical exam. To determine if you have mild or severe GERD, your health care provider may also monitor how you respond to treatment. You may also have tests, including:  A test to examine your stomach and esophagus with a small camera (endoscopy).  A test that measures the acidity level in your esophagus.  A test that measures how much pressure is on your esophagus.  A barium swallow or  modified barium swallow test to show the shape, size, and functioning of your esophagus.  How is this treated?  Treatment for this condition may vary depending on how severe your symptoms are. Your health care provider may recommend:  Changes to your diet.  Medicine.  Surgery.  The goal of treatment is to help relieve your symptoms and to prevent complications.  Follow these instructions at home:  Eating and drinking    Follow a diet as recommended by your health care provider. This may involve avoiding foods and drinks such as:  Coffee and tea, with or without caffeine.  Drinks that contain alcohol.  Energy drinks and sports drinks.  Carbonated drinks or sodas.  Chocolate and cocoa.  Peppermint and mint flavorings.  Garlic and onions.  Horseradish.  Spicy and acidic foods, including peppers, chili powder, crouch powder, vinegar, hot sauces, and barbecue sauce.  Citrus fruit juices and citrus fruits, such as oranges, rudy, and limes.  Tomato-based foods, such as red sauce, chili, salsa, and pizza with red sauce.  Fried and fatty foods, such as donuts, french fries, potato chips, and high-fat dressings.  High-fat meats, such as hot dogs and fatty cuts of red and white meats, such as rib eye steak, sausage, ham, and marin.  High-fat dairy items, such as whole milk, butter, and cream cheese.  Eat small, frequent meals instead of large meals.  Avoid drinking large amounts of liquid with your meals.  Avoid eating meals during the 2-3 hours before bedtime.  Avoid lying down right after you eat.  Do not exercise right after you eat.  Lifestyle    Do not use any products that contain nicotine or tobacco. These products include cigarettes, chewing tobacco, and vaping devices, such as e-cigarettes. If you need help quitting, ask your health care provider.  Try to reduce your stress by using methods such as yoga or meditation. If you need help reducing stress, ask your health care provider.  If you are overweight, reduce your  weight to an amount that is healthy for you. Ask your health care provider for guidance about a safe weight loss goal.  General instructions  Pay attention to any changes in your symptoms.  Take over-the-counter and prescription medicines only as told by your health care provider. Do not take aspirin, ibuprofen, or other NSAIDs unless your health care provider told you to take these medicines.  Wear loose-fitting clothing. Do not wear anything tight around your waist that causes pressure on your abdomen.  Raise (elevate) the head of your bed about 6 inches (15 cm). You can use a wedge to do this.  Avoid bending over if this makes your symptoms worse.  Keep all follow-up visits. This is important.  Contact a health care provider if:  You have:  New symptoms.  Unexplained weight loss.  Difficulty swallowing or it hurts to swallow.  Wheezing or a persistent cough.  A hoarse voice.  Your symptoms do not improve with treatment.  Get help right away if:  You have sudden pain in your arms, neck, jaw, teeth, or back.  You suddenly feel sweaty, dizzy, or light-headed.  You have chest pain or shortness of breath.  You vomit and the vomit is green, yellow, or black, or it looks like blood or coffee grounds.  You faint.  You have stool that is red, bloody, or black.  You cannot swallow, drink, or eat.  These symptoms may represent a serious problem that is an emergency. Do not wait to see if the symptoms will go away. Get medical help right away. Call your local emergency services (911 in the [place] Do not drive yourself to the hospital.  Summary  Gastroesophageal reflux happens when acid from the stomach flows up into the esophagus. GERD is a disease in which the reflux happens often, causes frequent or severe symptoms, or causes problems such as damage to the esophagus.  Treatment for this condition may vary depending on how severe your symptoms are. Your health care provider may recommend diet and lifestyle changes,  medicine, or surgery.  Contact a health care provider if you have new or worsening symptoms.  Take over-the-counter and prescription medicines only as told by your health care provider. Do not take aspirin, ibuprofen, or other NSAIDs unless your health care provider told you to do so.  Keep all follow-up visits as told by your health care provider. This is important.  This information is not intended to replace advice given to you by your health care provider. Make sure you discuss any questions you have with your health care provider.  Document Revised: 06/28/2021 Document Reviewed: 06/28/2021  Elsevier Patient Education © 2024 [employer]

## 2024-09-24 ENCOUNTER — HOSPITAL ENCOUNTER (OUTPATIENT)
Dept: ULTRASOUND IMAGING | Facility: HOSPITAL | Age: 74
Discharge: HOME | End: 2024-09-24
Payer: MEDICARE

## 2024-09-24 ENCOUNTER — HOSPITAL ENCOUNTER (OUTPATIENT)
Dept: MAMMOGRAPHY | Facility: HOSPITAL | Age: 74
Discharge: HOME | End: 2024-09-24
Payer: MEDICARE

## 2024-09-24 DIAGNOSIS — R92.8 ABNORMALITY OF LEFT BREAST ON SCREENING MAMMOGRAM: ICD-10-CM

## 2024-09-24 PROCEDURE — 77065 DX MAMMO INCL CAD UNI: CPT

## 2024-09-24 PROCEDURE — G0279 TOMOSYNTHESIS, MAMMO: HCPCS

## 2024-09-24 PROCEDURE — 76642 ULTRASOUND BREAST LIMITED: CPT

## 2024-10-21 ENCOUNTER — HOSPITAL ENCOUNTER (OUTPATIENT)
Dept: MAMMOGRAPHY | Facility: HOSPITAL | Age: 74
Discharge: HOME | End: 2024-10-21
Payer: MEDICARE

## 2024-10-21 ENCOUNTER — PATIENT OUTREACH (OUTPATIENT)
Dept: ONCOLOGY | Facility: HOSPITAL | Age: 74
End: 2024-10-21
Payer: MEDICARE

## 2024-10-21 DIAGNOSIS — N63.42 SUBAREOLAR MASS OF LEFT BREAST: ICD-10-CM

## 2024-10-21 PROCEDURE — A4648 IMPLANTABLE TISSUE MARKER: HCPCS

## 2024-10-21 PROCEDURE — 88305 TISSUE EXAM BY PATHOLOGIST: CPT | Performed by: INTERNAL MEDICINE

## 2024-10-21 PROCEDURE — A9270 NON-COVERED ITEM OR SERVICE: HCPCS

## 2024-10-21 PROCEDURE — 63710000001 BACITRACIN 500 UNIT/GM OINTMENT

## 2024-10-21 PROCEDURE — 88342 IMHCHEM/IMCYTCHM 1ST ANTB: CPT | Performed by: INTERNAL MEDICINE

## 2024-10-21 PROCEDURE — 25010000002 LIDOCAINE 1% - EPINEPHRINE 1:100000 1 %-1:100000 SOLUTION

## 2024-10-21 PROCEDURE — 25010000002 LIDOCAINE 1 % SOLUTION

## 2024-10-21 PROCEDURE — 88360 TUMOR IMMUNOHISTOCHEM/MANUAL: CPT | Performed by: INTERNAL MEDICINE

## 2024-10-21 RX ORDER — LIDOCAINE HYDROCHLORIDE 10 MG/ML
INJECTION, SOLUTION INFILTRATION; PERINEURAL
Status: COMPLETED
Start: 2024-10-21 | End: 2024-10-21

## 2024-10-21 RX ORDER — LIDOCAINE HYDROCHLORIDE AND EPINEPHRINE 10; 10 MG/ML; UG/ML
INJECTION, SOLUTION INFILTRATION; PERINEURAL
Status: COMPLETED
Start: 2024-10-21 | End: 2024-10-21

## 2024-10-21 RX ORDER — DIAPER,BRIEF,INFANT-TODD,DISP
EACH MISCELLANEOUS
Status: COMPLETED
Start: 2024-10-21 | End: 2024-10-21

## 2024-10-21 RX ADMIN — BACITRACIN: 500 OINTMENT TOPICAL at 15:02

## 2024-10-21 RX ADMIN — LIDOCAINE HYDROCHLORIDE,EPINEPHRINE BITARTRATE: 10; .01 INJECTION, SOLUTION INFILTRATION; PERINEURAL at 15:02

## 2024-10-21 RX ADMIN — LIDOCAINE HYDROCHLORIDE: 10 INJECTION, SOLUTION INFILTRATION; PERINEURAL at 15:02

## 2024-10-23 LAB
CYTO UR: NORMAL
LAB AP CASE REPORT: NORMAL
LAB AP CLINICAL INFORMATION: NORMAL
LAB AP SPECIAL STAINS: NORMAL
LAB AP SYNOPTIC CHECKLIST: NORMAL
PATH REPORT.FINAL DX SPEC: NORMAL
PATH REPORT.GROSS SPEC: NORMAL

## 2024-10-25 ENCOUNTER — PATIENT OUTREACH (OUTPATIENT)
Dept: SURGERY | Facility: CLINIC | Age: 74
End: 2024-10-25
Payer: MEDICARE

## 2024-10-25 ENCOUNTER — PATIENT OUTREACH (OUTPATIENT)
Dept: ONCOLOGY | Facility: HOSPITAL | Age: 74
End: 2024-10-25
Payer: MEDICARE

## 2024-10-25 PROBLEM — C50.912 MALIGNANT NEOPLASM OF LEFT BREAST IN FEMALE, ESTROGEN RECEPTOR POSITIVE: Status: ACTIVE | Noted: 2024-10-25

## 2024-10-25 PROBLEM — Z17.0 MALIGNANT NEOPLASM OF LEFT BREAST IN FEMALE, ESTROGEN RECEPTOR POSITIVE: Status: ACTIVE | Noted: 2024-10-25

## 2024-10-25 NOTE — PROGRESS NOTES
Chief Complaint: Abnormal Breast Imaging    Subjective         History of Present Illness  Ashtyn Jean Baptiste is a 74 y.o. female presents to [facility] to be seen for left breast cancer.  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)  - Histologic grade ([place] Histologic Score):    - Glandular (Acinar)/Tubular Differentiation:  Score 2  - Nuclear Pleomorphism:  Score 1  - Mitotic Rate:  Score 1  - Overall Grade:  Grade 1               - Size of largest focus of invasion:  3 mm               - Breast biomarker testing:                            - Estrogen Receptor (ER):  Positive (100%, strong)                            - Progesterone Receptor (PgR):  Positive (100%, strong)                            - HER2 (by immunohistochemistry):  Negative (Score 1+)  - Ki-67: 12%      The above positive (malignant) diagnosis was called to  Jayro STAHL in Dr. CRISTIANO Morales's office at 15:10 EDT on 10/23/2024 by Rhonda ADAMS and also to SERENE MooreN, RN designee for Dr. JARON Lloyd at 15:16 EDT, on 10/23/2024 by Rhonad ADAMS.               Study Result    Narrative & Impression   MAMMO DIAGNOSTIC DIGITAL TOMOSYNTHESIS LEFT W CAD-, US BREAST LEFT  LIMITED-     Date of Exam: 9/24/2024 1:39 PM     Indication: 74-year-old woman recommended for short-term follow-up of  probably benign left breast asymmetry and left breast cysts detected at  new baseline.     Comparison: 2/8/2024, 3/1/2024.     Technique: Left diagnostic mammogram was performed utilizing  tomosynthesis, followed by targeted left breast ultrasound.     These mammographic images were interpreted with the assistance of a  computer aided detection system.     FINDINGS:  There are scattered areas of fibroglandular  density.     Mammogram:  7 mm spiculated mass in the central left breast, middle depth, visible  on the CC view, likely in the upper breast on MLO and ML views.  5 mm oval, circumscribed mass in the inner left breast, middle depth, is  stable in size on the CC view.     Ultrasound:  Likely corresponding to suspicious left breast mass on mammogram, there  is a 7 x 3 x 4 mm irregular solid mass at 12:00, 7 cm from the nipple.  Additional cystic masses in the inner left breast have a benign  appearance, measuring up to 1.6 cm at 10:00, 3 cm from the nipple.     IMPRESSION:  Recommend ultrasound-guided biopsy of 7 mm left breast mass at 12:00, 7  cm from the nipple.     I discussed results with the patient following the exam. She will be  scheduled for biopsy, and our staff will notify the ordering physician's  office.     BI-RADS ASSESSMENT: BI-RADS 4. Suspicious abnormality.        The patient's information is entered into a computerized reminder system  with a targeted due date for the next mammogram.     Note:  It has been reported that there is approximately a 15% false  negative in mammography.  Therefore, management of a palpable  abnormality should not be deferred because of a negative mammogram.                              Objective     Past Medical History:   Diagnosis Date    CHF (congestive heart failure)     COPD (chronic obstructive pulmonary disease)     GERD (gastroesophageal reflux disease)        Past Surgical History:   Procedure Laterality Date    APPENDECTOMY      COLONOSCOPY      HYSTERECTOMY           Current Outpatient Medications:     albuterol sulfate  (90 Base) MCG/ACT inhaler, Inhale 2 puffs Every 4 (Four) Hours As Needed for Wheezing., Disp: , Rfl:     arformoterol (BROVANA) 15 MCG/2ML nebulizer solution, Take 2 mL by nebulization 2 (Two) Times a Day., Disp: 120 mL, Rfl: 0    atenolol (TENORMIN) 25 MG tablet, Take 1 tablet by mouth Daily., Disp: , Rfl:     atorvastatin (LIPITOR) 40  "MG tablet, Take 1 tablet by mouth Daily., Disp: , Rfl:     clotrimazole-betamethasone (LOTRISONE) 1-0.05 % cream, Apply 1 Application topically to the appropriate area as directed 2 (Two) Times a Day., Disp: 45 g, Rfl: 0    escitalopram (LEXAPRO) 20 MG tablet, Take 1 tablet by mouth Daily., Disp: , Rfl:     ezetimibe (ZETIA) 10 MG tablet, Take 1 tablet by mouth Daily., Disp: , Rfl:     furosemide (LASIX) 40 MG tablet, Take 1 tablet by mouth Daily., Disp: 30 tablet, Rfl: 0    gabapentin (NEURONTIN) 800 MG tablet, Take 1 tablet by mouth 3 (Three) Times a Day., Disp: , Rfl:     Lidocaine (ZTlido) 1.8 %, ZTlido 1.8 % topical patch  APPLY 1 PATCH BY TOPICAL ROUTE ONCE DAILY (MAY WEAR UP TO 12HOURS.), Disp: , Rfl:     omeprazole (priLOSEC) 40 MG capsule, Take 1 capsule by mouth Daily., Disp: , Rfl:     traZODone (DESYREL) 50 MG tablet, , Disp: , Rfl:     budesonide (PULMICORT) 0.5 MG/2ML nebulizer solution, Take 2 mL by nebulization 2 (Two) Times a Day for 30 days., Disp: 120 mL, Rfl: 0    oxyCODONE-acetaminophen (PERCOCET) 7.5-325 MG per tablet, Take 1 tablet by mouth Every 8 (Eight) Hours As Needed. (Patient not taking: Reported on 10/28/2024), Disp: , Rfl:     Allergies   Allergen Reactions    Codeine Nausea And Vomiting, Hives and Shortness Of Breath     HIVES, SOA, ELEV HR    Morphine Nausea And Vomiting    Latex Hives     RASH/ HIVES FROM HEAD TO TOE    Avocado Nausea And Vomiting        Family History   Problem Relation Age of Onset    Esophageal cancer Brother     Colon cancer Neg Hx        Social History     Socioeconomic History    Marital status:    Tobacco Use    Smoking status: Former     Passive exposure: Past    Smokeless tobacco: Never    Tobacco comments:     22 years ago   Vaping Use    Vaping status: Never Used   Substance and Sexual Activity    Alcohol use: Yes     Comment: social- rarely    Drug use: Never    Sexual activity: Defer       Vital Signs:   Resp 16   Ht 157.5 cm (62\")   Wt 110 kg " (243 lb)   BMI 44.45 kg/m²    Review of Systems    Physical Exam  Vitals and nursing note reviewed.   Constitutional:       Appearance: Normal appearance.   HENT:      Head: Normocephalic and atraumatic.   Eyes:      Extraocular Movements: Extraocular movements intact.      Pupils: Pupils are equal, round, and reactive to light.   Cardiovascular:      Pulses: Normal pulses.   Pulmonary:      Effort: Pulmonary effort is normal. No accessory muscle usage or respiratory distress.   Chest:   Breasts:     Right: Normal. No inverted nipple, mass, nipple discharge or skin change.      Left: Normal. No inverted nipple, mass, nipple discharge or skin change.      Comments: Well healed left breast biopsy site  Abdominal:      General: Abdomen is flat.      Palpations: Abdomen is soft.      Tenderness: There is no abdominal tenderness. There is no guarding.   Musculoskeletal:         General: No swelling, tenderness or deformity.      Cervical back: Neck supple.   Lymphadenopathy:      Upper Body:      Right upper body: No supraclavicular or axillary adenopathy.      Left upper body: No supraclavicular or axillary adenopathy.   Skin:     General: Skin is warm and dry.   Neurological:      General: No focal deficit present.      Mental Status: She is alert and oriented to person, place, and time.   Psychiatric:         Mood and Affect: Mood normal.         Thought Content: Thought content normal.          Result Review :               Assessment and Plan    Diagnoses and all orders for this visit:    1. Malignant neoplasm of left breast in female, estrogen receptor positive, unspecified site of breast (Primary)  -     Case Request; Standing  -     Follow Anesthesia Guidelines / Protocol; Standing  -     Verify / Perform Chlorhexidine Skin Prep; Standing  -     Insert Peripheral IV; Standing  -     Saline Lock & Maintain IV Access; Standing  -     sodium chloride 0.9 % flush 3 mL  -     sodium chloride 0.9 % flush 10 mL  -      Place Sequential Compression Device; Standing  -     Maintain Sequential Compression Device; Standing  -     Mammo Breast Placement Device Initial Without Biopsy Left; Future  -     No Lab Testing Needed; Standing  -     ceFAZolin (ANCEF) 2,000 mg in sodium chloride 0.9 % 100 mL IVPB  -     Case Request  -     NM [place] Node Injection Only; Future    Will plan for needle loc lumpectomy and SLN on 12/10    Follow Up   Return for Next scheduled followup after surgery.  Patient was given instructions and counseling regarding her condition or for health maintenance advice. Please see specific information pulled into the AVS if appropriate.         This document has been electronically signed by Sandra Vernon MD  October 28, 2024 11:03 EDT

## 2024-10-28 ENCOUNTER — OFFICE VISIT (OUTPATIENT)
Dept: SURGERY | Facility: CLINIC | Age: 74
End: 2024-10-28
Payer: MEDICARE

## 2024-10-28 VITALS — WEIGHT: 243 LBS | RESPIRATION RATE: 16 BRPM | HEIGHT: 62 IN | BODY MASS INDEX: 44.72 KG/M2

## 2024-10-28 DIAGNOSIS — Z17.0 MALIGNANT NEOPLASM OF LEFT BREAST IN FEMALE, ESTROGEN RECEPTOR POSITIVE, UNSPECIFIED SITE OF BREAST: Primary | ICD-10-CM

## 2024-10-28 DIAGNOSIS — C50.912 MALIGNANT NEOPLASM OF LEFT BREAST IN FEMALE, ESTROGEN RECEPTOR POSITIVE, UNSPECIFIED SITE OF BREAST: Primary | ICD-10-CM

## 2024-10-28 PROCEDURE — 1159F MED LIST DOCD IN RCRD: CPT | Performed by: SURGERY

## 2024-10-28 PROCEDURE — 1160F RVW MEDS BY RX/DR IN RCRD: CPT | Performed by: SURGERY

## 2024-10-28 PROCEDURE — 99204 OFFICE O/P NEW MOD 45 MIN: CPT | Performed by: SURGERY

## 2024-10-28 RX ORDER — SODIUM CHLORIDE 0.9 % (FLUSH) 0.9 %
3 SYRINGE (ML) INJECTION EVERY 12 HOURS SCHEDULED
OUTPATIENT
Start: 2024-10-28

## 2024-10-28 RX ORDER — SODIUM CHLORIDE 0.9 % (FLUSH) 0.9 %
10 SYRINGE (ML) INJECTION AS NEEDED
OUTPATIENT
Start: 2024-10-28

## 2024-10-29 ENCOUNTER — PATIENT OUTREACH (OUTPATIENT)
Dept: ONCOLOGY | Facility: HOSPITAL | Age: 74
End: 2024-10-29
Payer: MEDICARE

## 2024-10-31 ENCOUNTER — LAB (OUTPATIENT)
Dept: ONCOLOGY | Facility: HOSPITAL | Age: 74
End: 2024-10-31
Payer: MEDICARE

## 2024-10-31 ENCOUNTER — TELEPHONE (OUTPATIENT)
Facility: HOSPITAL | Age: 74
End: 2024-10-31
Payer: MEDICARE

## 2024-10-31 ENCOUNTER — APPOINTMENT (OUTPATIENT)
Dept: ONCOLOGY | Facility: HOSPITAL | Age: 74
End: 2024-10-31
Payer: MEDICARE

## 2024-10-31 ENCOUNTER — TELEPHONE (OUTPATIENT)
Dept: OCCUPATIONAL THERAPY | Facility: HOSPITAL | Age: 74
End: 2024-10-31
Payer: MEDICARE

## 2024-10-31 ENCOUNTER — DOCUMENTATION (OUTPATIENT)
Dept: ONCOLOGY | Facility: HOSPITAL | Age: 74
End: 2024-10-31
Payer: MEDICARE

## 2024-10-31 ENCOUNTER — CONSULT (OUTPATIENT)
Dept: ONCOLOGY | Facility: HOSPITAL | Age: 74
End: 2024-10-31
Payer: MEDICARE

## 2024-10-31 VITALS
TEMPERATURE: 98.1 F | OXYGEN SATURATION: 92 % | SYSTOLIC BLOOD PRESSURE: 119 MMHG | HEART RATE: 99 BPM | RESPIRATION RATE: 18 BRPM | HEIGHT: 62 IN | BODY MASS INDEX: 45.49 KG/M2 | DIASTOLIC BLOOD PRESSURE: 55 MMHG | WEIGHT: 247.2 LBS

## 2024-10-31 DIAGNOSIS — Z17.0 MALIGNANT NEOPLASM OF LEFT BREAST IN FEMALE, ESTROGEN RECEPTOR POSITIVE, UNSPECIFIED SITE OF BREAST: Primary | ICD-10-CM

## 2024-10-31 DIAGNOSIS — Z78.0 POST-MENOPAUSAL: ICD-10-CM

## 2024-10-31 DIAGNOSIS — C50.912 MALIGNANT NEOPLASM OF LEFT BREAST IN FEMALE, ESTROGEN RECEPTOR POSITIVE, UNSPECIFIED SITE OF BREAST: Primary | ICD-10-CM

## 2024-10-31 DIAGNOSIS — Z01.818 ENCOUNTER FOR PREOPERATIVE ASSESSMENT: ICD-10-CM

## 2024-10-31 DIAGNOSIS — Z79.899 HIGH RISK MEDICATION USE: ICD-10-CM

## 2024-10-31 DIAGNOSIS — B49 FUNGAL INFECTION: ICD-10-CM

## 2024-10-31 DIAGNOSIS — Z91.89 AT RISK FOR LYMPHEDEMA: ICD-10-CM

## 2024-10-31 DIAGNOSIS — E55.9 VITAMIN D DEFICIENCY: ICD-10-CM

## 2024-10-31 PROCEDURE — G0463 HOSPITAL OUTPT CLINIC VISIT: HCPCS | Performed by: INTERNAL MEDICINE

## 2024-10-31 RX ORDER — NYSTATIN 100000 U/G
1 CREAM TOPICAL 2 TIMES DAILY
Qty: 30 G | Refills: 1 | Status: SHIPPED | OUTPATIENT
Start: 2024-10-31 | End: 2024-10-31

## 2024-10-31 RX ORDER — NYSTATIN 100000 U/G
1 CREAM TOPICAL 2 TIMES DAILY
Qty: 30 G | Refills: 1 | Status: SHIPPED | OUTPATIENT
Start: 2024-10-31 | End: 2024-11-14

## 2024-10-31 RX ORDER — ASPIRIN 81 MG/1
81 TABLET ORAL DAILY
COMMUNITY

## 2024-10-31 NOTE — PROGRESS NOTES
Distress Screening Follow-Up    Date of Distress Screening: 10/31/24     Location of Distress Screening: Medical oncology    Distress Level: 6    Problems Indicated: Fatigue, memory or concentration, loss or change of physical abilities, worry or anxiety, sadness or depression, fear, loneliness, changes in appearance, feelings of worthlessness or being a burden, taking care of myself, transportation    Diagnosis: Breast cancer    Current Tx Plan: Mrs. Jean Baptiste is scheduled for a left breast lumpectomy on 12/10/24. Post-surgery, an Oncotype Dx test will be performed to determine the need for chemotherapy. If more than three lymph nodes are involved, chemotherapy will be considered. If the Oncotype Dx score is low, chemotherapy will be bypassed, and radiation therapy will be initiated. Post-radiation, a 5-year course of anastrozole or Arimidex will be prescribed.     Most Recent PHQ -2/PHQ-9 Score: 1 (10/31/24)    Mental Status: Mrs. Jean Baptiste was very pleasant and openly engaged in conversation with OSW this afternoon. She did not present highly distressed. Her memory and cognitive skills were intact and she was able to provide adequate details to complete her psychosocial needs assessment today. She was able to teach back what was discussed with the oncologist today regarding her treatment plan. Provided Mrs. Jean Baptiste with emotional support throughout our encounter today, utilizing empathy and active listening, and validating and normalizing the various emotions that one experiences after receiving a cancer diagnosis and reviewed what symptoms and behaviors are considered healthy and normal versus those that may require clinical intervention. Discussed opportunity to get her connected to oncology peer support and/or behavioral health services for medication management or counseling if emotions persist or increase, affecting her daily functioning and ability to participate in normal activities. She v/u and respectfully  declined any referrals at this time.    Mental Health Treatment: Mrs. Jean Baptiste reports no history of mental health. She does have Trazodone and Lexapro prescriptions listed on file.     Substance Use/Tobacco Use: Mrs. Jean Baptiste is a former cigarette smoker, quitting over 20 years ago. She vary rarely consumes alcohol. She has no history of illicit drug use.    Support System: Mrs. Jean Baptiste receives positive support from her three children. Her daughter is present with her today and resides in [place]. Mrs. Jean Baptiste resides with her son in [place]. She has another child that lives out of [facility].    Spirituality: Mrs. Jean Baptiste believes in God. Her daughter has been trying to get her to come to [employer] with her. Advised that [facility] chaplains are available to provide spiritual support if interested in this support.    Hobbies: Mrs. Jean Baptiste enjoys spending her time cleaning, watching television and playing games on her phone. She also has a chihuahua puppy and a talking bird that is over 20 years old and calls her Mama.     Residential status/Who lives in the home: Ms. Jean Baptiste resides at home with her son in [facility].    Home Care Needs: Ms. Jean Baptiste is utilizing a [facility] wheelchair today. She reports not using DME at home, but is supposed to use a walker. She has a medical alert device at home if needed. She uses oxygen at night. She is able to perform some self-care tasks at home, but her son assists with cooking. She does not identify any home care needs at this time.    Insurance: [place] [employer]    Finances: Ms. Jean Baptiste denies any financial concerns at this time and reports her household has sufficient enough income to meet their basic needs. Discussed opportunity to apply for [facility] financial assistance program to help aid with medical expenses if needed.    Transportation: Mrs. Jean Baptiste holds a license and wishes to get back to driving again, but does not currently at this time. Her daughter provided her  transportation today. She and her daughter expressed concerns regarding transportation to radiation therapy treatments. OSW reviewed her health insurance and advised that unfortunately her plan does not have a transportation benefit unless she elects this as her essential extra. Otherwise, she may utilize [employer] transportation and pay for this service. If so, OSW can assist in seeing if there is a resource available to help with this travel expense. Mrs. Jean Baptiste may also want to consider staying with her daughter here locally in [place] during the week throughout her radiation treatment to cut down on the cost of [employer] transportation.    Advance Care Planning: No directive on file    Resources/Referrals: Emotional  support, transportation resources, financial resources    Additional Comment: OSW met with Mrs. Jean Baptiste and her daughter in the medical oncology clinic during her consultation to follow-up in regards to her high distress score of 6, introduce OSW role, and assess for psychosocial needs. Advised that OSW is available to help navigate the healthcare system, address any barriers to care or unmet physical, emotional, social, practical and spiritual needs. Mrs. Jean Baptiste's primary concern at this time is transportation to radiation therapy treatments.  She and her daughter will consider the resource options reviewed today and OSW will plan to touch base with them prior to starting radiation therapy treatment.  Provided them with my business card, encouraging OSW support remains available in the interim.  They expressed appreciation.
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
## 2024-12-06 ENCOUNTER — HOSPITAL ENCOUNTER (OUTPATIENT)
Facility: HOSPITAL | Age: 74
Setting detail: THERAPIES SERIES
Discharge: HOME | End: 2024-12-06
Payer: MEDICARE

## 2024-12-06 DIAGNOSIS — Z01.818 ENCOUNTER FOR PREOPERATIVE ASSESSMENT: ICD-10-CM

## 2024-12-06 DIAGNOSIS — C50.912 MALIGNANT NEOPLASM OF LEFT BREAST IN FEMALE, ESTROGEN RECEPTOR POSITIVE, UNSPECIFIED SITE OF BREAST: ICD-10-CM

## 2024-12-06 DIAGNOSIS — Z17.0 MALIGNANT NEOPLASM OF LEFT BREAST IN FEMALE, ESTROGEN RECEPTOR POSITIVE, UNSPECIFIED SITE OF BREAST: ICD-10-CM

## 2024-12-06 DIAGNOSIS — Z91.89 AT RISK FOR LYMPHEDEMA: Primary | ICD-10-CM

## 2024-12-10 ENCOUNTER — ANESTHESIA (OUTPATIENT)
Dept: PERIOP | Facility: HOSPITAL | Age: 74
End: 2024-12-10
Payer: MEDICARE

## 2024-12-10 ENCOUNTER — APPOINTMENT (OUTPATIENT)
Dept: MAMMOGRAPHY | Facility: HOSPITAL | Age: 74
End: 2024-12-10
Payer: MEDICARE

## 2024-12-10 ENCOUNTER — HOSPITAL ENCOUNTER (OUTPATIENT)
Dept: MAMMOGRAPHY | Facility: HOSPITAL | Age: 74
Setting detail: HOSPITAL OUTPATIENT SURGERY
Discharge: HOME | End: 2024-12-10
Payer: MEDICARE

## 2024-12-10 ENCOUNTER — HOSPITAL ENCOUNTER (OUTPATIENT)
Dept: NUCLEAR MEDICINE | Facility: HOSPITAL | Age: 74
Setting detail: HOSPITAL OUTPATIENT SURGERY
Discharge: HOME | End: 2024-12-10
Payer: MEDICARE

## 2024-12-10 ENCOUNTER — ANESTHESIA EVENT (OUTPATIENT)
Dept: PERIOP | Facility: HOSPITAL | Age: 74
End: 2024-12-10
Payer: MEDICARE

## 2024-12-10 ENCOUNTER — HOSPITAL ENCOUNTER (OUTPATIENT)
Facility: HOSPITAL | Age: 74
Setting detail: HOSPITAL OUTPATIENT SURGERY
Discharge: HOME | End: 2024-12-10
Attending: SURGERY | Admitting: SURGERY
Payer: MEDICARE

## 2024-12-10 VITALS
TEMPERATURE: 98.1 F | OXYGEN SATURATION: 92 % | RESPIRATION RATE: 18 BRPM | WEIGHT: 244.49 LBS | DIASTOLIC BLOOD PRESSURE: 85 MMHG | HEART RATE: 86 BPM | BODY MASS INDEX: 44.99 KG/M2 | HEIGHT: 62 IN | SYSTOLIC BLOOD PRESSURE: 130 MMHG

## 2024-12-10 DIAGNOSIS — Z17.0 MALIGNANT NEOPLASM OF LEFT BREAST IN FEMALE, ESTROGEN RECEPTOR POSITIVE, UNSPECIFIED SITE OF BREAST: ICD-10-CM

## 2024-12-10 DIAGNOSIS — C50.912 MALIGNANT NEOPLASM OF LEFT BREAST IN FEMALE, ESTROGEN RECEPTOR POSITIVE, UNSPECIFIED SITE OF BREAST: ICD-10-CM

## 2024-12-10 LAB
QT INTERVAL: 400 MS
QTC INTERVAL: 412 MS

## 2024-12-10 PROCEDURE — 25010000002 GENTAMICIN PER 80 MG: Performed by: SURGERY

## 2024-12-10 PROCEDURE — A9520 TC99 TILMANOCEPT DIAG 0.5MCI: HCPCS | Performed by: SURGERY

## 2024-12-10 PROCEDURE — 76098 X-RAY EXAM SURGICAL SPECIMEN: CPT

## 2024-12-10 PROCEDURE — 25010000002 LIDOCAINE 1 % SOLUTION

## 2024-12-10 PROCEDURE — 25010000002 DEXAMETHASONE PER 1 MG

## 2024-12-10 PROCEDURE — 25810000003 LACTATED RINGERS PER 1000 ML: Performed by: ANESTHESIOLOGY

## 2024-12-10 PROCEDURE — 88307 TISSUE EXAM BY PATHOLOGIST: CPT | Performed by: SURGERY

## 2024-12-10 PROCEDURE — 25010000002 CEFAZOLIN PER 500 MG: Performed by: SURGERY

## 2024-12-10 PROCEDURE — 93005 ELECTROCARDIOGRAM TRACING: CPT | Performed by: ANESTHESIOLOGY

## 2024-12-10 PROCEDURE — 25010000002 LIDOCAINE PF 2% 2 % SOLUTION

## 2024-12-10 PROCEDURE — 38792 RA TRACER ID OF SENTINL NODE: CPT

## 2024-12-10 PROCEDURE — 25810000003 LACTATED RINGERS PER 1000 ML

## 2024-12-10 PROCEDURE — 25010000002 PROPOFOL 10 MG/ML EMULSION

## 2024-12-10 PROCEDURE — C1819 TISSUE LOCALIZATION-EXCISION: HCPCS

## 2024-12-10 PROCEDURE — 34310000005 TECHETIUM TC99M TILMANOCEPT: Performed by: SURGERY

## 2024-12-10 PROCEDURE — 25010000002 FENTANYL CITRATE (PF) 50 MCG/ML SOLUTION

## 2024-12-10 PROCEDURE — 25010000002 BUPIVACAINE (PF) 0.25 % SOLUTION: Performed by: SURGERY

## 2024-12-10 PROCEDURE — 25010000002 ONDANSETRON PER 1 MG

## 2024-12-10 PROCEDURE — 88342 IMHCHEM/IMCYTCHM 1ST ANTB: CPT | Performed by: SURGERY

## 2024-12-10 PROCEDURE — 25010000002 SUGAMMADEX 200 MG/2ML SOLUTION

## 2024-12-10 DEVICE — LIGACLIP MCA MULTIPLE CLIP APPLIERS, 20 MEDIUM CLIPS
Type: IMPLANTABLE DEVICE | Site: BREAST | Status: FUNCTIONAL
Brand: LIGACLIP

## 2024-12-10 DEVICE — LIGACLIP MCA MULTIPLE CLIP APPLIERS, 20 LARGE CLIPS
Type: IMPLANTABLE DEVICE | Site: BREAST | Status: FUNCTIONAL
Brand: LIGACLIP

## 2024-12-10 RX ORDER — SODIUM CHLORIDE 0.9 % (FLUSH) 0.9 %
3 SYRINGE (ML) INJECTION EVERY 12 HOURS SCHEDULED
Status: DISCONTINUED | OUTPATIENT
Start: 2024-12-10 | End: 2024-12-10 | Stop reason: HOSPADM

## 2024-12-10 RX ORDER — PROMETHAZINE HYDROCHLORIDE 12.5 MG/1
25 TABLET ORAL ONCE AS NEEDED
Status: DISCONTINUED | OUTPATIENT
Start: 2024-12-10 | End: 2024-12-11 | Stop reason: HOSPADM

## 2024-12-10 RX ORDER — DEXAMETHASONE SODIUM PHOSPHATE 4 MG/ML
INJECTION, SOLUTION INTRA-ARTICULAR; INTRALESIONAL; INTRAMUSCULAR; INTRAVENOUS; SOFT TISSUE AS NEEDED
Status: DISCONTINUED | OUTPATIENT
Start: 2024-12-10 | End: 2024-12-10 | Stop reason: SURG

## 2024-12-10 RX ORDER — SODIUM CHLORIDE, SODIUM LACTATE, POTASSIUM CHLORIDE, CALCIUM CHLORIDE 600; 310; 30; 20 MG/100ML; MG/100ML; MG/100ML; MG/100ML
9 INJECTION, SOLUTION INTRAVENOUS ONCE
Status: COMPLETED | OUTPATIENT
Start: 2024-12-10 | End: 2024-12-10

## 2024-12-10 RX ORDER — FENTANYL CITRATE 50 UG/ML
25 INJECTION, SOLUTION INTRAMUSCULAR; INTRAVENOUS
Status: DISCONTINUED | OUTPATIENT
Start: 2024-12-10 | End: 2024-12-11 | Stop reason: HOSPADM

## 2024-12-10 RX ORDER — HYDROCODONE BITARTRATE AND ACETAMINOPHEN 5; 325 MG/1; MG/1
1 TABLET ORAL EVERY 6 HOURS PRN
Qty: 20 TABLET | Refills: 0 | Status: SHIPPED | OUTPATIENT
Start: 2024-12-10

## 2024-12-10 RX ORDER — PROPOFOL 10 MG/ML
VIAL (ML) INTRAVENOUS AS NEEDED
Status: DISCONTINUED | OUTPATIENT
Start: 2024-12-10 | End: 2024-12-10 | Stop reason: SURG

## 2024-12-10 RX ORDER — OXYCODONE AND ACETAMINOPHEN 5; 325 MG/1; MG/1
1 TABLET ORAL ONCE AS NEEDED
Status: COMPLETED | OUTPATIENT
Start: 2024-12-10 | End: 2024-12-10

## 2024-12-10 RX ORDER — ONDANSETRON 2 MG/ML
4 INJECTION INTRAMUSCULAR; INTRAVENOUS ONCE AS NEEDED
Status: DISCONTINUED | OUTPATIENT
Start: 2024-12-10 | End: 2024-12-11 | Stop reason: HOSPADM

## 2024-12-10 RX ORDER — ROCURONIUM BROMIDE 10 MG/ML
INJECTION, SOLUTION INTRAVENOUS AS NEEDED
Status: DISCONTINUED | OUTPATIENT
Start: 2024-12-10 | End: 2024-12-10 | Stop reason: SURG

## 2024-12-10 RX ORDER — IPRATROPIUM BROMIDE AND ALBUTEROL SULFATE 2.5; .5 MG/3ML; MG/3ML
3 SOLUTION RESPIRATORY (INHALATION) ONCE
Status: COMPLETED | OUTPATIENT
Start: 2024-12-10 | End: 2024-12-10

## 2024-12-10 RX ORDER — SODIUM CHLORIDE 0.9 % (FLUSH) 0.9 %
10 SYRINGE (ML) INJECTION AS NEEDED
Status: DISCONTINUED | OUTPATIENT
Start: 2024-12-10 | End: 2024-12-10 | Stop reason: HOSPADM

## 2024-12-10 RX ORDER — FENTANYL CITRATE 50 UG/ML
INJECTION, SOLUTION INTRAMUSCULAR; INTRAVENOUS AS NEEDED
Status: DISCONTINUED | OUTPATIENT
Start: 2024-12-10 | End: 2024-12-10 | Stop reason: SURG

## 2024-12-10 RX ORDER — PROMETHAZINE HYDROCHLORIDE 25 MG/1
25 SUPPOSITORY RECTAL ONCE AS NEEDED
Status: DISCONTINUED | OUTPATIENT
Start: 2024-12-10 | End: 2024-12-11 | Stop reason: HOSPADM

## 2024-12-10 RX ORDER — ONDANSETRON 4 MG/1
4 TABLET, ORALLY DISINTEGRATING ORAL ONCE AS NEEDED
Status: DISCONTINUED | OUTPATIENT
Start: 2024-12-10 | End: 2024-12-11 | Stop reason: HOSPADM

## 2024-12-10 RX ORDER — LIDOCAINE HYDROCHLORIDE 20 MG/ML
INJECTION, SOLUTION EPIDURAL; INFILTRATION; INTRACAUDAL; PERINEURAL AS NEEDED
Status: DISCONTINUED | OUTPATIENT
Start: 2024-12-10 | End: 2024-12-10 | Stop reason: SURG

## 2024-12-10 RX ORDER — SODIUM CHLORIDE, SODIUM LACTATE, POTASSIUM CHLORIDE, CALCIUM CHLORIDE 600; 310; 30; 20 MG/100ML; MG/100ML; MG/100ML; MG/100ML
INJECTION, SOLUTION INTRAVENOUS CONTINUOUS PRN
Status: DISCONTINUED | OUTPATIENT
Start: 2024-12-10 | End: 2024-12-10 | Stop reason: SURG

## 2024-12-10 RX ORDER — SCOLOPAMINE TRANSDERMAL SYSTEM 1 MG/1
1 PATCH, EXTENDED RELEASE TRANSDERMAL ONCE
Status: DISCONTINUED | OUTPATIENT
Start: 2024-12-10 | End: 2024-12-11 | Stop reason: HOSPADM

## 2024-12-10 RX ORDER — LIDOCAINE HYDROCHLORIDE 10 MG/ML
INJECTION, SOLUTION INFILTRATION; PERINEURAL
Status: COMPLETED
Start: 2024-12-10 | End: 2024-12-10

## 2024-12-10 RX ORDER — GENTAMICIN 40 MG/ML
INJECTION, SOLUTION INTRAMUSCULAR; INTRAVENOUS AS NEEDED
Status: DISCONTINUED | OUTPATIENT
Start: 2024-12-10 | End: 2024-12-10 | Stop reason: HOSPADM

## 2024-12-10 RX ORDER — ACETAMINOPHEN 500 MG
1000 TABLET ORAL ONCE
Status: COMPLETED | OUTPATIENT
Start: 2024-12-10 | End: 2024-12-10

## 2024-12-10 RX ORDER — BUPIVACAINE HYDROCHLORIDE 2.5 MG/ML
INJECTION, SOLUTION EPIDURAL; INFILTRATION; INTRACAUDAL AS NEEDED
Status: DISCONTINUED | OUTPATIENT
Start: 2024-12-10 | End: 2024-12-10 | Stop reason: HOSPADM

## 2024-12-10 RX ORDER — ONDANSETRON 2 MG/ML
INJECTION INTRAMUSCULAR; INTRAVENOUS AS NEEDED
Status: DISCONTINUED | OUTPATIENT
Start: 2024-12-10 | End: 2024-12-10 | Stop reason: SURG

## 2024-12-10 RX ORDER — MEPERIDINE HYDROCHLORIDE 25 MG/ML
12.5 INJECTION INTRAMUSCULAR; INTRAVENOUS; SUBCUTANEOUS
Status: DISCONTINUED | OUTPATIENT
Start: 2024-12-10 | End: 2024-12-11 | Stop reason: HOSPADM

## 2024-12-10 RX ADMIN — PROPOFOL 30 MG: 10 INJECTION, EMULSION INTRAVENOUS at 15:19

## 2024-12-10 RX ADMIN — ROCURONIUM BROMIDE 50 MG: 50 INJECTION INTRAVENOUS at 14:13

## 2024-12-10 RX ADMIN — FENTANYL CITRATE 50 MCG: 50 INJECTION, SOLUTION INTRAMUSCULAR; INTRAVENOUS at 14:43

## 2024-12-10 RX ADMIN — SODIUM CHLORIDE, POTASSIUM CHLORIDE, SODIUM LACTATE AND CALCIUM CHLORIDE 9 ML/HR: 600; 310; 30; 20 INJECTION, SOLUTION INTRAVENOUS at 11:12

## 2024-12-10 RX ADMIN — FENTANYL CITRATE 25 MCG: 50 INJECTION, SOLUTION INTRAMUSCULAR; INTRAVENOUS at 15:49

## 2024-12-10 RX ADMIN — FENTANYL CITRATE 25 MCG: 50 INJECTION, SOLUTION INTRAMUSCULAR; INTRAVENOUS at 14:24

## 2024-12-10 RX ADMIN — PROPOFOL 150 MG: 10 INJECTION, EMULSION INTRAVENOUS at 14:13

## 2024-12-10 RX ADMIN — TILMANOCEPT 1 DOSE: KIT at 10:49

## 2024-12-10 RX ADMIN — SCOPOLAMINE 1 PATCH: 1.5 PATCH, EXTENDED RELEASE TRANSDERMAL at 11:13

## 2024-12-10 RX ADMIN — OXYCODONE HYDROCHLORIDE AND ACETAMINOPHEN 1 TABLET: 5; 325 TABLET ORAL at 16:02

## 2024-12-10 RX ADMIN — SODIUM CHLORIDE, POTASSIUM CHLORIDE, SODIUM LACTATE AND CALCIUM CHLORIDE: 600; 310; 30; 20 INJECTION, SOLUTION INTRAVENOUS at 14:13

## 2024-12-10 RX ADMIN — ONDANSETRON 4 MG: 2 INJECTION INTRAMUSCULAR; INTRAVENOUS at 14:22

## 2024-12-10 RX ADMIN — LIDOCAINE HYDROCHLORIDE 100 MG: 20 INJECTION, SOLUTION INTRAVENOUS at 14:13

## 2024-12-10 RX ADMIN — SODIUM CHLORIDE 2000 MG: 9 INJECTION, SOLUTION INTRAVENOUS at 14:18

## 2024-12-10 RX ADMIN — ACETAMINOPHEN 1000 MG: 500 TABLET ORAL at 11:12

## 2024-12-10 RX ADMIN — DEXAMETHASONE SODIUM PHOSPHATE 4 MG: 4 INJECTION, SOLUTION INTRAMUSCULAR; INTRAVENOUS at 14:22

## 2024-12-10 RX ADMIN — PROPOFOL 30 MG: 10 INJECTION, EMULSION INTRAVENOUS at 15:16

## 2024-12-10 RX ADMIN — IPRATROPIUM BROMIDE AND ALBUTEROL SULFATE 3 ML: .5; 3 SOLUTION RESPIRATORY (INHALATION) at 11:12

## 2024-12-10 RX ADMIN — PROPOFOL 150 MCG/KG/MIN: 10 INJECTION, EMULSION INTRAVENOUS at 14:16

## 2024-12-10 RX ADMIN — FENTANYL CITRATE 25 MCG: 50 INJECTION, SOLUTION INTRAMUSCULAR; INTRAVENOUS at 14:13

## 2024-12-10 RX ADMIN — SUGAMMADEX 200 MG: 100 INJECTION, SOLUTION INTRAVENOUS at 15:25

## 2024-12-10 RX ADMIN — LIDOCAINE HYDROCHLORIDE: 10 INJECTION, SOLUTION INFILTRATION; PERINEURAL at 10:43

## 2024-12-10 NOTE — ANESTHESIA POSTPROCEDURE EVALUATION
Patient: Ashtyn Jean Baptiste    Procedure Summary       Date: 12/10/24 Room / Location: [facility]  /  [facility]    Anesthesia Start: 1408 Anesthesia Stop: 1541    Procedure: BREAST LUMPECTOMY WITH SENTINEL NODE BIOPSY AND NEEDLE LOCALIZATION: Removal of cancerous or abnormal tissue from left breast with wire guide and removal of nodes (Left: Breast) Diagnosis:       Malignant neoplasm of left breast in female, estrogen receptor positive, unspecified site of breast      (Malignant neoplasm of left breast in female, estrogen receptor positive, unspecified site of breast [C50.912, Z17.0])    Surgeons: Sandra Vernon MD Provider: Elton Aparicio MD    Anesthesia Type: general ASA Status: 3            Anesthesia Type: general    Vitals  Vitals Value Taken Time   /85 12/10/24 1625   Temp 37 °C (98.6 °F) 12/10/24 1539   Pulse 81 12/10/24 1628   Resp 18 12/10/24 1545   SpO2 81 % 12/10/24 1625   Vitals shown include unfiled device data.        Post Anesthesia Care and Evaluation    Patient location during evaluation: bedside  Patient participation: complete - patient participated  Level of consciousness: awake  Pain management: adequate    Airway patency: patent  PONV Status: none  Cardiovascular status: acceptable and stable  Respiratory status: acceptable  Hydration status: acceptable
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
## 2024-12-10 NOTE — OP NOTE
BREAST LUMPECTOMY WITH SENTINEL NODE BIOPSY AND NEEDLE LOCALIZATION  Procedure Report    Patient Name:  Ashtyn Jean Baptiste  YOB: 1950    Date of Surgery:  12/10/2024     Indications:  Breast cancer    Pre-op Diagnosis:   Malignant neoplasm of left breast in female, estrogen receptor positive, unspecified site of breast [C50.912, Z17.0]       Post-Op Diagnosis Codes:     * Malignant neoplasm of left breast in female, estrogen receptor positive, unspecified site of breast [C50.912, Z17.0]    Procedure/CPT® Codes:      Procedure(s):  BREAST LUMPECTOMY WITH SENTINEL NODE BIOPSY AND NEEDLE LOCALIZATION: Removal of cancerous or abnormal tissue from left breast with wire guide and removal of nodes    Staff:  Surgeon(s):  Sandra Vernon MD    Assistant: Aurora Case RN CSA; Salvador Kay    Anesthesia: General    Estimated Blood Loss: minimal    Implants:    Implant Name Type Inv. Item Serial No.  Lot No. LRB No. Used Action   CLIPAPPLR M/ ENDO LIGACLIP 20CLP 11IN MD - [id number] Implant CLIPAPPLR M/ ENDO LIGACLIP 20CLP 11IN MD  [employer]  DIV OF J [employer] J [id number] Left 1 Implanted   CLIPAPPLR M/ ENDO LIGACLIP 13IN  - [id number] Implant CLIPAPPLR M/ ENDO LIGACLIP 13IN LG  [employer]  DIV [employer] J [employer] J [id number] Left 1 Implanted   CLIPAPPLR M/ ENDO LIGACLIP 20CLP 11IN MD - [id number] Implant CLIPAPPLR M/ ENDO LIGACLIP 20CLP 11IN MD  [employer]  DIV [employer] J [employer] J [id number] Left 1 Implanted       Specimen:          Specimens       ID Source Type Tests Collected By Collected At Frozen?    A [place] Lymph Node Tissue TISSUE PATHOLOGY EXAM   Sandra Vernon MD 12/10/24 1432 No    Description: #1 left breast sentinel node count 27    Comment: Sent fresh      B [place] Lymph Node Tissue TISSUE PATHOLOGY EXAM   Sandra Vernon MD 12/10/24 1433     Description: # 2 sentinel node left breast, count 140    Comment: Sent fresh, not for frozen      C Breast, Left Tissue  TISSUE PATHOLOGY EXAM   Sandra Vernon MD 12/10/24 1094     Description: left breast needle localized lump, short stitch superior, long stitch lateral    Comment: Sent fresh, to mammo first                Findings: none    Complications: none    Description of Procedure:   The risks and benefits and treatment options were discussed with her. After informed consent was obtained, she was taken to the OR and placed supine on the table. Earlier this AM she had a wire placed and she had also been injected for a sentinel node. We began with axillary portion of the procedure. A small incision was made in the axilla and this probe was used to identify the hottest nodes. After the clavipectoral fascia had been incised, one node had a count of 140 and the second node had a count of 27.  The remainder of the bed had a count of less than 10% of the highest number. The wound was copiously irrigated with electrocautery and clips were used to achieve hemostasis. The deeper layers were closed with 2-0 Vicryl and the skin was closed with running sutures.    We then moved to the breast portion of the procedure. An incision was made around the wire and then skin flaps were raised anteriorly and the tissue was excised circumferentially around the area. The specimen was removed, marked for orientation, and handed off as a specimen. The wound bed was irrigated. Hemostasis was achieved with electrocautery and clips. The deeper layers were closed with 2-0 Vicryl, the skin was closed with a 4-0 subcuticular stitch. The specimen had been sent to radiology for evaluation and they did call back and confirm that we had the wire, the clip, the abnormal appearing tissue and a good margin of normal tissue around it  Assistant: Aurora Case RN CSA; Salvador Kay  was responsible for performing the following activities: Retraction, Suction, and Irrigation and their skilled assistance was necessary for the success of this  case.    [place] Node Biopsy for Breast Cancer - Left  Operation performed with curative intent. Yes   Tracer(s) used to identify sentinel nodes in the upfront surgery (non-neoadjuvant) setting (select all that apply). Radioactive tracer   Tracer(s) used to identify sentinel nodes in the neoadjuvant setting (select all that apply). N/A   All nodes (colored or non-colored) present at the end of a dye-filled lymphatic channel were removed. N/A   All significantly radioactive nodes were removed. Yes   All palpably suspicious nodes were removed. Yes   Biopsy-proven positive nodes marked with clips prior to chemotherapy were identified and removed. N/A                Sandra Vernon MD     Date: 12/10/2024  Time: 15:11 EST

## 2024-12-10 NOTE — ANESTHESIA PREPROCEDURE EVALUATION
Anesthesia Evaluation     Patient summary reviewed and Nursing notes reviewed   history of anesthetic complications:  PONV  NPO Solid Status: > 8 hours  NPO Liquid Status: > 2 hours           Airway   Mallampati: II  Dental - normal exam     Pulmonary - normal exam   (+) a smoker Former, COPD,  Cardiovascular - normal exam  Exercise tolerance: poor (<4 METS)    (+) CHF       Neuro/Psych- negative ROS  GI/Hepatic/Renal/Endo    (+) morbid obesity, GERD    Musculoskeletal (-) negative ROS    Abdominal   (+) obese   Substance History - negative use     OB/GYN negative ob/gyn ROS         Other      history of cancer    ROS/Med Hx Other: PAT Nursing Notes unavailable.                     Anesthesia Plan    ASA 3     general     intravenous induction     Anesthetic plan, risks, benefits, and alternatives have been provided, discussed and informed consent has been obtained with: patient.    Plan discussed with CRNA.        CODE STATUS:

## 2024-12-10 NOTE — DISCHARGE INSTRUCTIONS
DISCHARGE INSTRUCTIONS  [] Breast Biopsy  [x] Lumpectomy  [] Lymph Node Dissection           For your surgery you had:  General anesthesia (you may have a sore throat for the first 24 hours)  IV sedation  Local anesthesia  Monitored anesthesia care  You have received a medicated patch for nausea prevention today (behind your ear). It is recommended that you remove it 24-48 hours post-operatively. It must be removed within 72 hours. REMOVE THURSDAY. Wash hands after removal.    You may experience dizziness, drowsiness, or light-headedness for several hours following surgery/procedure.  Do not stay alone today or tonight.  Limit your activity for 24 hours.  You should not drive, operate machinery, drink alcohol, or sign legally binding documents for 24 hours or while you are taking pain medication.  Resume your diet slowly.  Follow whatever special dietary instructions you may have been given by your doctor.  Last dose of pain medication was given at:    4:00 pm.  NOTIFY YOUR DOCTOR IF YOU EXPERIENCE ANY OF THE FOLLOWING:  Temperature greater than 101 degrees Fahrenheit  Shaking Chills  Redness or excessive drainage from incision  Nausea, vomiting and/or pain that is not controlled by prescribed medications  Increase in bleeding or bleeding that is excessive  Unable to urinate in 6 hours after surgery  If unable to reach your doctor, please go to the closest Emergency Room  You may begin dressing changes:     [] in 24 hours   [x] in 48 hours   [] Other:    You may remove your dressing on THURSDAY.  You may shower or bathe:THURSDAY. No tub baths or scrubbing incision.   Ice pack for 24-48 hours.  Wear a bra for support 24/7- even while sleeping.  Do not do any heavy lifting greater than 5 lbs for 4 weeks.  After your surgery you may notice some bruising.  This is normal.

## 2024-12-13 ENCOUNTER — TELEPHONE (OUTPATIENT)
Dept: SURGERY | Facility: CLINIC | Age: 74
End: 2024-12-13
Payer: MEDICARE

## 2024-12-13 ENCOUNTER — PATIENT OUTREACH (OUTPATIENT)
Dept: ONCOLOGY | Facility: HOSPITAL | Age: 74
End: 2024-12-13
Payer: MEDICARE

## 2024-12-13 LAB
CYTO UR: NORMAL
LAB AP CASE REPORT: NORMAL
LAB AP CLINICAL INFORMATION: NORMAL
LAB AP DIAGNOSIS COMMENT: NORMAL
LAB AP SYNOPTIC CHECKLIST: NORMAL
PATH REPORT.FINAL DX SPEC: NORMAL
PATH REPORT.GROSS SPEC: NORMAL

## 2024-12-18 ENCOUNTER — OFFICE VISIT (OUTPATIENT)
Dept: SURGERY | Facility: CLINIC | Age: 74
End: 2024-12-18
Payer: MEDICARE

## 2024-12-18 DIAGNOSIS — C50.912 MALIGNANT NEOPLASM OF LEFT BREAST IN FEMALE, ESTROGEN RECEPTOR POSITIVE, UNSPECIFIED SITE OF BREAST: Primary | ICD-10-CM

## 2024-12-18 DIAGNOSIS — Z17.0 MALIGNANT NEOPLASM OF LEFT BREAST IN FEMALE, ESTROGEN RECEPTOR POSITIVE, UNSPECIFIED SITE OF BREAST: Primary | ICD-10-CM

## 2024-12-18 PROCEDURE — 99024 POSTOP FOLLOW-UP VISIT: CPT | Performed by: SURGERY

## 2024-12-18 PROCEDURE — 1159F MED LIST DOCD IN RCRD: CPT | Performed by: SURGERY

## 2024-12-18 PROCEDURE — 1160F RVW MEDS BY RX/DR IN RCRD: CPT | Performed by: SURGERY

## 2024-12-18 RX ORDER — AMLODIPINE BESYLATE 5 MG/1
1 TABLET ORAL DAILY
COMMUNITY
Start: 2024-11-04

## 2024-12-18 RX ORDER — NYSTATIN 100000 U/G
CREAM TOPICAL
COMMUNITY

## 2024-12-18 RX ORDER — DICYCLOMINE HCL 20 MG
1 TABLET ORAL DAILY
COMMUNITY
Start: 2024-11-04

## 2024-12-18 RX ORDER — BUDESONIDE AND FORMOTEROL FUMARATE DIHYDRATE 160; 4.5 UG/1; UG/1
2 AEROSOL RESPIRATORY (INHALATION)
COMMUNITY
Start: 2024-11-04

## 2024-12-18 NOTE — PROGRESS NOTES
Chief Complaint  Follow-up    Subjective          History of Present Illness  The patient is here to follow up on needle loc lumpectomy with SLN bx.  They are doing well and have no complaints.  Pathology is shown below:    Results for orders placed or performed during the hospital encounter of 12/10/24   ECG 12 Lead Pre-Op / Pre-Procedure    Collection Time: 12/10/24  9:19 AM   Result Value Ref Range    QT Interval 400 ms    QTC Interval 412 ms   Tissue Pathology Exam    Collection Time: 12/10/24  2:32 PM    Specimen: A: [place] Lymph Node; Tissue    B: [place] Lymph Node; Tissue    C: Breast, Left; Tissue   Result Value Ref Range    Case Report       Surgical Pathology Report                         Case: [id number]                                  Authorizing Provider:  Sandra Vernon MD    Collected:           12/10/2024 02:32 PM          Ordering Location:     [facility]  Received:            12/11/2024 10:53 AM                                 OR                                                                           Pathologist:           Dalton [place] MD                                                            Specimens:   1) - [place] Lymph Node, #1 left breast sentinel node count 27                                     2) - [place] Lymph Node, # 2 sentinel node left breast, count 140                                  3) - Breast, Left, left breast needle localized lump, short stitch superior, long                   stitch lateral                                                                             Clinical Information       Malignant neoplasm of left breast in female, estrogen receptor positive, unspecified site of breast      Final Diagnosis       1. Left breast sentinel node #1, count 27, excision:   - One lymph node negative for carcinoma (0/1)   - See synoptic checklist      2. Left sentinel node #2, count 140, excision:   - One lymph node negative for carcinoma  (0/1)   - See synoptic checklist      3. Left breast mass, excision:   - Invasive carcinoma of no special type (ductal)   - Lobular carcinoma in situ (LCIS), classic type   - Atypical ductal hyperplasia (ADH)   - Gynecomastoid hyperplasia   - Usual ductal hyperplasia (UDH)   - Apocrine cysts   - Intraductal micro papilloma   - Biopsy site changes   - See synoptic checklist      Synoptic Checklist       INVASIVE CARCINOMA OF THE BREAST: Resection   INVASIVE CARCINOMA OF THE BREAST: RESECTION - All Specimens   8th Edition - Protocol posted: 6/19/2024      SPECIMEN      Procedure:    Excision (less than total mastectomy)       Specimen Laterality:    Left       TUMOR      Histologic Type:    Invasive carcinoma of no special type (ductal)       Histologic Grade (M[place]otte Histologic Score):            Glandular (Acinar) / Tubular Differentiation:    Score 1         Nuclear Pleomorphism:    Score 1         Mitotic Rate:    Score 1         Overall Grade:    Grade 1 (scores of 3, 4 or 5)       Tumor Size:    Greatest dimension of largest invasive focus (Millimeters): 8 mm      Ductal Carcinoma In Situ (DCIS):    Not identified       Lymphatic and / or Vascular Invasion:    Not identified       Treatment Effect in the Breast:    No known presurgical therapy       MARGINS      Margin Status for Invasive Carcinoma:    All margins negative for invasive carcinoma         Distance from Invasive Carcinoma to Closest Margin:    11 mm        Closest Margin(s) to Invasive Carcinoma:    Medial       REGIONAL LYMPH NODES      Regional Lymph Node Status:            :    All regional lymph nodes negative for tumor         Total Number of Lymph Nodes Examined (sentinel and non-sentinel):    2         Number of [place] Nodes Examined:    2       pTNM CLASSIFICATION (AJCC 8th Edition)      Reporting of pT, pN, and (when applicable) pM categories is based on information available to the pathologist at the time the report is issued. As per  "the AJCC (Chapter 1, 8th Ed.) it is the managing physician's responsibility to establish the final pathologic stage based upon all pertinent information, including but potentially not limited to this pathology report.      pT Category:    pT1b       pN Category:    pN0       N Suffix:    (sn)       SPECIAL STUDIES      Estrogen Receptor (ER) Status:    Positive (greater than 10% of cells demonstrate nuclear positivity)         Percentage of Cells with Nuclear Positivity:    100 %      Progesterone Receptor (PgR) Status:    Positive         Percentage of Cells with Nuclear Positivity:    100 %      HER2 (by immunohistochemistry):    Negative (Score 1+)       Ki-67 Percentage of Positive Nuclei:    12 %      Testing Performed on Case Number:    [id number]       Comment       An immunohistochemical stain for E-cadherin is performed on block 3F to evaluate atypical cells and is lost, consistent with lobular carcinoma in situ.  The control stains appropriately.      Gross Description       1. [place] Lymph Node.  Received fresh and subsequently placed in formalin, labeled \"left breast sentinel node count 27\" is a 6.0 cm fragment of adipose tissue containing a 2.5 cm disrupted lymph node.  Sectioning reveals a tan, fatty replaced cut surface.  The lymph node is entirely submitted in 2 cassettes.  2. [place] Lymph Node.    Received fresh and subsequently placed in formalin, labeled \" #2 sentinel node left breast, count 140\" is a 5.5 cm fragment of adipose tissue containing a 3.0 cm lymph node.  Sectioning reveals a tan-pink, fatty replaced cut surface.  The lymph node is entirely submitted in 2 cassettes.    3. Breast, Left.  Received fresh on an Accu grid and subsequently placed in formalin, labeled \" left breast needle localized lump, short stitch superior, long stitch lateral\" is a 101 g, 7.5 x 7.0 x 3.8 cm left lumpectomy specimen with 2 stitches and a needle localization wire protruding from the superior aspect.  No " skin is present.  The specimen is differentially inked (see ink key) and serially sectioned from superior to inferior to reveal a 2.0 x 1.0 x 0.5 cm fibrous lesion containing a coiled metallic biopsy clip, located 1.3 cm from the medial margin, 1.4 cm from the posterior margin, 1.4 cm from the anterior margin, 3.5 cm of the lateral margin, 2.5 cm from the superior margin, and 3.0 cm from the inferior margin.  A 2.0 x 1.5 x 1.0 cm cystic lesion is identified abutting the posterior and lateral margins.  Representative sections are submitted as follows: 3A-lesion with biopsy cavity and medial margin; 3B-lesion with remainder of biopsy cavity and posterior margin; 3C-lesion with anterior margin (2 fragments); 3D-lesion with posterior margin; 3E-sections of nearest superior/inferior/lateral margins (3 fragments); 3F-3G-cystic lesion with posterior/lateral margins; 3H-fibrous tissue with inferior margin.  Ink key: anterior-green, inferior-blue, lateral-orange, medial-yellow, posterior-black, superior-red.  Cold ischemic time-18 minutes;Total formalin fixation time-28 hours and 44 minutes. LUÍS      Microscopic Description       Microscopic examination performed.          Objective   Vital Signs:   There were no vitals taken for this visit.    Physical Exam  Vitals and nursing note reviewed.   Constitutional:       General: She is not in acute distress.     Appearance: Normal appearance. She is well-developed.   HENT:      Head: Normocephalic and atraumatic.   Eyes:      Extraocular Movements: Extraocular movements intact.      Pupils: Pupils are equal, round, and reactive to light.   Cardiovascular:      Pulses: Normal pulses.   Pulmonary:      Effort: Pulmonary effort is normal. No retractions.      Breath sounds: Normal air entry. No wheezing.   Abdominal:      General: There is no distension.      Palpations: Abdomen is soft.      Tenderness: There is no abdominal tenderness.      Hernia: No hernia is present.    Musculoskeletal:         General: No swelling or deformity.      Cervical back: Neck supple.   Skin:     General: Skin is warm and dry.      Findings: No erythema.      Comments: Surgical Incision Healing Well   Neurological:      General: No focal deficit present.      Mental Status: She is alert and oriented to person, place, and time.      Motor: Motor function is intact.   Psychiatric:         Mood and Affect: Mood normal.         Thought Content: Thought content normal.            Result Review :                 Assessment and Plan    Diagnoses and all orders for this visit:    1. Malignant neoplasm of left breast in female, estrogen receptor positive, unspecified site of breast (Primary)  -     Ambulatory Referral to Radiation Oncology      Followup one year  Refer to rad onc  Keep apt with oncology    Follow Up   Return in about 1 year (around 12/18/2025).  Patient was given instructions and counseling regarding her condition or for health maintenance advice. Please see specific information pulled into the AVS if appropriate.

## 2024-12-20 ENCOUNTER — HOSPITAL ENCOUNTER (OUTPATIENT)
Dept: BONE DENSITY | Facility: HOSPITAL | Age: 74
Discharge: HOME | End: 2024-12-20
Payer: MEDICARE

## 2024-12-20 DIAGNOSIS — Z78.0 POST-MENOPAUSAL: ICD-10-CM

## 2024-12-20 PROCEDURE — 77080 DXA BONE DENSITY AXIAL: CPT

## 2024-12-28 LAB
QT INTERVAL: 400 MS
QTC INTERVAL: 412 MS

## 2025-01-10 ENCOUNTER — CONSULT (OUTPATIENT)
Dept: RADIATION ONCOLOGY | Facility: HOSPITAL | Age: 75
End: 2025-01-10
Payer: MEDICARE

## 2025-01-10 ENCOUNTER — HOSPITAL ENCOUNTER (OUTPATIENT)
Dept: RADIATION ONCOLOGY | Facility: HOSPITAL | Age: 75
Setting detail: RADIATION/ONCOLOGY SERIES
End: 2025-01-10
Payer: MEDICARE

## 2025-01-10 VITALS
HEART RATE: 77 BPM | OXYGEN SATURATION: 92 % | DIASTOLIC BLOOD PRESSURE: 84 MMHG | BODY MASS INDEX: 44.15 KG/M2 | SYSTOLIC BLOOD PRESSURE: 151 MMHG | WEIGHT: 241.4 LBS | TEMPERATURE: 98.2 F | RESPIRATION RATE: 18 BRPM

## 2025-01-10 DIAGNOSIS — Z17.0 MALIGNANT NEOPLASM OF UPPER-OUTER QUADRANT OF LEFT BREAST IN FEMALE, ESTROGEN RECEPTOR POSITIVE: Primary | ICD-10-CM

## 2025-01-10 DIAGNOSIS — C50.412 MALIGNANT NEOPLASM OF UPPER-OUTER QUADRANT OF LEFT BREAST IN FEMALE, ESTROGEN RECEPTOR POSITIVE: Primary | ICD-10-CM

## 2025-01-10 PROCEDURE — G0463 HOSPITAL OUTPT CLINIC VISIT: HCPCS | Performed by: RADIOLOGY
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
## 2025-01-17 PROCEDURE — 77263 THER RADIOLOGY TX PLNG CPLX: CPT | Performed by: RADIOLOGY

## 2025-01-24 ENCOUNTER — HOSPITAL ENCOUNTER (OUTPATIENT)
Dept: RADIATION ONCOLOGY | Facility: HOSPITAL | Age: 75
Discharge: HOME | End: 2025-01-24

## 2025-01-24 ENCOUNTER — DOCUMENTATION (OUTPATIENT)
Dept: RADIATION ONCOLOGY | Facility: HOSPITAL | Age: 75
End: 2025-01-24
Payer: MEDICARE

## 2025-01-24 DIAGNOSIS — C50.412 PRIMARY MALIGNANT NEOPLASM OF UPPER OUTER QUADRANT OF FEMALE BREAST, LEFT: ICD-10-CM

## 2025-01-24 PROCEDURE — 77332 RADIATION TREATMENT AID(S): CPT | Performed by: RADIOLOGY

## 2025-01-24 NOTE — PROGRESS NOTES
Diagnosis: breast cancer    Reason for Referral: rounding with new patient at [facility] onc with a high distress screening score. Score was 4 with no concerns indicated    Content of Visit: OSW met with patient and her son to address any concerns with transportation to treatment. Patient stated her son in law will bring her to treatment in a car that her son is loaning to them to use those two weeks. Patient and her son stated there are no barriers to care or unmet needs to address. Patient expressed appreciation for OSW monitoring patient's ability to access transportation to treatment.     Resources/Referrals Provided: None were identified for today's visit.

## 2025-01-30 ENCOUNTER — HOSPITAL ENCOUNTER (OUTPATIENT)
Dept: RADIATION ONCOLOGY | Facility: HOSPITAL | Age: 75
Discharge: HOME | End: 2025-01-30

## 2025-02-02 PROCEDURE — 77300 RADIATION THERAPY DOSE PLAN: CPT | Performed by: RADIOLOGY

## 2025-02-02 PROCEDURE — 77338 DESIGN MLC DEVICE FOR IMRT: CPT | Performed by: RADIOLOGY

## 2025-02-02 PROCEDURE — 77301 RADIOTHERAPY DOSE PLAN IMRT: CPT | Performed by: RADIOLOGY

## 2025-02-03 ENCOUNTER — HOSPITAL ENCOUNTER (OUTPATIENT)
Dept: RADIATION ONCOLOGY | Facility: HOSPITAL | Age: 75
Setting detail: RADIATION/ONCOLOGY SERIES
End: 2025-02-03
Payer: MEDICARE

## 2025-02-04 ENCOUNTER — HOSPITAL ENCOUNTER (OUTPATIENT)
Dept: RADIATION ONCOLOGY | Facility: HOSPITAL | Age: 75
Discharge: HOME | End: 2025-02-04

## 2025-02-04 VITALS
SYSTOLIC BLOOD PRESSURE: 129 MMHG | DIASTOLIC BLOOD PRESSURE: 73 MMHG | WEIGHT: 239.97 LBS | HEART RATE: 75 BPM | RESPIRATION RATE: 16 BRPM | OXYGEN SATURATION: 91 % | BODY MASS INDEX: 43.89 KG/M2

## 2025-02-04 DIAGNOSIS — C50.412 MALIGNANT NEOPLASM OF UPPER-OUTER QUADRANT OF LEFT BREAST IN FEMALE, ESTROGEN RECEPTOR POSITIVE: Primary | ICD-10-CM

## 2025-02-04 DIAGNOSIS — Z17.0 MALIGNANT NEOPLASM OF UPPER-OUTER QUADRANT OF LEFT BREAST IN FEMALE, ESTROGEN RECEPTOR POSITIVE: Primary | ICD-10-CM

## 2025-02-04 LAB
RAD ONC ARIA COURSE ID: NORMAL
RAD ONC ARIA COURSE INTENT: NORMAL
RAD ONC ARIA COURSE LAST TREATMENT DATE: NORMAL
RAD ONC ARIA COURSE START DATE: NORMAL
RAD ONC ARIA COURSE TREATMENT ELAPSED DAYS: 0
RAD ONC ARIA FIRST TREATMENT DATE: NORMAL
RAD ONC ARIA PLAN FRACTIONS TREATED TO DATE: 1
RAD ONC ARIA PLAN ID: NORMAL
RAD ONC ARIA PLAN PRESCRIBED DOSE PER FRACTION: 6 GY
RAD ONC ARIA PLAN PRIMARY REFERENCE POINT: NORMAL
RAD ONC ARIA PLAN TOTAL FRACTIONS PRESCRIBED: 5
RAD ONC ARIA PLAN TOTAL PRESCRIBED DOSE: 3000 CGY
RAD ONC ARIA REFERENCE POINT DOSAGE GIVEN TO DATE: 6 GY
RAD ONC ARIA REFERENCE POINT ID: NORMAL
RAD ONC ARIA REFERENCE POINT SESSION DOSAGE GIVEN: 6 GY

## 2025-02-04 PROCEDURE — 77014 CHG CT GUIDANCE RADIATION THERAPY FLDS PLACEMENT: CPT | Performed by: RADIOLOGY

## 2025-02-04 PROCEDURE — 77385: CPT | Performed by: RADIOLOGY

## 2025-02-04 NOTE — PROGRESS NOTES
On Treatment Visit       Patient: Ashtyn Jean Baptiste   YOB: 1950   Medical Record Number: [id number]     Date of Visit  February 4, 2025   Primary Diagnosis:Malignant neoplasm of upper-outer quadrant of left breast in female, estrogen receptor positive [C50.412, Z17.0]           was seen today for an on treatment visit.  She is receiving radiation therapy to the left breast. She  has received 600 cGy in 1 fractions out of a planned dose of 3000 cGy in 5 fractions.     Today on exam the patient is tolerating radiation therapy well and has no new disease or treatment-related complaints.                                           Review of Systems:   Review of Systems   Constitutional:  Positive for fatigue (6/10). Negative for appetite change.   HENT:  Positive for tinnitus (occasionally). Negative for congestion and sore throat.    Respiratory:  Positive for shortness of breath (with exertion). Negative for cough and wheezing.    Cardiovascular:  Negative for chest pain and leg swelling.   Gastrointestinal:  Negative for abdominal pain, constipation, diarrhea, nausea and vomiting.   Genitourinary:  Negative for difficulty urinating, dysuria, frequency and urgency.   Musculoskeletal:  Positive for arthralgias and back pain (ongoing). Negative for neck pain.   Neurological:  Positive for light-headedness (with fast posture changes) and headaches (occasional). Negative for dizziness and weakness.   Psychiatric/Behavioral:  Negative for sleep disturbance.        Vitals:     Vitals:    02/04/25 1505   BP: 129/73   Pulse: 75   Resp: 16   SpO2: 91%       Weight:   Wt Readings from Last 3 Encounters:   02/04/25 109 kg (239 lb 15.5 oz)   01/10/25 110 kg (241 lb 6.5 oz)   12/10/24 111 kg (244 lb 7.8 oz)      Pain:    Pain Score    02/04/25 1505   PainSc:   8   PainLoc: Back  Comment: lower back and hips, ongoing         Physical Exam:  Gen: WD/WN; NAD  HEENT: MMM  Trachea: midline  Chest: symmetric  Resp:  normal respiratory effort  Extr: warm, well-perfused  Neuro: awake and alert; no aphasia or neglect    Plan: I have reviewed treatment setup notes, checked and approved the daily guidance images.  I reviewed dose delivery, treatment parameters and deemed them appropriate. We plan to continue radiation therapy as prescribed.          Radiation Oncology    Electronically signed by Darrel Goss MD 2/4/2025  15:25 EST

## 2025-02-06 ENCOUNTER — EDUCATION (OUTPATIENT)
Dept: RADIATION ONCOLOGY | Facility: HOSPITAL | Age: 75
End: 2025-02-06
Payer: MEDICARE

## 2025-02-06 ENCOUNTER — HOSPITAL ENCOUNTER (OUTPATIENT)
Dept: RADIATION ONCOLOGY | Facility: HOSPITAL | Age: 75
Discharge: HOME | End: 2025-02-06

## 2025-02-06 LAB
RAD ONC ARIA COURSE ID: NORMAL
RAD ONC ARIA COURSE INTENT: NORMAL
RAD ONC ARIA COURSE LAST TREATMENT DATE: NORMAL
RAD ONC ARIA COURSE START DATE: NORMAL
RAD ONC ARIA COURSE TREATMENT ELAPSED DAYS: 2
RAD ONC ARIA FIRST TREATMENT DATE: NORMAL
RAD ONC ARIA PLAN FRACTIONS TREATED TO DATE: 2
RAD ONC ARIA PLAN ID: NORMAL
RAD ONC ARIA PLAN PRESCRIBED DOSE PER FRACTION: 6 GY
RAD ONC ARIA PLAN PRIMARY REFERENCE POINT: NORMAL
RAD ONC ARIA PLAN TOTAL FRACTIONS PRESCRIBED: 5
RAD ONC ARIA PLAN TOTAL PRESCRIBED DOSE: 3000 CGY
RAD ONC ARIA REFERENCE POINT DOSAGE GIVEN TO DATE: 12 GY
RAD ONC ARIA REFERENCE POINT ID: NORMAL
RAD ONC ARIA REFERENCE POINT SESSION DOSAGE GIVEN: 6 GY

## 2025-02-06 PROCEDURE — 77014 CHG CT GUIDANCE RADIATION THERAPY FLDS PLACEMENT: CPT | Performed by: RADIOLOGY

## 2025-02-06 PROCEDURE — 77385: CPT | Performed by: RADIOLOGY

## 2025-02-06 NOTE — PROGRESS NOTES
"PATIENT NAME: Ashtyn Jean Baptiste    MRN: [id number]    DX: Breast    CURRENT FRACTIONS AT TEACHIN/5    EDUCATION GIVEN:    Patient education performed today in clinic.  Education folder with handouts given:    --- Radiation Therapy contact numbers for Main Line, Nurse Line, Treatment area,  and Dietician with instruction on when to use each one.   --- Radiation Therapy packet with site specific information.   --- Radiation Therapy bulleted points  --- Radiation Oncology skin care  --- Fatigue/Energy Conservation Technique Guidelines  --- Short and Long Term Symptoms Management \"Bubble\" Sheet (Breast)  --- Nutritional Guidelines for Breast Cancer Patients    Discussed all handouts. Time given for patient to ask questions.  All questions answered to patient satisfaction.  No further needs or concerns at this time.     OTHER:  Son present for patient education. 1-2 Cream and Aquaphor packets given during encounter with instruction on how/when to use.     "

## 2025-02-07 ENCOUNTER — LAB (OUTPATIENT)
Dept: ONCOLOGY | Facility: HOSPITAL | Age: 75
End: 2025-02-07
Payer: MEDICARE

## 2025-02-07 DIAGNOSIS — E55.9 VITAMIN D DEFICIENCY: ICD-10-CM

## 2025-02-07 DIAGNOSIS — C50.912 MALIGNANT NEOPLASM OF LEFT BREAST IN FEMALE, ESTROGEN RECEPTOR POSITIVE, UNSPECIFIED SITE OF BREAST: ICD-10-CM

## 2025-02-07 DIAGNOSIS — Z17.0 MALIGNANT NEOPLASM OF LEFT BREAST IN FEMALE, ESTROGEN RECEPTOR POSITIVE, UNSPECIFIED SITE OF BREAST: ICD-10-CM

## 2025-02-07 LAB
25(OH)D3 SERPL-MCNC: 36.2 NG/ML (ref 30–100)
ALBUMIN SERPL-MCNC: 3.9 G/DL (ref 3.5–5.2)
ALBUMIN/GLOB SERPL: 1 G/DL
ALP SERPL-CCNC: 149 U/L (ref 39–117)
ALT SERPL W P-5'-P-CCNC: 12 U/L (ref 1–33)
ANION GAP SERPL CALCULATED.3IONS-SCNC: 11 MMOL/L (ref 5–15)
AST SERPL-CCNC: 18 U/L (ref 1–32)
BASOPHILS # BLD AUTO: 0.04 10*3/MM3 (ref 0–0.2)
BASOPHILS NFR BLD AUTO: 0.5 % (ref 0–1.5)
BILIRUB SERPL-MCNC: 0.6 MG/DL (ref 0–1.2)
BUN SERPL-MCNC: 33 MG/DL (ref 8–23)
BUN/CREAT SERPL: 23.7 (ref 7–25)
CALCIUM SPEC-SCNC: 9 MG/DL (ref 8.6–10.5)
CHLORIDE SERPL-SCNC: 101 MMOL/L (ref 98–107)
CO2 SERPL-SCNC: 28 MMOL/L (ref 22–29)
CREAT SERPL-MCNC: 1.39 MG/DL (ref 0.57–1)
DEPRECATED RDW RBC AUTO: 48.4 FL (ref 37–54)
EGFRCR SERPLBLD CKD-EPI 2021: 40.2 ML/MIN/1.73
EOSINOPHIL # BLD AUTO: 0.06 10*3/MM3 (ref 0–0.4)
EOSINOPHIL NFR BLD AUTO: 0.7 % (ref 0.3–6.2)
ERYTHROCYTE [DISTWIDTH] IN BLOOD BY AUTOMATED COUNT: 15 % (ref 12.3–15.4)
GLOBULIN UR ELPH-MCNC: 4.1 GM/DL
GLUCOSE SERPL-MCNC: 121 MG/DL (ref 65–99)
HCT VFR BLD AUTO: 41.9 % (ref 34–46.6)
HGB BLD-MCNC: 13.2 G/DL (ref 12–15.9)
IMM GRANULOCYTES # BLD AUTO: 0.02 10*3/MM3 (ref 0–0.05)
IMM GRANULOCYTES NFR BLD AUTO: 0.2 % (ref 0–0.5)
LYMPHOCYTES # BLD AUTO: 1.39 10*3/MM3 (ref 0.7–3.1)
LYMPHOCYTES NFR BLD AUTO: 15.9 % (ref 19.6–45.3)
MCH RBC QN AUTO: 27.8 PG (ref 26.6–33)
MCHC RBC AUTO-ENTMCNC: 31.5 G/DL (ref 31.5–35.7)
MCV RBC AUTO: 88.4 FL (ref 79–97)
MONOCYTES # BLD AUTO: 0.63 10*3/MM3 (ref 0.1–0.9)
MONOCYTES NFR BLD AUTO: 7.2 % (ref 5–12)
NEUTROPHILS NFR BLD AUTO: 6.58 10*3/MM3 (ref 1.7–7)
NEUTROPHILS NFR BLD AUTO: 75.5 % (ref 42.7–76)
NRBC BLD AUTO-RTO: 0 /100 WBC (ref 0–0.2)
PLATELET # BLD AUTO: 251 10*3/MM3 (ref 140–450)
PMV BLD AUTO: 9 FL (ref 6–12)
POTASSIUM SERPL-SCNC: 3.6 MMOL/L (ref 3.5–5.2)
PROT SERPL-MCNC: 8 G/DL (ref 6–8.5)
RBC # BLD AUTO: 4.74 10*6/MM3 (ref 3.77–5.28)
SODIUM SERPL-SCNC: 140 MMOL/L (ref 136–145)
WBC NRBC COR # BLD AUTO: 8.72 10*3/MM3 (ref 3.4–10.8)

## 2025-02-07 PROCEDURE — 36415 COLL VENOUS BLD VENIPUNCTURE: CPT

## 2025-02-07 PROCEDURE — 85025 COMPLETE CBC W/AUTO DIFF WBC: CPT

## 2025-02-07 PROCEDURE — 80053 COMPREHEN METABOLIC PANEL: CPT

## 2025-02-07 PROCEDURE — 82306 VITAMIN D 25 HYDROXY: CPT

## 2025-02-10 ENCOUNTER — HOSPITAL ENCOUNTER (OUTPATIENT)
Dept: RADIATION ONCOLOGY | Facility: HOSPITAL | Age: 75
Discharge: HOME | End: 2025-02-10
Payer: MEDICARE

## 2025-02-10 LAB
RAD ONC ARIA COURSE ID: NORMAL
RAD ONC ARIA COURSE INTENT: NORMAL
RAD ONC ARIA COURSE LAST TREATMENT DATE: NORMAL
RAD ONC ARIA COURSE START DATE: NORMAL
RAD ONC ARIA COURSE TREATMENT ELAPSED DAYS: 6
RAD ONC ARIA FIRST TREATMENT DATE: NORMAL
RAD ONC ARIA PLAN FRACTIONS TREATED TO DATE: 3
RAD ONC ARIA PLAN ID: NORMAL
RAD ONC ARIA PLAN PRESCRIBED DOSE PER FRACTION: 6 GY
RAD ONC ARIA PLAN PRIMARY REFERENCE POINT: NORMAL
RAD ONC ARIA PLAN TOTAL FRACTIONS PRESCRIBED: 5
RAD ONC ARIA PLAN TOTAL PRESCRIBED DOSE: 3000 CGY
RAD ONC ARIA REFERENCE POINT DOSAGE GIVEN TO DATE: 18 GY
RAD ONC ARIA REFERENCE POINT ID: NORMAL
RAD ONC ARIA REFERENCE POINT SESSION DOSAGE GIVEN: 6 GY

## 2025-02-10 PROCEDURE — 77385: CPT | Performed by: RADIOLOGY

## 2025-02-10 PROCEDURE — 77014 CHG CT GUIDANCE RADIATION THERAPY FLDS PLACEMENT: CPT | Performed by: RADIOLOGY

## 2025-02-12 ENCOUNTER — HOSPITAL ENCOUNTER (OUTPATIENT)
Dept: RADIATION ONCOLOGY | Facility: HOSPITAL | Age: 75
Discharge: HOME | End: 2025-02-12

## 2025-02-12 LAB
RAD ONC ARIA COURSE ID: NORMAL
RAD ONC ARIA COURSE INTENT: NORMAL
RAD ONC ARIA COURSE LAST TREATMENT DATE: NORMAL
RAD ONC ARIA COURSE START DATE: NORMAL
RAD ONC ARIA COURSE TREATMENT ELAPSED DAYS: 8
RAD ONC ARIA FIRST TREATMENT DATE: NORMAL
RAD ONC ARIA PLAN FRACTIONS TREATED TO DATE: 4
RAD ONC ARIA PLAN ID: NORMAL
RAD ONC ARIA PLAN PRESCRIBED DOSE PER FRACTION: 6 GY
RAD ONC ARIA PLAN PRIMARY REFERENCE POINT: NORMAL
RAD ONC ARIA PLAN TOTAL FRACTIONS PRESCRIBED: 5
RAD ONC ARIA PLAN TOTAL PRESCRIBED DOSE: 3000 CGY
RAD ONC ARIA REFERENCE POINT DOSAGE GIVEN TO DATE: 24 GY
RAD ONC ARIA REFERENCE POINT ID: NORMAL
RAD ONC ARIA REFERENCE POINT SESSION DOSAGE GIVEN: 6 GY

## 2025-02-12 PROCEDURE — 77385: CPT | Performed by: RADIOLOGY

## 2025-02-14 ENCOUNTER — HOSPITAL ENCOUNTER (OUTPATIENT)
Dept: RADIATION ONCOLOGY | Facility: HOSPITAL | Age: 75
Discharge: HOME | End: 2025-02-14

## 2025-02-14 LAB
RAD ONC ARIA COURSE ID: NORMAL
RAD ONC ARIA COURSE INTENT: NORMAL
RAD ONC ARIA COURSE LAST TREATMENT DATE: NORMAL
RAD ONC ARIA COURSE START DATE: NORMAL
RAD ONC ARIA COURSE TREATMENT ELAPSED DAYS: 10
RAD ONC ARIA FIRST TREATMENT DATE: NORMAL
RAD ONC ARIA PLAN FRACTIONS TREATED TO DATE: 5
RAD ONC ARIA PLAN ID: NORMAL
RAD ONC ARIA PLAN PRESCRIBED DOSE PER FRACTION: 6 GY
RAD ONC ARIA PLAN PRIMARY REFERENCE POINT: NORMAL
RAD ONC ARIA PLAN TOTAL FRACTIONS PRESCRIBED: 5
RAD ONC ARIA PLAN TOTAL PRESCRIBED DOSE: 3000 CGY
RAD ONC ARIA REFERENCE POINT DOSAGE GIVEN TO DATE: 30 GY
RAD ONC ARIA REFERENCE POINT ID: NORMAL
RAD ONC ARIA REFERENCE POINT SESSION DOSAGE GIVEN: 6 GY

## 2025-02-14 PROCEDURE — 77385: CPT | Performed by: RADIOLOGY

## 2025-02-14 PROCEDURE — 77336 RADIATION PHYSICS CONSULT: CPT | Performed by: RADIOLOGY

## 2025-02-14 PROCEDURE — 77014 CHG CT GUIDANCE RADIATION THERAPY FLDS PLACEMENT: CPT | Performed by: RADIOLOGY

## 2025-02-26 ENCOUNTER — TELEPHONE (OUTPATIENT)
Dept: ONCOLOGY | Facility: HOSPITAL | Age: 75
End: 2025-02-26

## 2025-02-26 NOTE — TELEPHONE ENCOUNTER
Caller: Ashtyn Jean Baptiste    Relationship to patient: Self    Best call back number: [phone]     Chief complaint: R/S    Type of visit: FOLLOW UP    Requested date: NEEDS LATER AFTERNOON ON A FRIDAY, PLEASE CALL PT TO R/S     If rescheduling, when is the original appointment: 3/ 19

## 2025-02-27 ENCOUNTER — PATIENT ROUNDING (BHMG ONLY) (OUTPATIENT)
Dept: RADIATION ONCOLOGY | Facility: HOSPITAL | Age: 75
End: 2025-02-27
Payer: MEDICARE

## 2025-02-27 NOTE — PROGRESS NOTES
"Patient completed radiation treatment for breast cancer on 2/14/25, receiving 5/5 fractions. Following up with patient regarding any concerns the patient may have at this time and receiving feedback in regards to patient over all care while under treatment.     Patient asked about symptoms and side effects that continue to be bothersome. Ms. Jean Baptiste informs that she is doing well and has no complaints.  She denies any skin irritation, redness, peeling or open areas.  She states that she is moisturizing but not everyday.  She denies fatigue and denies pain associated with her radiation treatments.     No medication changes reported. No refills requested.    Patient was asked if there was anything that could've made their experience better at [facility] while they were under treatment. Patient states: \"oh no, you all were great.\"    Patient encouraged to call the office if any concerns arise. Patient reminded of follow up appointment on 3/28/25 with ROSA Johns at 230.  "

## 2025-03-14 ENCOUNTER — OFFICE VISIT (OUTPATIENT)
Dept: ONCOLOGY | Facility: HOSPITAL | Age: 75
End: 2025-03-14
Payer: MEDICARE

## 2025-03-14 VITALS
BODY MASS INDEX: 44.42 KG/M2 | OXYGEN SATURATION: 90 % | WEIGHT: 241.4 LBS | TEMPERATURE: 97.8 F | HEIGHT: 62 IN | SYSTOLIC BLOOD PRESSURE: 132 MMHG | HEART RATE: 71 BPM | RESPIRATION RATE: 20 BRPM | DIASTOLIC BLOOD PRESSURE: 60 MMHG

## 2025-03-14 DIAGNOSIS — Z17.0 MALIGNANT NEOPLASM OF LEFT BREAST IN FEMALE, ESTROGEN RECEPTOR POSITIVE, UNSPECIFIED SITE OF BREAST: Primary | ICD-10-CM

## 2025-03-14 DIAGNOSIS — M81.0 OSTEOPOROSIS, UNSPECIFIED OSTEOPOROSIS TYPE, UNSPECIFIED PATHOLOGICAL FRACTURE PRESENCE: ICD-10-CM

## 2025-03-14 DIAGNOSIS — C50.912 MALIGNANT NEOPLASM OF LEFT BREAST IN FEMALE, ESTROGEN RECEPTOR POSITIVE, UNSPECIFIED SITE OF BREAST: Primary | ICD-10-CM

## 2025-03-14 RX ORDER — ANASTROZOLE 1 MG/1
1 TABLET ORAL DAILY
Qty: 30 TABLET | Refills: 0 | Status: SHIPPED | OUTPATIENT
Start: 2025-03-14
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
## 2025-03-27 LAB
CYTO UR: NORMAL
LAB AP CASE REPORT: NORMAL
LAB AP CLINICAL INFORMATION: NORMAL
LAB AP DIAGNOSIS COMMENT: NORMAL
LAB AP SYNOPTIC CHECKLIST: NORMAL
PATH REPORT.ADDENDUM SPEC: NORMAL
PATH REPORT.FINAL DX SPEC: NORMAL
PATH REPORT.GROSS SPEC: NORMAL

## 2025-03-28 ENCOUNTER — OFFICE VISIT (OUTPATIENT)
Dept: RADIATION ONCOLOGY | Facility: HOSPITAL | Age: 75
End: 2025-03-28
Payer: MEDICARE

## 2025-03-28 VITALS
WEIGHT: 241.18 LBS | RESPIRATION RATE: 16 BRPM | HEART RATE: 71 BPM | BODY MASS INDEX: 44.11 KG/M2 | OXYGEN SATURATION: 95 % | DIASTOLIC BLOOD PRESSURE: 85 MMHG | TEMPERATURE: 97.2 F | SYSTOLIC BLOOD PRESSURE: 109 MMHG

## 2025-03-28 DIAGNOSIS — Z91.89 AT RISK FOR LYMPHEDEMA: ICD-10-CM

## 2025-03-28 DIAGNOSIS — Z08 ENCOUNTER FOR FOLLOW-UP EXAMINATION AFTER COMPLETED TREATMENT FOR MALIGNANT NEOPLASM: ICD-10-CM

## 2025-03-28 DIAGNOSIS — Z17.0 MALIGNANT NEOPLASM OF UPPER-OUTER QUADRANT OF LEFT BREAST IN FEMALE, ESTROGEN RECEPTOR POSITIVE: Primary | ICD-10-CM

## 2025-03-28 DIAGNOSIS — C50.412 MALIGNANT NEOPLASM OF UPPER-OUTER QUADRANT OF LEFT BREAST IN FEMALE, ESTROGEN RECEPTOR POSITIVE: Primary | ICD-10-CM

## 2025-03-28 DIAGNOSIS — Z79.811 USE OF ANASTROZOLE (ARIMIDEX): ICD-10-CM

## 2025-03-28 DIAGNOSIS — Z92.3 STATUS POST RADIATION THERAPY WITHIN FOUR TO TWELVE WEEKS: ICD-10-CM

## 2025-03-28 PROCEDURE — G0463 HOSPITAL OUTPT CLINIC VISIT: HCPCS | Performed by: NURSE PRACTITIONER

## 2025-03-28 RX ORDER — ATORVASTATIN CALCIUM 80 MG/1
TABLET, FILM COATED ORAL
COMMUNITY
Start: 2025-02-22

## 2025-03-28 RX ORDER — FUROSEMIDE 80 MG/1
TABLET ORAL
COMMUNITY
Start: 2025-02-22

## 2025-03-28 NOTE — PROGRESS NOTES
Follow Up Office Visit      Encounter Date: 03/28/2025   Patient Name: Ashtyn Jean Baptiste  YOB: 1950   Medical Record Number: [id number]   Primary Diagnosis: Malignant neoplasm of upper-outer quadrant of left breast in female, estrogen receptor positive [C50.412, Z17.0]     Cancer Staging   No matching staging information was found for the patient.    Radiation Completion Date:  2/14/2025 APBI left breast     Chief Complaint:    Chief Complaint   Patient presents with    Follow-up    Breast Cancer       Oncology/Hematology History   Primary malignant neoplasm of upper outer quadrant of female breast, left   12/10/2024 Cancer Staged    Staging form: Breast, AJCC 8th Edition  - Pathologic stage from 12/10/2024: Stage IA (pT1b, pN0, cM0, G1, ER+, NE+, HER2-, Oncotype DX score: 4) - Signed by Wanda Carvalho APRN on 3/28/2025     1/24/2025 Initial Diagnosis    Primary malignant neoplasm of upper outer quadrant of female breast, left     2/4/2025 - 2/14/2025 Radiation    Radiation OncologyTreatment Course:  Ashtyn Jean Baptiste received 3000 cGy in 5 fractions to the left breast via APBI.          History of Present Illness: Ashtyn JeanB aptiste is a 74 y.o. female who returns to [facility] for short interval follow-up after completing accelerated partial breast irradiation on 2/14/25.    History of Present Illness  She reports a satisfactory response to radiation therapy, with no adverse skin reactions such as rashes or open wounds. However, she experiences intermittent pain in the  left nipple area, which she likens to a toothache. There are no changes in the nipple or any discharge. She denies any limitations with mobility in her left arm. She is currently on anastrozole, prescribed by Dr. Colon, which she tolerates well. She is not experiencing any hot flashes or night sweats. She has a history of fibrocystic breast disease.    FAMILY HISTORY  Her sister had breast cancer 6 years ago and underwent  36 radiation treatments.    MEDICATIONS  Anastrozole    Subjective      Review of Systems: Review of Systems   Constitutional:  Positive for fatigue (4/10). Negative for appetite change, chills, fever and unexpected weight change.   HENT:  Positive for tinnitus (occasionally). Negative for sore throat and trouble swallowing.    Respiratory:  Positive for chest tightness (Lower midsternal, ongoing, comes and goes, slight increase in soa with tightness, states that moves up her chest, into her throat and jaws.  Improves with milk.) and shortness of breath (with exertion, ongoing). Negative for cough and wheezing.         Wears 2L of o2 at night.  O2 was 86% on room air, placed on 2L and O2 now 95%.  Patient states that she does not wear o2 throughout the day, states she doesn't need it.    Cardiovascular:  Negative for chest pain and palpitations.   Gastrointestinal:  Negative for constipation, diarrhea, nausea and vomiting.        States that she has a stomach ache everyday, goes away on its own, believes to be r/t medications.   Genitourinary:  Positive for frequency. Negative for difficulty urinating, dysuria and urgency.   Musculoskeletal:  Positive for arthralgias (Left hip and generalized aches, ongoing) and back pain (ongoing). Negative for neck pain.        Normal ROM in left arm.  Occasional left breast/nipple pain, noted after surgery, ongoing.  Pain noted to left axilla, ongoing since surgery.   Skin:  Negative for color change and rash.   Neurological:  Positive for light-headedness (Noted with positional changes, ongoing) and headaches (occasional, ongoing). Negative for dizziness, tremors, seizures, syncope, speech difficulty and numbness.   Psychiatric/Behavioral:  Negative for sleep disturbance.        The following portions of the patient's history were reviewed and updated as appropriate: allergies, current medications, past family history, past medical history, past social history, past surgical  history and problem list.    Medications:     Current Outpatient Medications:     albuterol sulfate  (90 Base) MCG/ACT inhaler, Inhale 2 puffs Every 4 (Four) Hours As Needed for Wheezing., Disp: , Rfl:     amLODIPine (NORVASC) 5 MG tablet, Take 1 tablet by mouth Daily., Disp: , Rfl:     anastrozole (ARIMIDEX) 1 MG tablet, Take 1 tablet by mouth Daily., Disp: 30 tablet, Rfl: 0    arformoterol (BROVANA) 15 MCG/2ML nebulizer solution, Take 2 mL by nebulization 2 (Two) Times a Day., Disp: 120 mL, Rfl: 0    aspirin 81 MG EC tablet, Take 1 tablet by mouth Daily., Disp: , Rfl:     atenolol (TENORMIN) 25 MG tablet, Take 1 tablet by mouth Daily., Disp: , Rfl:     atorvastatin (LIPITOR) 80 MG tablet, , Disp: , Rfl:     budesonide-formoterol (SYMBICORT) 160-4.5 MCG/ACT inhaler, Inhale 2 puffs., Disp: , Rfl:     dicyclomine (BENTYL) 20 MG tablet, Take 1 tablet by mouth Daily., Disp: , Rfl:     furosemide (LASIX) 80 MG tablet, , Disp: , Rfl:     Lidocaine (ZTlido) 1.8 %, ZTlido 1.8 % topical patch  APPLY 1 PATCH BY TOPICAL ROUTE ONCE DAILY (MAY WEAR UP TO 12HOURS.), Disp: , Rfl:     omeprazole (priLOSEC) 40 MG capsule, Take 1 capsule by mouth Daily., Disp: , Rfl:     oxyCODONE-acetaminophen (PERCOCET) 7.5-325 MG per tablet, Take 1 tablet by mouth Every 8 (Eight) Hours As Needed., Disp: , Rfl:     tiotropium bromide monohydrate (SPIRIVA RESPIMAT) 2.5 MCG/ACT aerosol solution inhaler, Inhale 2 puffs Daily., Disp: , Rfl:     traZODone (DESYREL) 50 MG tablet, , Disp: , Rfl:     budesonide (PULMICORT) 0.5 MG/2ML nebulizer solution, Take 2 mL by nebulization 2 (Two) Times a Day for 30 days., Disp: 120 mL, Rfl: 0    clotrimazole-betamethasone (LOTRISONE) 1-0.05 % cream, Apply 1 Application topically to the appropriate area as directed 2 (Two) Times a Day., Disp: 45 g, Rfl: 0    escitalopram (LEXAPRO) 20 MG tablet, Take 1 tablet by mouth Daily., Disp: , Rfl:     ezetimibe (ZETIA) 10 MG tablet, Take 1 tablet by mouth Daily., Disp:  , Rfl:     gabapentin (NEURONTIN) 800 MG tablet, Take 1 tablet by mouth 3 (Three) Times a Day., Disp: , Rfl:     HYDROcodone-acetaminophen (NORCO) 5-325 MG per tablet, Take 1 tablet by mouth Every 6 (Six) Hours As Needed for Moderate Pain (Pain). (Patient not taking: Reported on 3/28/2025), Disp: 20 tablet, Rfl: 0    nystatin (MYCOSTATIN) 563808 UNIT/GM cream, apply one application topically to the appropriate areas as directed TWICE DAILY for 14 days, Disp: , Rfl:     Allergies:   Allergies   Allergen Reactions    Avocado Shortness Of Breath    Codeine Nausea And Vomiting, Hives and Shortness Of Breath     HIVES, SOA, ELEV HR    Latex Hives     GENERALIZED RASH/ HIVES FROM HEAD TO TOE    Morphine Nausea And Vomiting       Patient Smoking History:   Social History     Tobacco Use   Smoking Status Former    Current packs/day: 0.00    Average packs/day: 1 pack/day for 40.0 years (40.0 ttl pk-yrs)    Types: Cigarettes    Start date:     Quit date:     Years since quittin.2    Passive exposure: Past   Smokeless Tobacco Never       Measures:  PHQ-9 Total Score: 5   Quality of Life: 80 - Restricted Physical Activity   ECOG score: 2  ECOG: (2) Ambulatory and capable of self care, unable to carry out work activity, up and about > 50% or waking hours  Pain: (on a scale of 0-10)   Pain Score    25 1424   PainSc: 8    PainLoc: Hip  Comment: Left Hip, ongoing     Ashtyn Jean Baptiste reports a pain score of 8.  Given her pain assessment as noted, treatment options were discussed and the following options were decided upon as a follow-up plan to address the patient's pain: use of non-medical modalities (ice, heat, stretching and/or behavior modifications).     Objective     Physical Exam:   Vital Signs:   Vitals:    25 1424   BP: 109/85   Pulse: 71   Resp: 16   Temp: 97.2 °F (36.2 °C)   TempSrc: Temporal   SpO2: 95%  Comment: Pt was 86% on RA, placed on 2L NC increased to 95%   Weight: 109 kg (241 lb 2.9 oz)    PainSc: 8    PainLoc: Hip  Comment: Left Hip, ongoing     Body mass index is 44.11 kg/m².   Wt Readings from Last 3 Encounters:   03/28/25 109 kg (241 lb 2.9 oz)   03/14/25 109 kg (241 lb 6.4 oz)   02/04/25 109 kg (239 lb 15.5 oz)       Physical Exam  Vitals reviewed.   Constitutional:       General: She is not in acute distress.     Appearance: Normal appearance. She is normal weight. She is not ill-appearing.      Comments: Sitting comfortably in wheelchair   HENT:      Head: Normocephalic and atraumatic.   Eyes:      Conjunctiva/sclera: Conjunctivae normal.      Pupils: Pupils are equal, round, and reactive to light.   Cardiovascular:      Rate and Rhythm: Normal rate and regular rhythm.      Pulses: Normal pulses.      Heart sounds: Normal heart sounds.   Pulmonary:      Effort: Pulmonary effort is normal. No respiratory distress.      Breath sounds: Normal breath sounds.   Chest:   Breasts:     Right: No swelling, bleeding, inverted nipple, mass, nipple discharge, skin change or tenderness.      Left: No swelling, bleeding, inverted nipple, mass, nipple discharge, skin change or tenderness.       Musculoskeletal:         General: Normal range of motion.      Cervical back: Normal range of motion.      Comments: Full AROM LUE   Lymphadenopathy:      Upper Body:      Right upper body: No supraclavicular or axillary adenopathy.      Left upper body: No supraclavicular or axillary adenopathy.   Skin:     General: Skin is warm and dry.   Neurological:      General: No focal deficit present.      Mental Status: She is alert and oriented to person, place, and time. Mental status is at baseline.   Psychiatric:         Mood and Affect: Mood normal.         Behavior: Behavior normal.       Result Review: I independently reviewed the following data.   Results      Pathology:   Lab Results   Component Value Date    CLININFO  12/10/2024     Malignant neoplasm of left breast in female, estrogen receptor positive,  unspecified site of breast      FINALDX  12/10/2024     1. Left breast sentinel node #1, count 27, excision:   - One lymph node negative for carcinoma (0/1)   - See synoptic checklist      2. Left sentinel node #2, count 140, excision:   - One lymph node negative for carcinoma (0/1)   - See synoptic checklist      3. Left breast mass, excision:   - Invasive carcinoma of no special type (ductal)   - Lobular carcinoma in situ (LCIS), classic type   - Atypical ductal hyperplasia (ADH)   - Gynecomastoid hyperplasia   - Usual ductal hyperplasia (UDH)   - Apocrine cysts   - Intraductal micro papilloma   - Biopsy site changes   - See synoptic checklist      SYNOPTIC  12/10/2024     INVASIVE CARCINOMA OF THE BREAST: Resection  INVASIVE CARCINOMA OF THE BREAST: RESECTION - All Specimens  8th Edition - Protocol posted: 6/19/2024    SPECIMEN     Procedure:    Excision (less than total mastectomy)      Specimen Laterality:    Left     TUMOR     Histologic Type:    Invasive carcinoma of no special type (ductal)      Histologic Grade ([place] Histologic Score):           Glandular (Acinar) / Tubular Differentiation:    Score 1        Nuclear Pleomorphism:    Score 1        Mitotic Rate:    Score 1        Overall Grade:    Grade 1 (scores of 3, 4 or 5)      Tumor Size:    Greatest dimension of largest invasive focus (Millimeters): 8 mm     Ductal Carcinoma In Situ (DCIS):    Not identified      Lymphatic and / or Vascular Invasion:    Not identified      Treatment Effect in the Breast:    No known presurgical therapy     MARGINS     Margin Status for Invasive Carcinoma:    All margins negative for invasive carcinoma        Distance from Invasive Carcinoma to Closest Margin:    11 mm       Closest Margin(s) to Invasive Carcinoma:    Medial     REGIONAL LYMPH NODES     Regional Lymph Node Status:           :    All regional lymph nodes negative for tumor        Total Number of Lymph Nodes Examined (sentinel and non-sentinel):     2        Number of [place] Nodes Examined:    2     pTNM CLASSIFICATION (AJCC 8th Edition)     Reporting of pT, pN, and (when applicable) pM categories is based on information available to the pathologist at the time the report is issued. As per the AJCC (Chapter 1, 8th Ed.) it is the managing physician's responsibility to establish the final pathologic stage based upon all pertinent information, including but potentially not limited to this pathology report.     pT Category:    pT1b      pN Category:    pN0      N Suffix:    (sn)     SPECIAL STUDIES     Estrogen Receptor (ER) Status:    Positive (greater than 10% of cells demonstrate nuclear positivity)        Percentage of Cells with Nuclear Positivity:    100 %     Progesterone Receptor (PgR) Status:    Positive        Percentage of Cells with Nuclear Positivity:    100 %     HER2 (by immunohistochemistry):    Negative (Score 1+)      Ki-67 Percentage of Positive Nuclei:    12 %     Testing Performed on Case Number:    [id number]        Imaging: No radiology results for the last 90 days.     Labs:   WBC   Date Value Ref Range Status   02/07/2025 8.72 3.40 - 10.80 10*3/mm3 Final   10/02/2023 10.51 4.5 - 11.0 10*3/uL Final     Hemoglobin   Date Value Ref Range Status   02/07/2025 13.2 12.0 - 15.9 g/dL Final   10/02/2023 13.3 12.0 - 16.0 g/dL Final     Hematocrit   Date Value Ref Range Status   02/07/2025 41.9 34.0 - 46.6 % Final   10/02/2023 42.8 36.0 - 46.0 % Final     Platelets   Date Value Ref Range Status   02/07/2025 251 140 - 450 10*3/mm3 Final   10/02/2023 286 140 - 440 10*3/uL Final     Creatinine   Date Value Ref Range Status   02/07/2025 1.39 (H) 0.57 - 1.00 mg/dL Final   12/07/2022 1.50 mg/dL Final     Comment:     Serial Number: [id number]:  [id number]   11/11/2022 1.09 (H) 0.55 - 1.02 mg/dL Final     BUN   Date Value Ref Range Status   02/07/2025 33 (H) 8 - 23 mg/dL Final   11/11/2022 28 (H) 10 - 20 mg/dL Final     eGFR   Date Value Ref Range Status    02/07/2025 40.2 (L) >60.0 mL/min/1.73 Final     TSH   Date Value Ref Range Status   12/31/2020 0.852 0.27 - 4.20 u(iU)/mL Final     Assessment / Plan      Impression: Ashtyn Jean Baptiste is a pleasant 74 y.o. female with cT1b cN0 cM0 invasive ductal carcinoma of the left breast, grade 1, ER/KS positive, HER2 negative with Ki-67 of 12%. Oncotype DX score of 4. She is status post left lumpectomy and SLNB on 12/10/2024; pT1b pN0. Margins negative. Total of 2 lymph nodes removed and both were negative. She is status post accelerated partial breast irradiation to the left breast, completed on 2/14/2025. Received 3000 cGy in 5 fractions. She is doing well overall and remains without evidence of disease clinically.     Assessment/Plan:   Diagnoses and all orders for this visit:    1. Malignant neoplasm of upper-outer quadrant of left breast in female, estrogen receptor positive (Primary)    2. Status post radiation therapy within four to twelve weeks    3. Encounter for follow-up examination after completed treatment for malignant neoplasm    4. Use of anastrozole (Arimidex)    5. At risk for lymphedema       Assessment & Plan  1. Breast cancer.  She has demonstrated a commendable tolerance to the treatment regimen, with her skin condition remaining optimal. The presence of scar tissue is minimal and does not pose a concern at this juncture. Since it is difficult for her to make it to appointments as she relies on her son for transportation, referral to lymphedema therapy will be deferred unless future concerns arise. She is currently on anastrozole and tolerating it well without any significant side effects. Discussed recommendation per NCCN guidelines for waiting at least 6-months after completion of radiotherapy to begin annual mammogram surveillance.     Follow Up:   Return for follow-up in 6 months.   Follow-up with Dr. Colon on 4/18/25.      Return in about 6 months (around 9/28/2025) for Office Visit.  Ashtyn Jean Baptiste  was encouraged to contact me in the interim with any questions or concerns regarding her care.      ROSA Samaniego  Radiation Oncology  [facility]    This document has been signed by ROSA Johns on March 28, 2025 15:12 EDT     Patient or patient representative verbalized consent for the use of Ambient Listening during the visit with  ROSA Johns for chart documentation. 3/28/2025  14:52 EDT

## 2025-04-03 ENCOUNTER — TELEPHONE (OUTPATIENT)
Dept: ONCOLOGY | Facility: HOSPITAL | Age: 75
End: 2025-04-03
Payer: MEDICARE

## 2025-04-03 NOTE — TELEPHONE ENCOUNTER
left pt a  asking to move f/u with Dr. Colon on 4-18. Pt is coming in office tomorrow, will ask her then

## 2025-04-04 ENCOUNTER — TELEPHONE (OUTPATIENT)
Dept: ONCOLOGY | Facility: HOSPITAL | Age: 75
End: 2025-04-04
Payer: MEDICARE

## 2025-04-04 NOTE — TELEPHONE ENCOUNTER
LEFT MESSAGE FOR PATIENT IN REGARDS TO SCHEDULED APPOINTMENTS FOR TODAY FOR INFUSION. ASKE D FOR PATIENT TO CALL OFFICE BACK TO GET RESCHEDULED.

## 2025-04-15 ENCOUNTER — TELEPHONE (OUTPATIENT)
Dept: ONCOLOGY | Facility: HOSPITAL | Age: 75
End: 2025-04-15
Payer: MEDICARE

## 2025-04-15 NOTE — TELEPHONE ENCOUNTER
LEFT MESSAGE FOR PATIENT IN REGARDS TO LABS/INFUSION, WE HAVE ADDED HER ON FOR SAME DAY AS FOLLOW UP FOR LABS/INFUSION, I ASKED FOR PATIENT TO CALL OFFICE BACK TO CONFIRM APPOINTMENT DATE/TIME.

## 2025-04-18 ENCOUNTER — HOSPITAL ENCOUNTER (OUTPATIENT)
Dept: ONCOLOGY | Facility: HOSPITAL | Age: 75
Discharge: HOME | End: 2025-04-18
Payer: MEDICARE

## 2025-04-18 ENCOUNTER — OFFICE VISIT (OUTPATIENT)
Dept: ONCOLOGY | Facility: HOSPITAL | Age: 75
End: 2025-04-18
Payer: MEDICARE

## 2025-04-18 VITALS
BODY MASS INDEX: 44.05 KG/M2 | HEIGHT: 62 IN | TEMPERATURE: 98.4 F | RESPIRATION RATE: 18 BRPM | HEART RATE: 73 BPM | WEIGHT: 239.4 LBS | OXYGEN SATURATION: 91 % | SYSTOLIC BLOOD PRESSURE: 142 MMHG | DIASTOLIC BLOOD PRESSURE: 65 MMHG

## 2025-04-18 DIAGNOSIS — M81.0 AGE-RELATED OSTEOPOROSIS WITHOUT CURRENT PATHOLOGICAL FRACTURE: Primary | ICD-10-CM

## 2025-04-18 DIAGNOSIS — C50.912 MALIGNANT NEOPLASM OF LEFT BREAST IN FEMALE, ESTROGEN RECEPTOR POSITIVE, UNSPECIFIED SITE OF BREAST: ICD-10-CM

## 2025-04-18 DIAGNOSIS — M81.0 AGE-RELATED OSTEOPOROSIS WITHOUT CURRENT PATHOLOGICAL FRACTURE: ICD-10-CM

## 2025-04-18 DIAGNOSIS — Z17.0 MALIGNANT NEOPLASM OF LEFT BREAST IN FEMALE, ESTROGEN RECEPTOR POSITIVE, UNSPECIFIED SITE OF BREAST: ICD-10-CM

## 2025-04-18 DIAGNOSIS — Z12.31 ENCOUNTER FOR SCREENING MAMMOGRAM FOR BREAST CANCER: Primary | ICD-10-CM

## 2025-04-18 LAB
ALBUMIN SERPL-MCNC: 4.1 G/DL (ref 3.5–5.2)
ALBUMIN/GLOB SERPL: 1 G/DL
ALP SERPL-CCNC: 155 U/L (ref 39–117)
ALT SERPL W P-5'-P-CCNC: 13 U/L (ref 1–33)
ANION GAP SERPL CALCULATED.3IONS-SCNC: 11.2 MMOL/L (ref 5–15)
AST SERPL-CCNC: 22 U/L (ref 1–32)
BASOPHILS # BLD AUTO: 0.03 10*3/MM3 (ref 0–0.2)
BASOPHILS NFR BLD AUTO: 0.3 % (ref 0–1.5)
BILIRUB SERPL-MCNC: 0.5 MG/DL (ref 0–1.2)
BUN SERPL-MCNC: 33 MG/DL (ref 8–23)
BUN/CREAT SERPL: 28.7 (ref 7–25)
CALCIUM SPEC-SCNC: 9.4 MG/DL (ref 8.6–10.5)
CHLORIDE SERPL-SCNC: 99 MMOL/L (ref 98–107)
CO2 SERPL-SCNC: 27.8 MMOL/L (ref 22–29)
CREAT SERPL-MCNC: 1.15 MG/DL (ref 0.57–1)
DEPRECATED RDW RBC AUTO: 49.6 FL (ref 37–54)
EGFRCR SERPLBLD CKD-EPI 2021: 50.4 ML/MIN/1.73
EOSINOPHIL # BLD AUTO: 0.03 10*3/MM3 (ref 0–0.4)
EOSINOPHIL NFR BLD AUTO: 0.3 % (ref 0.3–6.2)
ERYTHROCYTE [DISTWIDTH] IN BLOOD BY AUTOMATED COUNT: 15.2 % (ref 12.3–15.4)
GLOBULIN UR ELPH-MCNC: 4 GM/DL
GLUCOSE SERPL-MCNC: 120 MG/DL (ref 65–99)
HCT VFR BLD AUTO: 43.2 % (ref 34–46.6)
HGB BLD-MCNC: 13.8 G/DL (ref 12–15.9)
IMM GRANULOCYTES # BLD AUTO: 0.04 10*3/MM3 (ref 0–0.05)
IMM GRANULOCYTES NFR BLD AUTO: 0.4 % (ref 0–0.5)
LYMPHOCYTES # BLD AUTO: 1.54 10*3/MM3 (ref 0.7–3.1)
LYMPHOCYTES NFR BLD AUTO: 16.8 % (ref 19.6–45.3)
MAGNESIUM SERPL-MCNC: 2.3 MG/DL (ref 1.6–2.4)
MCH RBC QN AUTO: 28.6 PG (ref 26.6–33)
MCHC RBC AUTO-ENTMCNC: 31.9 G/DL (ref 31.5–35.7)
MCV RBC AUTO: 89.4 FL (ref 79–97)
MONOCYTES # BLD AUTO: 0.65 10*3/MM3 (ref 0.1–0.9)
MONOCYTES NFR BLD AUTO: 7.1 % (ref 5–12)
NEUTROPHILS NFR BLD AUTO: 6.86 10*3/MM3 (ref 1.7–7)
NEUTROPHILS NFR BLD AUTO: 75.1 % (ref 42.7–76)
NRBC BLD AUTO-RTO: 0 /100 WBC (ref 0–0.2)
PHOSPHATE SERPL-MCNC: 4.4 MG/DL (ref 2.5–4.5)
PLATELET # BLD AUTO: 292 10*3/MM3 (ref 140–450)
PMV BLD AUTO: 9.3 FL (ref 6–12)
POTASSIUM SERPL-SCNC: 4.7 MMOL/L (ref 3.5–5.2)
PROT SERPL-MCNC: 8.1 G/DL (ref 6–8.5)
RBC # BLD AUTO: 4.83 10*6/MM3 (ref 3.77–5.28)
SODIUM SERPL-SCNC: 138 MMOL/L (ref 136–145)
WBC NRBC COR # BLD AUTO: 9.15 10*3/MM3 (ref 3.4–10.8)

## 2025-04-18 PROCEDURE — 83735 ASSAY OF MAGNESIUM: CPT | Performed by: INTERNAL MEDICINE

## 2025-04-18 PROCEDURE — 96374 THER/PROPH/DIAG INJ IV PUSH: CPT

## 2025-04-18 PROCEDURE — 80053 COMPREHEN METABOLIC PANEL: CPT | Performed by: INTERNAL MEDICINE

## 2025-04-18 PROCEDURE — 25810000003 SODIUM CHLORIDE 0.9 % SOLUTION: Performed by: INTERNAL MEDICINE

## 2025-04-18 PROCEDURE — 85025 COMPLETE CBC W/AUTO DIFF WBC: CPT | Performed by: INTERNAL MEDICINE

## 2025-04-18 PROCEDURE — 84100 ASSAY OF PHOSPHORUS: CPT | Performed by: INTERNAL MEDICINE

## 2025-04-18 PROCEDURE — 25010000002 ZOLEDRONIC ACID 4 MG/100ML SOLUTION: Performed by: INTERNAL MEDICINE

## 2025-04-18 RX ORDER — ANASTROZOLE 1 MG/1
1 TABLET ORAL DAILY
Qty: 90 TABLET | Refills: 1 | Status: SHIPPED | OUTPATIENT
Start: 2025-04-18

## 2025-04-18 RX ORDER — SODIUM CHLORIDE 9 MG/ML
20 INJECTION, SOLUTION INTRAVENOUS ONCE
Status: COMPLETED | OUTPATIENT
Start: 2025-04-18 | End: 2025-04-18

## 2025-04-18 RX ORDER — ZOLEDRONIC ACID 0.04 MG/ML
4 INJECTION, SOLUTION INTRAVENOUS ONCE
Status: CANCELLED | OUTPATIENT
Start: 2025-04-18

## 2025-04-18 RX ORDER — ZOLEDRONIC ACID 0.04 MG/ML
4 INJECTION, SOLUTION INTRAVENOUS ONCE
Status: COMPLETED | OUTPATIENT
Start: 2025-04-18 | End: 2025-04-18

## 2025-04-18 RX ORDER — SODIUM CHLORIDE 9 MG/ML
20 INJECTION, SOLUTION INTRAVENOUS ONCE
Status: CANCELLED | OUTPATIENT
Start: 2025-04-18

## 2025-04-18 RX ADMIN — ZOLEDRONIC ACID 4 MG: 0.04 INJECTION, SOLUTION INTRAVENOUS at 14:07

## 2025-04-18 RX ADMIN — SODIUM CHLORIDE 20 ML/HR: 9 INJECTION, SOLUTION INTRAVENOUS at 14:03
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
## 2025-05-30 ENCOUNTER — HOSPITAL ENCOUNTER (OUTPATIENT)
Dept: MAMMOGRAPHY | Facility: HOSPITAL | Age: 75
Discharge: HOME | End: 2025-05-30
Admitting: INTERNAL MEDICINE
Payer: MEDICARE

## 2025-05-30 DIAGNOSIS — Z12.31 ENCOUNTER FOR SCREENING MAMMOGRAM FOR BREAST CANCER: ICD-10-CM

## 2025-05-30 PROCEDURE — 77067 SCR MAMMO BI INCL CAD: CPT

## 2025-05-30 PROCEDURE — 77063 BREAST TOMOSYNTHESIS BI: CPT

## 2025-06-04 ENCOUNTER — TELEPHONE (OUTPATIENT)
Dept: ONCOLOGY | Facility: HOSPITAL | Age: 75
End: 2025-06-04
Payer: MEDICARE

## 2025-06-04 NOTE — TELEPHONE ENCOUNTER
LEFT MESSAGE FOR PATIENT IN REGARDS TO LABS SCHEDULED FOR TODAY, I ASKED FOR PATIENT TO CALL OFFICE BACK TO GET RESCHEDULED.

## 2025-06-20 ENCOUNTER — LAB (OUTPATIENT)
Dept: ONCOLOGY | Facility: HOSPITAL | Age: 75
End: 2025-06-20
Payer: MEDICARE

## 2025-06-20 DIAGNOSIS — Z17.0 MALIGNANT NEOPLASM OF LEFT BREAST IN FEMALE, ESTROGEN RECEPTOR POSITIVE, UNSPECIFIED SITE OF BREAST: ICD-10-CM

## 2025-06-20 DIAGNOSIS — C50.912 MALIGNANT NEOPLASM OF LEFT BREAST IN FEMALE, ESTROGEN RECEPTOR POSITIVE, UNSPECIFIED SITE OF BREAST: ICD-10-CM

## 2025-06-20 LAB
ALBUMIN SERPL-MCNC: 4.2 G/DL (ref 3.5–5.2)
ALBUMIN/GLOB SERPL: 1.2 G/DL
ALP SERPL-CCNC: 139 U/L (ref 39–117)
ALT SERPL W P-5'-P-CCNC: 15 U/L (ref 1–33)
ANION GAP SERPL CALCULATED.3IONS-SCNC: 12 MMOL/L (ref 5–15)
AST SERPL-CCNC: 18 U/L (ref 1–32)
BASOPHILS # BLD AUTO: 0.03 10*3/MM3 (ref 0–0.2)
BASOPHILS NFR BLD AUTO: 0.3 % (ref 0–1.5)
BILIRUB SERPL-MCNC: 0.3 MG/DL (ref 0–1.2)
BUN SERPL-MCNC: 23.9 MG/DL (ref 8–23)
BUN/CREAT SERPL: 23 (ref 7–25)
CALCIUM SPEC-SCNC: 9 MG/DL (ref 8.6–10.5)
CHLORIDE SERPL-SCNC: 100 MMOL/L (ref 98–107)
CO2 SERPL-SCNC: 26 MMOL/L (ref 22–29)
CREAT SERPL-MCNC: 1.04 MG/DL (ref 0.57–1)
DEPRECATED RDW RBC AUTO: 51.6 FL (ref 37–54)
EGFRCR SERPLBLD CKD-EPI 2021: 56.9 ML/MIN/1.73
EOSINOPHIL # BLD AUTO: 0.07 10*3/MM3 (ref 0–0.4)
EOSINOPHIL NFR BLD AUTO: 0.7 % (ref 0.3–6.2)
ERYTHROCYTE [DISTWIDTH] IN BLOOD BY AUTOMATED COUNT: 15.6 % (ref 12.3–15.4)
GLOBULIN UR ELPH-MCNC: 3.5 GM/DL
GLUCOSE SERPL-MCNC: 137 MG/DL (ref 65–99)
HCT VFR BLD AUTO: 40.7 % (ref 34–46.6)
HGB BLD-MCNC: 13 G/DL (ref 12–15.9)
IMM GRANULOCYTES # BLD AUTO: 0.03 10*3/MM3 (ref 0–0.05)
IMM GRANULOCYTES NFR BLD AUTO: 0.3 % (ref 0–0.5)
LYMPHOCYTES # BLD AUTO: 1.94 10*3/MM3 (ref 0.7–3.1)
LYMPHOCYTES NFR BLD AUTO: 19.7 % (ref 19.6–45.3)
MCH RBC QN AUTO: 29 PG (ref 26.6–33)
MCHC RBC AUTO-ENTMCNC: 31.9 G/DL (ref 31.5–35.7)
MCV RBC AUTO: 90.6 FL (ref 79–97)
MONOCYTES # BLD AUTO: 0.77 10*3/MM3 (ref 0.1–0.9)
MONOCYTES NFR BLD AUTO: 7.8 % (ref 5–12)
NEUTROPHILS NFR BLD AUTO: 7 10*3/MM3 (ref 1.7–7)
NEUTROPHILS NFR BLD AUTO: 71.2 % (ref 42.7–76)
NRBC BLD AUTO-RTO: 0 /100 WBC (ref 0–0.2)
PLATELET # BLD AUTO: 283 10*3/MM3 (ref 140–450)
PMV BLD AUTO: 9.1 FL (ref 6–12)
POTASSIUM SERPL-SCNC: 4.3 MMOL/L (ref 3.5–5.2)
PROT SERPL-MCNC: 7.7 G/DL (ref 6–8.5)
RBC # BLD AUTO: 4.49 10*6/MM3 (ref 3.77–5.28)
SODIUM SERPL-SCNC: 138 MMOL/L (ref 136–145)
WBC NRBC COR # BLD AUTO: 9.84 10*3/MM3 (ref 3.4–10.8)

## 2025-06-20 PROCEDURE — 85025 COMPLETE CBC W/AUTO DIFF WBC: CPT

## 2025-06-20 PROCEDURE — 36415 COLL VENOUS BLD VENIPUNCTURE: CPT

## 2025-06-20 PROCEDURE — 80053 COMPREHEN METABOLIC PANEL: CPT

## 2025-06-27 ENCOUNTER — OFFICE VISIT (OUTPATIENT)
Dept: ONCOLOGY | Facility: HOSPITAL | Age: 75
End: 2025-06-27
Payer: MEDICARE

## 2025-06-27 VITALS
HEART RATE: 117 BPM | RESPIRATION RATE: 20 BRPM | SYSTOLIC BLOOD PRESSURE: 114 MMHG | DIASTOLIC BLOOD PRESSURE: 95 MMHG | OXYGEN SATURATION: 91 % | BODY MASS INDEX: 44.05 KG/M2 | HEIGHT: 62 IN | WEIGHT: 239.4 LBS | TEMPERATURE: 97.9 F

## 2025-06-27 DIAGNOSIS — Z17.0 MALIGNANT NEOPLASM OF LEFT BREAST IN FEMALE, ESTROGEN RECEPTOR POSITIVE, UNSPECIFIED SITE OF BREAST: ICD-10-CM

## 2025-06-27 DIAGNOSIS — C50.912 MALIGNANT NEOPLASM OF LEFT BREAST IN FEMALE, ESTROGEN RECEPTOR POSITIVE, UNSPECIFIED SITE OF BREAST: ICD-10-CM

## 2025-06-27 DIAGNOSIS — M81.0 AGE-RELATED OSTEOPOROSIS WITHOUT CURRENT PATHOLOGICAL FRACTURE: Primary | ICD-10-CM

## 2025-06-27 RX ORDER — ANASTROZOLE 1 MG/1
1 TABLET ORAL DAILY
Qty: 90 TABLET | Refills: 1 | Status: SHIPPED | OUTPATIENT
Start: 2025-06-27
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
## (undated) DEVICE — PENCL SMOKE/EVAC MEGADYNE TELESCP 10FT

## (undated) DEVICE — SUT PERMAHAND SILK 0 FSL 30IN BLK

## (undated) DEVICE — ADHS LIQ MASTISOL 2/3ML

## (undated) DEVICE — DEV OPN LIGASURE DISSCT EXACT 40DEG 21.6MM

## (undated) DEVICE — STERILE POLYISOPRENE POWDER-FREE SURGICAL GLOVES WITH EMOLLIENT COATING: Brand: PROTEXIS

## (undated) DEVICE — GOWN,REINFORCE,POLY,SIRUS,BREATH SLV,XLG: Brand: MEDLINE

## (undated) DEVICE — CVR PROB 96IN LF STRL

## (undated) DEVICE — GLV SURG SENSICARE PI ORTHO SZ8 LF STRL

## (undated) DEVICE — DRSNG SURG AQUACEL AG/ADVNTGE 9X15CM 3.5X6IN

## (undated) DEVICE — ANTIBACTERIAL UNDYED BRAIDED (POLYGLACTIN 910), SYNTHETIC ABSORBABLE SUTURE: Brand: COATED VICRYL

## (undated) DEVICE — INTENDED FOR TISSUE SEPARATION, AND OTHER PROCEDURES THAT REQUIRE A SHARP SURGICAL BLADE TO PUNCTURE OR CUT.: Brand: BARD-PARKER ® CARBON RIB-BACK BLADES

## (undated) DEVICE — CONTAINER,SPECIMEN,O.R.STRL,4.5OZ: Brand: MEDLINE

## (undated) DEVICE — SLV SCD KN/LEN ADJ EXPRSS BLENDED MD 1P/U

## (undated) DEVICE — STRIP CLS WND SUTURESTRIP/PLS 0.5X4IN TP1103

## (undated) DEVICE — BRA COMPR SURG STL119 V/CLOSE/FRNT SZSQ WHT

## (undated) DEVICE — MAJOR-LF: Brand: MEDLINE INDUSTRIES, INC.

## (undated) DEVICE — SUT VIC 3/0 SH 27IN J416H

## (undated) DEVICE — GOWN,REINFRCE,POLY,SIRUS,BREATH SLV,XXLG: Brand: MEDLINE